# Patient Record
Sex: FEMALE | Race: WHITE | NOT HISPANIC OR LATINO | Employment: OTHER | URBAN - METROPOLITAN AREA
[De-identification: names, ages, dates, MRNs, and addresses within clinical notes are randomized per-mention and may not be internally consistent; named-entity substitution may affect disease eponyms.]

---

## 2017-04-26 ENCOUNTER — TELEPHONE (OUTPATIENT)
Dept: NEUROSURGERY | Facility: CLINIC | Age: 77
End: 2017-04-26

## 2017-04-26 NOTE — TELEPHONE ENCOUNTER
"History of  shunt. Last appt in 2013 with shunt-o-gram 3-4-13/no problems found. Patient rambling on (difficult to get her story out, long-winded) - states that her [facility] doc told her to get a disc and send it to us as her \"head was filling up\".  I explained to patient that we need to have these records faxed or mailed to us along with a disc that has any new imaging so we can help figure out what is going on and whether she needs to be seen here. She doesn't report \"any problems\".     After repeating myself multiple times, she will do this.  "

## 2017-11-09 ENCOUNTER — CONVERSION ENCOUNTER (OUTPATIENT)
Dept: GENERAL RADIOLOGY | Facility: HOSPITAL | Age: 77
End: 2017-11-09

## 2018-01-18 ENCOUNTER — OFFICE VISIT CONVERTED (OUTPATIENT)
Dept: CARDIOLOGY | Facility: CLINIC | Age: 78
End: 2018-01-18
Attending: INTERNAL MEDICINE

## 2018-01-29 ENCOUNTER — OFFICE VISIT CONVERTED (OUTPATIENT)
Dept: FAMILY MEDICINE CLINIC | Facility: CLINIC | Age: 78
End: 2018-01-29
Attending: PHYSICIAN ASSISTANT

## 2018-01-30 ENCOUNTER — OFFICE VISIT CONVERTED (OUTPATIENT)
Dept: NEUROLOGY | Facility: CLINIC | Age: 78
End: 2018-01-30
Attending: PSYCHIATRY & NEUROLOGY

## 2018-04-11 ENCOUNTER — CONVERSION ENCOUNTER (OUTPATIENT)
Dept: ONCOLOGY | Facility: HOSPITAL | Age: 78
End: 2018-04-11

## 2018-04-19 ENCOUNTER — CONVERSION ENCOUNTER (OUTPATIENT)
Dept: CARDIOLOGY | Facility: CLINIC | Age: 78
End: 2018-04-19

## 2018-04-19 ENCOUNTER — CONVERSION ENCOUNTER (OUTPATIENT)
Dept: CARDIOLOGY | Facility: CLINIC | Age: 78
End: 2018-04-19
Attending: INTERNAL MEDICINE

## 2018-06-06 ENCOUNTER — OFFICE VISIT CONVERTED (OUTPATIENT)
Dept: FAMILY MEDICINE CLINIC | Facility: CLINIC | Age: 78
End: 2018-06-06
Attending: PHYSICIAN ASSISTANT

## 2018-06-15 ENCOUNTER — OFFICE VISIT CONVERTED (OUTPATIENT)
Dept: NEUROLOGY | Facility: CLINIC | Age: 78
End: 2018-06-15
Attending: PSYCHIATRY & NEUROLOGY

## 2018-06-18 ENCOUNTER — CONVERSION ENCOUNTER (OUTPATIENT)
Dept: SURGERY | Facility: CLINIC | Age: 78
End: 2018-06-18

## 2018-06-18 ENCOUNTER — OFFICE VISIT CONVERTED (OUTPATIENT)
Dept: SURGERY | Facility: CLINIC | Age: 78
End: 2018-06-18
Attending: PHYSICIAN ASSISTANT

## 2018-07-06 ENCOUNTER — OFFICE VISIT CONVERTED (OUTPATIENT)
Dept: SURGERY | Facility: CLINIC | Age: 78
End: 2018-07-06
Attending: PHYSICIAN ASSISTANT

## 2018-08-06 ENCOUNTER — OFFICE VISIT CONVERTED (OUTPATIENT)
Dept: SURGERY | Facility: CLINIC | Age: 78
End: 2018-08-06
Attending: PHYSICIAN ASSISTANT

## 2018-10-15 ENCOUNTER — OFFICE VISIT CONVERTED (OUTPATIENT)
Dept: FAMILY MEDICINE CLINIC | Facility: CLINIC | Age: 78
End: 2018-10-15
Attending: PHYSICIAN ASSISTANT

## 2018-11-14 ENCOUNTER — OFFICE VISIT CONVERTED (OUTPATIENT)
Dept: ONCOLOGY | Facility: HOSPITAL | Age: 78
End: 2018-11-14
Attending: NURSE PRACTITIONER

## 2018-11-24 ENCOUNTER — CONVERSION ENCOUNTER (OUTPATIENT)
Dept: GENERAL RADIOLOGY | Facility: HOSPITAL | Age: 78
End: 2018-11-24

## 2019-01-10 ENCOUNTER — OFFICE VISIT CONVERTED (OUTPATIENT)
Dept: NEUROLOGY | Facility: CLINIC | Age: 79
End: 2019-01-10
Attending: PSYCHIATRY & NEUROLOGY

## 2019-01-24 ENCOUNTER — HOSPITAL ENCOUNTER (OUTPATIENT)
Dept: LAB | Facility: HOSPITAL | Age: 79
Discharge: HOME | End: 2019-01-24

## 2019-01-24 LAB
ALBUMIN SERPL-MCNC: 3.8 G/DL (ref 3.5–5)
ALBUMIN/GLOB SERPL: 0.9 {RATIO} (ref 1.4–2.6)
ALP SERPL-CCNC: 79 U/L (ref 43–160)
ALT SERPL-CCNC: 15 U/L (ref 10–40)
ANION GAP SERPL CALC-SCNC: 15 MMOL/L (ref 8–19)
AST SERPL-CCNC: 14 U/L (ref 15–50)
BILIRUB SERPL-MCNC: 0.33 MG/DL (ref 0.2–1.3)
BUN SERPL-MCNC: 10 MG/DL (ref 5–25)
BUN/CREAT SERPL: 9 {RATIO} (ref 6–20)
CALCIUM SERPL-MCNC: 9.5 MG/DL (ref 8.7–10.4)
CHLORIDE SERPL-SCNC: 96 MMOL/L (ref 99–111)
CONV CO2: 30 MMOL/L (ref 22–32)
CONV TOTAL PROTEIN: 8.2 G/DL (ref 6.3–8.2)
CREAT UR-MCNC: 1.12 MG/DL (ref 0.5–0.9)
GFR SERPLBLD BASED ON 1.73 SQ M-ARVRAT: 48 ML/MIN/{1.73_M2}
GLOBULIN UR ELPH-MCNC: 4.4 G/DL (ref 2–3.5)
GLUCOSE SERPL-MCNC: 107 MG/DL (ref 65–99)
OSMOLALITY SERPL CALC.SUM OF ELEC: 284 MOSM/KG (ref 273–304)
POTASSIUM SERPL-SCNC: 3.7 MMOL/L (ref 3.5–5.3)
SODIUM SERPL-SCNC: 137 MMOL/L (ref 135–147)

## 2019-01-25 ENCOUNTER — OFFICE VISIT CONVERTED (OUTPATIENT)
Dept: ONCOLOGY | Facility: HOSPITAL | Age: 79
End: 2019-01-25
Attending: NURSE PRACTITIONER

## 2019-01-25 ENCOUNTER — HOSPITAL ENCOUNTER (OUTPATIENT)
Dept: ONCOLOGY | Facility: HOSPITAL | Age: 79
Discharge: HOME | End: 2019-01-25
Attending: NURSE PRACTITIONER

## 2019-01-25 LAB
ALBUMIN SERPL-MCNC: 3.8 G/DL (ref 3.5–5)
ALBUMIN/GLOB SERPL: 0.9 {RATIO} (ref 1.4–2.6)
ALP SERPL-CCNC: 79 U/L (ref 43–160)
ALT SERPL-CCNC: 14 U/L (ref 10–40)
ANION GAP SERPL CALC-SCNC: 14 MMOL/L (ref 8–19)
AST SERPL-CCNC: 13 U/L (ref 15–50)
BASOPHILS # BLD AUTO: 0.06 10*3/UL (ref 0–0.2)
BASOPHILS # BLD AUTO: 0.06 10*3/UL (ref 0–0.2)
BASOPHILS # BLD: 2 % (ref 0–3)
BASOPHILS NFR BLD AUTO: 0.5 % (ref 0–3)
BASOPHILS NFR BLD AUTO: 0.54 % (ref 0–3)
BILIRUB SERPL-MCNC: 0.3 MG/DL (ref 0.2–1.3)
BUN SERPL-MCNC: 11 MG/DL (ref 5–25)
BUN/CREAT SERPL: 11 {RATIO} (ref 6–20)
CALCIUM SERPL-MCNC: 9.3 MG/DL (ref 8.7–10.4)
CHLORIDE SERPL-SCNC: 97 MMOL/L (ref 99–111)
CLUMPED PLATELETS: ABNORMAL
CONV ABS BANDS: 1 % (ref 1–5)
CONV ANISOCYTES: SLIGHT
CONV CO2: 28 MMOL/L (ref 22–32)
CONV SEGMENTED NEUTROPHILS: 69 % (ref 45–70)
CONV TOTAL PROTEIN: 8.1 G/DL (ref 6.3–8.2)
CREAT UR-MCNC: 0.96 MG/DL (ref 0.5–0.9)
EOSINOPHIL # BLD AUTO: 0.28 10*3/UL (ref 0–0.7)
EOSINOPHIL # BLD AUTO: 0.32 10*3/UL (ref 0–0.7)
EOSINOPHIL # BLD AUTO: 2.5 % (ref 0–7)
EOSINOPHIL # BLD AUTO: 2.66 % (ref 0–7)
EOSINOPHIL NFR BLD AUTO: 2 % (ref 0–7)
ERYTHROCYTE [DISTWIDTH] IN BLOOD BY AUTOMATED COUNT: 13.6 % (ref 11.5–14.5)
ERYTHROCYTE [DISTWIDTH] IN BLOOD BY AUTOMATED COUNT: 14.5 % (ref 11.5–14.5)
GFR SERPLBLD BASED ON 1.73 SQ M-ARVRAT: 57 ML/MIN/{1.73_M2}
GLOBULIN UR ELPH-MCNC: 4.3 G/DL (ref 2–3.5)
GLUCOSE SERPL-MCNC: 108 MG/DL (ref 65–99)
HBA1C MFR BLD: 12.3 G/DL (ref 12–16)
HBA1C MFR BLD: 13.2 G/DL (ref 12–16)
HCT VFR BLD AUTO: 37 % (ref 37–47)
HCT VFR BLD AUTO: 38.4 % (ref 37–47)
LARGE PLATELETS: SLIGHT
LYMPHOCYTES # BLD AUTO: 2.4 10*3/UL (ref 1–5)
LYMPHOCYTES # BLD AUTO: 2.94 10*3/UL (ref 1–5)
MACROCYTES BLD QL SMEAR: SLIGHT
MCH RBC QN AUTO: 32.7 PG (ref 27–31)
MCH RBC QN AUTO: 33.7 PG (ref 27–31)
MCHC RBC AUTO-ENTMCNC: 33.2 G/DL (ref 33–37)
MCHC RBC AUTO-ENTMCNC: 34.4 G/DL (ref 33–37)
MCV RBC AUTO: 98.1 FL (ref 81–99)
MCV RBC AUTO: 98.4 FL (ref 81–99)
MONOCYTES # BLD AUTO: 0.82 10*3/UL (ref 0.2–1.2)
MONOCYTES # BLD AUTO: 0.83 10*3/UL (ref 0.2–1.2)
MONOCYTES NFR BLD AUTO: 6.99 % (ref 3–10)
MONOCYTES NFR BLD AUTO: 7.3 % (ref 3–10)
MONOCYTES NFR BLD MANUAL: 5 % (ref 3–10)
NEUTROPHILS # BLD AUTO: 7.65 10*3/UL (ref 2–8)
NEUTROPHILS # BLD AUTO: 7.77 10*3/UL (ref 2–8)
NEUTROPHILS NFR BLD AUTO: 65.2 % (ref 30–85)
NEUTROPHILS NFR BLD AUTO: 67.8 % (ref 30–85)
NRBC BLD AUTO-RTO: 0 % (ref 0–0.01)
NUC CELL # PRT MANUAL: 0 /100{WBCS}
OSMOLALITY SERPL CALC.SUM OF ELEC: 280 MOSM/KG (ref 273–304)
PLAT MORPH BLD: NORMAL
PLATELET # BLD AUTO: 214 10*3/UL (ref 130–400)
PLATELET # BLD AUTO: 218 10*3/UL (ref 130–400)
PMV BLD AUTO: 12.5 FL (ref 7.4–10.4)
PMV BLD AUTO: 9.6 FL (ref 7.4–10.4)
POIKILOCYTOSIS BLD QL SMEAR: SLIGHT
POLYCHROMASIA BLD QL SMEAR: SLIGHT
POTASSIUM SERPL-SCNC: 3.9 MMOL/L (ref 3.5–5.3)
RBC # BLD AUTO: 3.76 10*6/UL (ref 4.2–5.4)
RBC # BLD AUTO: 3.92 10*6/UL (ref 4.2–5.4)
SMALL PLATELETS BLD QL SMEAR: ADEQUATE
SODIUM SERPL-SCNC: 135 MMOL/L (ref 135–147)
VARIANT LYMPHS NFR BLD MANUAL: 21 % (ref 20–45)
VARIANT LYMPHS NFR BLD MANUAL: 21.3 % (ref 20–45)
VARIANT LYMPHS NFR BLD MANUAL: 24.7 % (ref 20–45)
WBC # BLD AUTO: 11.28 10*3/UL (ref 4.8–10.8)
WBC # BLD AUTO: 11.9 10*3/UL (ref 4.8–10.8)

## 2019-01-26 LAB
FREE LIGHT CHAINS: 77.7 MG/L (ref 3.3–19.4)
KAPPA LC/LAMBDA SER: 2.03 {RATIO} (ref 0.26–1.65)
LAMBDA LC FREE SERPL-MCNC: 38.2 MG/L (ref 5.7–26.3)

## 2019-01-27 LAB — B2 MICROGLOB SERPL-MCNC: 2.6 MG/L (ref 0.6–2.4)

## 2019-01-28 LAB
ALBUMIN SERPL-MCNC: 2.9 G/DL (ref 2.9–4.4)
ALBUMIN/GLOB SERPL: 0.6 {RATIO} (ref 0.7–1.7)
ALPHA2 GLOB SERPL ELPH-MCNC: 0.9 G/DL (ref 0.4–1)
BETA GLOBULIN: 1.2 G/DL (ref 0.7–1.3)
CONV ALPHA-1-GLOBULIN: 0.2 G/DL (ref 0–0.4)
CONV IMMUNOGLOBULIN G (IGG): 2002 MG/DL (ref 700–1600)
CONV IMMUNOGLOBULIN M (IGM): 127 MG/DL (ref 26–217)
CONV PE INTERPRETATION: ABNORMAL
CONV PE NOTE: ABNORMAL
CONV TOTAL PROTEIN: 7.5 G/DL (ref 6–8.5)
GAMMA GLOB SERPL ELPH-MCNC: 2.3 G/DL (ref 0.4–1.8)
GLOBULIN UR ELPH-MCNC: 4.6 G/DL (ref 2.2–3.9)
IGA SERPL-MCNC: 510 MG/DL (ref 64–422)
M-SPIKE: 0.6 G/DL
PROT PATTERN SERPL IFE-IMP: ABNORMAL

## 2019-01-29 ENCOUNTER — HOSPITAL ENCOUNTER (OUTPATIENT)
Dept: CT IMAGING | Facility: HOSPITAL | Age: 79
Discharge: HOME | End: 2019-01-29
Attending: NURSE PRACTITIONER

## 2019-01-29 LAB
BASOPHILS # BLD AUTO: 0.02 10*3/UL (ref 0–0.2)
BASOPHILS # BLD: 0 % (ref 0–3)
BASOPHILS NFR BLD AUTO: 0.22 % (ref 0–3)
CLUMPED PLATELETS: SLIGHT
CONV ABS BANDS: 0 % (ref 1–5)
CONV ANISOCYTES: SLIGHT
CONV SEGMENTED NEUTROPHILS: 63 % (ref 45–70)
EOSINOPHIL # BLD AUTO: 0.21 10*3/UL (ref 0–0.7)
EOSINOPHIL # BLD AUTO: 1.97 % (ref 0–7)
EOSINOPHIL NFR BLD AUTO: 0 % (ref 0–7)
ERYTHROCYTE [DISTWIDTH] IN BLOOD BY AUTOMATED COUNT: 13.4 % (ref 11.5–14.5)
HBA1C MFR BLD: 13.2 G/DL (ref 12–16)
HCT VFR BLD AUTO: 36.9 % (ref 37–47)
LARGE PLATELETS: SLIGHT
LYMPHOCYTES # BLD AUTO: 2.47 10*3/UL (ref 1–5)
MACROCYTES BLD QL SMEAR: ABNORMAL
MCH RBC QN AUTO: 34.3 PG (ref 27–31)
MCHC RBC AUTO-ENTMCNC: 35.8 G/DL (ref 33–37)
MCV RBC AUTO: 95.8 FL (ref 81–99)
MONOCYTES # BLD AUTO: 0.55 10*3/UL (ref 0.2–1.2)
MONOCYTES NFR BLD AUTO: 5.23 % (ref 3–10)
MONOCYTES NFR BLD MANUAL: 1 % (ref 3–10)
NEUTROPHILS # BLD AUTO: 7.35 10*3/UL (ref 2–8)
NEUTROPHILS NFR BLD AUTO: 69.3 % (ref 30–85)
NRBC BLD AUTO-RTO: 0 % (ref 0–0.01)
NUC CELL # PRT MANUAL: 0 /100{WBCS}
PLAT MORPH BLD: NORMAL
PLATELET # BLD AUTO: 230 10*3/UL (ref 130–400)
PMV BLD AUTO: 8.2 FL (ref 7.4–10.4)
RBC # BLD AUTO: 3.85 10*6/UL (ref 4.2–5.4)
SMALL PLATELETS BLD QL SMEAR: ADEQUATE
VARIANT LYMPHS NFR BLD MANUAL: 23.3 % (ref 20–45)
VARIANT LYMPHS NFR BLD MANUAL: 36 % (ref 20–45)
WBC # BLD AUTO: 10.6 10*3/UL (ref 4.8–10.8)

## 2019-01-31 ENCOUNTER — HOSPITAL ENCOUNTER (OUTPATIENT)
Dept: PET IMAGING | Facility: HOSPITAL | Age: 79
Discharge: HOME | End: 2019-01-31
Attending: NURSE PRACTITIONER

## 2019-01-31 LAB — CONV LEUKEMIA/LYMPHOMA PHENOTYPING PROF (BONE MARROW): NORMAL

## 2019-02-04 LAB
ALBUMIN 24H MFR UR ELPH: 21.9 %
ALPHA1 GLOB 24H MFR UR ELPH: 11.5 %
ALPHA2 GLOB 24H MFR UR ELPH: 14.4 %
B-GLOBULIN MFR UR ELPH: 33 %
CONV IMMUNOFIXATION (URINE): NORMAL
CONV MULTIPLE MYELOMA CHROMOSOME ANALYSIS FISH: NORMAL
CONV PE NOTE: NORMAL
GAMMA GLOB 24H MFR UR ELPH: 19.2 %
M PROTEIN MFR UR ELPH: NORMAL %
PROT UR-MCNC: 7.9 MG/DL

## 2019-02-07 LAB — CONV CHROMOSOME ANALYSIS BONE MARROW/REFLX GENOMIC MICROARRAY: NORMAL

## 2019-02-11 LAB — CONV CYTOGENOMIC SNP MICROARRAY: NORMAL

## 2019-03-08 ENCOUNTER — OFFICE VISIT CONVERTED (OUTPATIENT)
Dept: ONCOLOGY | Facility: HOSPITAL | Age: 79
End: 2019-03-08

## 2019-03-08 ENCOUNTER — HOSPITAL ENCOUNTER (OUTPATIENT)
Dept: ONCOLOGY | Facility: HOSPITAL | Age: 79
Discharge: HOME | End: 2019-03-08

## 2019-04-10 ENCOUNTER — CONVERSION ENCOUNTER (OUTPATIENT)
Dept: CARDIOLOGY | Facility: CLINIC | Age: 79
End: 2019-04-10

## 2019-04-10 ENCOUNTER — OFFICE VISIT CONVERTED (OUTPATIENT)
Dept: CARDIOLOGY | Facility: CLINIC | Age: 79
End: 2019-04-10
Attending: INTERNAL MEDICINE

## 2019-04-15 ENCOUNTER — HOSPITAL ENCOUNTER (OUTPATIENT)
Dept: LAB | Facility: HOSPITAL | Age: 79
Discharge: HOME | End: 2019-04-15
Attending: PHYSICIAN ASSISTANT

## 2019-04-15 ENCOUNTER — OFFICE VISIT CONVERTED (OUTPATIENT)
Dept: FAMILY MEDICINE CLINIC | Facility: CLINIC | Age: 79
End: 2019-04-15
Attending: PHYSICIAN ASSISTANT

## 2019-04-15 LAB
25(OH)D3 SERPL-MCNC: 50.7 NG/ML (ref 30–100)
ALBUMIN SERPL-MCNC: 3.5 G/DL (ref 3.5–5)
ALBUMIN/GLOB SERPL: 0.7 {RATIO} (ref 1.4–2.6)
ALP SERPL-CCNC: 91 U/L (ref 43–160)
ALT SERPL-CCNC: 12 U/L (ref 10–40)
ANION GAP SERPL CALC-SCNC: 16 MMOL/L (ref 8–19)
APPEARANCE UR: CLEAR
AST SERPL-CCNC: 11 U/L (ref 15–50)
BASOPHILS # BLD AUTO: 0.05 10*3/UL (ref 0–0.2)
BASOPHILS NFR BLD AUTO: 0.4 % (ref 0–3)
BILIRUB SERPL-MCNC: 0.38 MG/DL (ref 0.2–1.3)
BILIRUB UR QL: NEGATIVE
BUN SERPL-MCNC: 10 MG/DL (ref 5–25)
BUN/CREAT SERPL: 10 {RATIO} (ref 6–20)
CALCIUM SERPL-MCNC: 9.3 MG/DL (ref 8.7–10.4)
CHLORIDE SERPL-SCNC: 96 MMOL/L (ref 99–111)
CHOLEST SERPL-MCNC: 154 MG/DL (ref 107–200)
CHOLEST/HDLC SERPL: 3.9 {RATIO} (ref 3–6)
COLOR UR: YELLOW
CONV ABS IMM GRAN: 0.08 10*3/UL (ref 0–0.2)
CONV CO2: 29 MMOL/L (ref 22–32)
CONV COLLECTION SOURCE (UA): NORMAL
CONV IMMATURE GRAN: 0.7 % (ref 0–1.8)
CONV TOTAL PROTEIN: 8.4 G/DL (ref 6.3–8.2)
CONV UROBILINOGEN IN URINE BY AUTOMATED TEST STRIP: 0.2 {EHRLICHU}/DL (ref 0.1–1)
CREAT UR-MCNC: 1.01 MG/DL (ref 0.5–0.9)
DEPRECATED RDW RBC AUTO: 50 FL (ref 36.4–46.3)
EOSINOPHIL # BLD AUTO: 0.29 10*3/UL (ref 0–0.7)
EOSINOPHIL # BLD AUTO: 2.6 % (ref 0–7)
ERYTHROCYTE [DISTWIDTH] IN BLOOD BY AUTOMATED COUNT: 13.3 % (ref 11.7–14.4)
EST. AVERAGE GLUCOSE BLD GHB EST-MCNC: 123 MG/DL
FERRITIN SERPL-MCNC: 720 NG/ML (ref 10–200)
GFR SERPLBLD BASED ON 1.73 SQ M-ARVRAT: 54 ML/MIN/{1.73_M2}
GLOBULIN UR ELPH-MCNC: 4.9 G/DL (ref 2–3.5)
GLUCOSE SERPL-MCNC: 113 MG/DL (ref 65–99)
GLUCOSE UR QL: NEGATIVE MG/DL
HBA1C MFR BLD: 12.6 G/DL (ref 12–16)
HBA1C MFR BLD: 5.9 % (ref 3.5–5.7)
HCT VFR BLD AUTO: 37.9 % (ref 37–47)
HDLC SERPL-MCNC: 39 MG/DL (ref 40–60)
HGB UR QL STRIP: NEGATIVE
IRON SATN MFR SERPL: 21 % (ref 20–55)
IRON SERPL-MCNC: 58 UG/DL (ref 60–170)
KETONES UR QL STRIP: NEGATIVE MG/DL
LDLC SERPL CALC-MCNC: 72 MG/DL (ref 70–100)
LEUKOCYTE ESTERASE UR QL STRIP: NEGATIVE
LYMPHOCYTES # BLD AUTO: 2.56 10*3/UL (ref 1–5)
MCH RBC QN AUTO: 33.6 PG (ref 27–31)
MCHC RBC AUTO-ENTMCNC: 33.2 G/DL (ref 33–37)
MCV RBC AUTO: 101.1 FL (ref 81–99)
MONOCYTES # BLD AUTO: 0.76 10*3/UL (ref 0.2–1.2)
MONOCYTES NFR BLD AUTO: 6.7 % (ref 3–10)
NEUTROPHILS # BLD AUTO: 7.58 10*3/UL (ref 2–8)
NEUTROPHILS NFR BLD AUTO: 67 % (ref 30–85)
NITRITE UR QL STRIP: NEGATIVE
NRBC CBCN: 0 % (ref 0–0.7)
OSMOLALITY SERPL CALC.SUM OF ELEC: 284 MOSM/KG (ref 273–304)
PH UR STRIP.AUTO: 7 [PH] (ref 5–8)
PLATELET # BLD AUTO: 180 10*3/UL (ref 130–400)
PMV BLD AUTO: 12.9 FL (ref 9.4–12.3)
POTASSIUM SERPL-SCNC: 3.7 MMOL/L (ref 3.5–5.3)
PROT UR QL: NEGATIVE MG/DL
RBC # BLD AUTO: 3.75 10*6/UL (ref 4.2–5.4)
SODIUM SERPL-SCNC: 137 MMOL/L (ref 135–147)
SP GR UR: 1.02 (ref 1–1.03)
T4 FREE SERPL-MCNC: 1.1 NG/DL (ref 0.9–1.8)
TIBC SERPL-MCNC: 277 UG/DL (ref 245–450)
TRANSFERRIN SERPL-MCNC: 194 MG/DL (ref 250–380)
TRIGL SERPL-MCNC: 213 MG/DL (ref 40–150)
TSH SERPL-ACNC: 16.52 M[IU]/L (ref 0.27–4.2)
VARIANT LYMPHS NFR BLD MANUAL: 22.6 % (ref 20–45)
VLDLC SERPL-MCNC: 43 MG/DL (ref 5–37)
WBC # BLD AUTO: 11.32 10*3/UL (ref 4.8–10.8)

## 2019-04-26 ENCOUNTER — OFFICE VISIT CONVERTED (OUTPATIENT)
Dept: FAMILY MEDICINE CLINIC | Facility: CLINIC | Age: 79
End: 2019-04-26
Attending: PHYSICIAN ASSISTANT

## 2021-04-13 ENCOUNTER — HOSPITAL ENCOUNTER (OUTPATIENT)
Dept: GENERAL RADIOLOGY | Facility: HOSPITAL | Age: 81
Discharge: HOME | End: 2021-04-13
Attending: INTERNAL MEDICINE

## 2021-05-15 VITALS
DIASTOLIC BLOOD PRESSURE: 71 MMHG | OXYGEN SATURATION: 94 % | HEART RATE: 77 BPM | SYSTOLIC BLOOD PRESSURE: 139 MMHG | WEIGHT: 222.37 LBS

## 2021-05-15 VITALS
HEIGHT: 64 IN | WEIGHT: 225.12 LBS | OXYGEN SATURATION: 97 % | BODY MASS INDEX: 38.43 KG/M2 | SYSTOLIC BLOOD PRESSURE: 114 MMHG | HEART RATE: 64 BPM | DIASTOLIC BLOOD PRESSURE: 62 MMHG

## 2021-05-15 VITALS
HEIGHT: 64 IN | DIASTOLIC BLOOD PRESSURE: 80 MMHG | SYSTOLIC BLOOD PRESSURE: 118 MMHG | BODY MASS INDEX: 37.9 KG/M2 | WEIGHT: 222 LBS | HEART RATE: 65 BPM

## 2021-05-16 VITALS
DIASTOLIC BLOOD PRESSURE: 68 MMHG | HEART RATE: 83 BPM | HEIGHT: 64 IN | SYSTOLIC BLOOD PRESSURE: 174 MMHG | WEIGHT: 208.25 LBS | BODY MASS INDEX: 35.55 KG/M2

## 2021-05-16 VITALS
HEIGHT: 64 IN | SYSTOLIC BLOOD PRESSURE: 138 MMHG | HEART RATE: 76 BPM | DIASTOLIC BLOOD PRESSURE: 56 MMHG | BODY MASS INDEX: 36.79 KG/M2 | WEIGHT: 215.5 LBS

## 2021-05-16 VITALS
HEIGHT: 64 IN | HEART RATE: 83 BPM | SYSTOLIC BLOOD PRESSURE: 134 MMHG | WEIGHT: 216 LBS | DIASTOLIC BLOOD PRESSURE: 78 MMHG | BODY MASS INDEX: 36.88 KG/M2

## 2021-05-16 VITALS — WEIGHT: 209.12 LBS | HEIGHT: 64 IN | BODY MASS INDEX: 35.7 KG/M2 | RESPIRATION RATE: 12 BRPM

## 2021-05-16 VITALS
WEIGHT: 219 LBS | BODY MASS INDEX: 40.3 KG/M2 | HEART RATE: 72 BPM | HEIGHT: 62 IN | DIASTOLIC BLOOD PRESSURE: 53 MMHG | SYSTOLIC BLOOD PRESSURE: 146 MMHG

## 2021-05-16 VITALS — HEART RATE: 97 BPM | RESPIRATION RATE: 14 BRPM | OXYGEN SATURATION: 95 %

## 2021-05-16 VITALS — HEIGHT: 64 IN | BODY MASS INDEX: 35.68 KG/M2 | RESPIRATION RATE: 12 BRPM | WEIGHT: 209 LBS

## 2021-05-16 VITALS
HEART RATE: 96 BPM | BODY MASS INDEX: 37.22 KG/M2 | DIASTOLIC BLOOD PRESSURE: 75 MMHG | WEIGHT: 218 LBS | SYSTOLIC BLOOD PRESSURE: 151 MMHG | HEIGHT: 64 IN

## 2021-05-16 VITALS
WEIGHT: 218 LBS | SYSTOLIC BLOOD PRESSURE: 138 MMHG | HEART RATE: 64 BPM | BODY MASS INDEX: 37.22 KG/M2 | HEIGHT: 64 IN | DIASTOLIC BLOOD PRESSURE: 72 MMHG

## 2021-05-16 VITALS
SYSTOLIC BLOOD PRESSURE: 160 MMHG | HEIGHT: 64 IN | HEART RATE: 68 BPM | DIASTOLIC BLOOD PRESSURE: 74 MMHG | BODY MASS INDEX: 35.85 KG/M2 | WEIGHT: 210 LBS

## 2021-05-16 VITALS
WEIGHT: 207 LBS | HEIGHT: 64 IN | DIASTOLIC BLOOD PRESSURE: 44 MMHG | SYSTOLIC BLOOD PRESSURE: 128 MMHG | BODY MASS INDEX: 35.34 KG/M2 | HEART RATE: 76 BPM

## 2021-05-28 VITALS
WEIGHT: 220.46 LBS | RESPIRATION RATE: 20 BRPM | HEART RATE: 65 BPM | DIASTOLIC BLOOD PRESSURE: 71 MMHG | TEMPERATURE: 96.8 F | DIASTOLIC BLOOD PRESSURE: 62 MMHG | OXYGEN SATURATION: 97 % | TEMPERATURE: 96.7 F | SYSTOLIC BLOOD PRESSURE: 144 MMHG | BODY MASS INDEX: 38.73 KG/M2 | BODY MASS INDEX: 37.64 KG/M2 | HEIGHT: 64 IN | OXYGEN SATURATION: 97 % | HEART RATE: 82 BPM | HEIGHT: 64 IN | WEIGHT: 226.85 LBS | SYSTOLIC BLOOD PRESSURE: 139 MMHG

## 2021-05-28 VITALS
RESPIRATION RATE: 18 BRPM | HEIGHT: 64 IN | OXYGEN SATURATION: 96 % | WEIGHT: 223.99 LBS | SYSTOLIC BLOOD PRESSURE: 129 MMHG | TEMPERATURE: 97 F | HEART RATE: 73 BPM | BODY MASS INDEX: 38.24 KG/M2 | DIASTOLIC BLOOD PRESSURE: 46 MMHG

## 2021-05-28 VITALS
SYSTOLIC BLOOD PRESSURE: 133 MMHG | DIASTOLIC BLOOD PRESSURE: 55 MMHG | HEART RATE: 75 BPM | TEMPERATURE: 97.8 F | RESPIRATION RATE: 20 BRPM

## 2021-05-28 NOTE — PROGRESS NOTES
Patient: GEORGETTE DAILY     Acct: [id number]     Report: #[id number]  UNIT #: [id number]     : 1940    Encounter Date:2019  PRIMARY CARE: SHORTY KEMP  ***Signed***  --------------------------------------------------------------------------------------------------------------------  NURSE INTAKE      Visit Type      Established Patient Visit            Chief Complaint      plasma cell dyscrasia            Referring Provider/Copies To      Referring Provider:  SHORTY KEMP            History and Present Illness      Past History      Ms. Georgette Daily is a 76 year old Caucausian female who presents today with her    daughter for evaluation for abnormal immunoglobulins. The patient is hard of     hearing and forgot her hearing aids today. Her daughter presents her history     today. Patient has a long history of tobacco abuse dating back to 50 years of 1     PPD. The daughter reports patient has had recent history of multiple falls and 2    days ago, fell asleep at her computer, falling out of the chair and woke up on     the floor. Patient recently completed a CT scan of the head at the [facility]     which the results of this are not available to me at this time. There is a     reported history of multiple blackouts and daughter states patient has an     implantable Holter monitor in to record the heart data. She sees Dr. Nieves as her    cardiologist.  Her PCP is Dr.Randall Kemp. Patient has been seeing Dr. Avila     for her breathing problems and was recently found to have a single pulmonary     nodule on CT.  Recently on antibotics for a lung infection.  She follows up with    Dr. Avila in May. Medical history includes: hard of hearing, black outs,     frequent falls,  hypercholesteremia, hypertension, hypothyroidism,  Meniere's     disease.  Surgeries include:  brain anurysm surgery x 5 dating back to ,     brain shunts x 2 surgeries, gallbladder removal, facial melanoma surgery, and      hystorectomy. Medications include: Acebutolol, Vitamin D 3, Flaxseed Oil, HCTZ,     Potassium tablets, Levothyroxine, and a MVI. The patient has been using a cane     for assistance for the last 12 years. ROS: reveals shorrtness of air, wheezing,     productive sounding cough, hard of hearing, obesity, and bilateral lower     extremity swelling. The daughter reports patient was given Lasix for 3 days by     Dr. Nieves for the lower extremity swelling which has not helped with the     swelling. Family history includes a mother with bone cancer, a brother with     diagnosis of melanoma, and her father who  of a hemmorhagic stroke.  Patient    has multiple reports allergies to multiple medications. See List.            -2016.  IgG 2159, IgA 509, IgM 125, WBC 11.40, Hgb 12.0, MCV 92, Plt     234.       -2016.  WBC 12.40, Hemoglobin 11.60, MCV 92.00, Platelet 299      -2016.   [id number], IgA 492, IgM 121, no M spike observed, WBC 10.40,     Hgb 11.60, Plt 267      -March 3, 2017.  IgG 1675, IgA 437, IgM 127. serum immunofixation, no M spike     observed. IgG monoclonal protein, asymmetrical gamma.       -2017.  Periphal Blood smear noted abnormal immunoglobulins.       -2017. Bilateral Mammogram - Benign mammogram.       -December 3, 2017. Patient had recent fall - Head CT performed - Stable exam. No    acute intracranial abnormalities are identified. Stable bilateral masses in the     visualized parotid glands measuring up to 1 cm possibly representing     intraparotid nodes or benign parotid neoplasms. Soft tissue laceration in the     posterior left vertex. No skull fractures are identified.      - .  CT chest, abdomen, pelvis showed no acute abnormality of the     chest, abdomen, or pelvis.  Stable appearance of ground glass opacity.  9 mm in     size.      -2018. WBC 9.72, Hemoglobin 12.80, Platelet count 230,000. BUN 12,      Creatinine 0.82. Ferritin 353, Iron 47, TIBC 295, Iron sat 16%. TSH 3.080.             HPI - Hematology Interim      Presents by herself  for follow up for plasma cell dyscrasia and iron deficiency    anemia.             She uses [employer] to get to clinic visits. Ambulates with a rolling walker.    She continues to smoke daily. Today, we discussed her previous blood work which     shows an elevated monoclonal protein on previous SPEP in November.     Immunoglobulins shows a monoclonal elevated IGG protein.             We discussed the need for a bone marrow biopsy to further evaluate the elevated     monoclonality of the IgG levels. We discussed  the procedure and where to be     completed. She is agreeable to procedure.             She denies any recent infections, fevers, drenching night sweats or weight loss.    She denies any recent fractures, falls or worsening bone pain.             She reports she received blood work yesterday at [facility] but can not visu    xiang this in [facility] [employer] Will obtain results from [facility].             CBC done: WBC 9.72, Hemoglobin 12.80, platelet count 230,000. No anemia present.    On 1/124/2019: BUN 10, creatinine 1.12. Corrected calcium 9.5.            Most Recent Lab Findings      Laboratory Tests      1/24/19 14:55            PAST, FAMILY   Past Medical History      Past Medical History:  Arthritis, COPD, Emphysema, Hypertension, Short of Air,     Thyroid Disease      Hematology/Oncology (F):  Anemia            Past Surgical History      Appendectomy, Cholecystectomy, Hysterectomy            Social History      Marital Status:  Single      Lives independently:  Yes      Occupation:  disabled            Tobacco Use      Tobacco status:  Current every day smoker (less than 1 ppd x 60 y)      Smoking packs/day:  1            Alcohol Use      Alcohol intake:  None            Substance Use      Substance use:  Denies use            REVIEW OF SYSTEMS      General:   Denies: Appetite Change, Fatigue, Fever, Night Sweats, Weight Gain,     Weight Loss      Eye:  Denies: Blurred Vision, Corrective Lenses, Diplopia, Eye Irritation, Eye     Pain, Eye Redness, Spots in Vision, Vision Loss      ENT:  Denies: Headache, Hearing Loss, Hoarseness, Seizures, Sinus Congestion,     Sore Throat      Cardiovascular:  Denies: Chest Pain, Edema Ankles, Edema Legs, Irregular     Heartbeat, Palpitations      Respiratory:  Denies: Coughing Blood, Productive Cough, Shortness of Air, Wheez    ing      Gastrointestinal:  Denies: Bloody Stools, Constipation, Diarrhea, Frequent     Heartburn, Nausea, Problem Swallowing, Tarry Stools, Unable to Control Bowels,     Vomiting      Genitourinary (female):  Denies: Blood in Urine, Decrease Urine Stream, Frequent    Urination, Incontinence, Painful Urination      Musculoskeletal:  Denies: Back Pain, Leg Cramps, Muscle Pain, Muscle Weakness,     Painful Joints, Swollen Joints      Integumentary:  Denies: Bleeds Easily, Bruises Easily, Hair Changes, Jaundice,     Lesions, Mole Changes, Nail Changes, Pigment Changes, Rash, Skin Discoloration      Neurologic:  Denies: Dizziness, Fainting, Numbness\Tingling, Paralysis, Seizures      Psychiatric:  Denies: Anxiety, Confused, Depression, Disoriented, Memory Loss      Endocrine:  Denies: Cold Intolerance, Diabetes, Excessive Sweating, Excessive     Thirst, Excessive Urination, Heat Intolerance, Flushing, Hyperthyroidism,     Hypothyroidism      Hematologic/Lymphatic:  Denies: Bruising, Bleeding, Enlarged Lymph Nodes,     Recurrent Infections, Transfusions      Reproductive:  Admits: Menopause;          Denies: Heavy Periods, Pregnant, Still Menstruating            VITAL SIGNS AND SCORES      Vitals      Height 5 ft 4.00 in / 162.56 cm      Weight 226 lbs 13.653 oz / 102.9 kg      BSA 2.06 m2      BMI 38.9 kg/m2      Temperature 96.7 F / 35.94 C - Temporal      Pulse 82      Respirations 20      Blood Pressure 144/62  Sitting, Right Arm      Pulse Oximetry 97%, RM AIR            Pain Score      Experiencing any pain?:  No      Pain Scale Used:  Numerical      Pain Intensity:  0            Fatigue Score      Experiencing any fatigue?:  No      Fatigue (0-10 scale):  0 (none)            EXAM      General Appearance:  Positive for: Alert, Oriented x3, Cooperative      Eye:  Positive for: Moist Conjunctiva, PERRLA, Reactive to Light      HEENT:  Positive for: Oropharynx clear;          Negative for: Erythema      Neck:  Positive for: Full ROM, Supple      Respiratory:  Positive for: CTAB, Normal Respiratory Effort      Abdomen/Gastro:  Positive for: Normal Active Bowel Sounds, Soft;          Negative for: Distention, Tenderness      Cardiovascular:  Positive for: RRR;          Negative for: Click, Distant Heart Sounds, Gallop, Murmur, Peripheral Edema      Skin:  Positive for: Normal Temperature, Normal Texture and Turgor, Normal Tone      Psychiatric:  Positive for: AAO X 3, Appropriate Affect, Intact Judgement      Neurologic:  Positive for: Cranial Ner II-XII Intact;          Negative for: Deep Tendon Reflexes      Genitourinary:  Negative for: Bladder Distention      Musculoskeletal:  Positive for: Full ROM Lower Extremety, Full ROM Upper     Extremety, Full Muscle Strength, Full Muscle Tone      Lower Extremities:  Positive for: Normal Gait and Station, Pedal Pulses Intact,     Pedal Pulses Symetrical;          Negative for: Clubbing      Lymphatic:  Negative for: Axillary, Cervical, Supraclavicular            PREVENTION      Hx Influenza Vaccination:  Yes      Date Influenza Vaccine Given:  Oct 1, 2018      Influenza Vaccine Declined:  No      2 or More Falls Past Year?:  No      Fall Past Year with Injury?:  No      Hx Pneumococcal Vaccination:  Yes      Encouraged to follow-up with:  PCP regarding preventative exams.      Chart initiated by      TINA ZAPIEN CMA            ALLERGY/MEDS      Allergies      Coded Allergies:              PENICILLINS (Verified  Allergy, Severe, HIVES AND LIPS GET HARD, TROUBLE     BREATHING, 11/30/18)           AMOXICILLIN (Verified  Allergy, Unknown, RASH, 11/30/18)           ATORVASTATIN (Verified  Allergy, Unknown, EDEMA, 11/30/18)           BACITRACIN (Verified  Allergy, Unknown, HIVES ALL OVER, 11/30/18)           BACITRACIN ZINC (Verified  Allergy, Unknown, HIVES ALL OVER, 11/30/18)           CEFAZOLIN (Verified  Allergy, Unknown, HIVES, 11/30/18)           CEPHALEXIN (Verified  Allergy, Unknown, HIVES, 11/30/18)           CEPHALOSPORINS (Verified  Allergy, Unknown, HIVES, 11/30/18)           GRAMICIDIN D (Verified  Allergy, Unknown, HIVES ALL OVER, 11/30/18)           LEVOFLOXACIN (Verified  Allergy, Unknown, HIVES, 11/30/18)           NEOMYCIN (Verified  Allergy, Unknown, HIVES ALL OVER, 11/30/18)           NEOMYCIN SULFATE (Verified  Allergy, Unknown, HIVES ALL OVER, 11/30/18)           POLYMYXIN B (Verified  Allergy, Unknown, HIVES ALL OVER, 11/30/18)           POLYMYXIN B SULFATE (Verified  Allergy, Unknown, HIVES ALL OVER, 11/30/18)           SULFA (SULFONAMIDE ANTIBIOTICS) (Verified  Adverse Reaction, Unknown,     NAUSEA, 11/30/18)            Medications      Last Reconciled on 1/25/19 10:25 by YOLY HATHAWAY      Potassium Chloride (K-Dur*) 20 Meq Tab.er.prt      20 MEQ PO BID, #60 TAB 0 Refills         Reported         4/18/18       Hydrochlorothiazide (Hydrochlorothiazide*) 25 Mg Tablet      25 MG PO QDAY, #30 TAB 0 Refills         Reported         4/18/18       Sodium Chloride (Sodium Chloride) 1 Gm Tablet      1 GM PO QID, TAB         Reported         12/6/17       Cholecalciferol (Vitamin D3*) 1,000 Unit Tab      1000 UNITS PO QDAY, #30 TAB 0 Refills         Reported         4/20/17       Levothyroxine (Levothyroxine) 0.125 Mg Tablet      0.125 MG PO QDAY@07, #30 TAB 0 Refills         Reported         4/20/17       Acebutolol HCl (Acebutolol HCl) Unknown Strength Capsule      200 PO  BID, #90 CAP         Reported         12/17/16       Multivitamins (Multi-Day Vitamin) 1 Tab Tablet      1 TAB PO QDAY, #30 TAB 0 Refills         Reported         9/15/15       Flaxseed (Flax Seed Oil) 1,000 Mg Capsule      1000 MG PO QDAY, CAP         Reported         9/15/15      Medications Reviewed:  No Changes made to meds            IMPRESSION/PLAN      Impression      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      -M spike of 0.6.  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      -Brain MRI as needed      -Follows with Dr. Contreras            Pulmonary nodule - R91.1      -Continue use of inhalers as ordered.      -No chest pain or shortness of breath.  No hemoptysis.      -Repeat CT of chest with Dr. Massey in August 2018 was stable. Continue close     observation.            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            Notes      New Diagnostics      * PET [facility] NETSP, Routine         Dx: Abnormal SPEP - R77.8      * CMP Comp Metabolic Panel, Routine         Dx: Abnormal SPEP - R77.8      * CBC With Auto Diff, Routine         Dx: Abnormal SPEP - R77.8      * IMMUNOGLOBULIN QUANT IGAMQ, Routine         Dx: Abnormal SPEP - R77.8      * SPEP with Immuno (IEPS), Routine         Dx: Abnormal SPEP - R77.8      * IMMUNOFIX PROT ELECTROPH URINE, Routine         Dx: Abnormal SPEP - R77.8      * Free Light Chains Ka, Routine         Dx: Abnormal SPEP - R77.8      * Beta 2 Microglobulin, Routine         Dx: Abnormal SPEP - R77.8      * Path Review Peripheral Smear, Routine         Dx: Abnormal SPEP - R77.8            Plan      SPEP, UPEP, serum free light chains, immunoglobulins, beta 2 microglobulin, CBC,    CMP      Bone marrow biopsy ASAP.  Patient is agreeable.       PET scan to evaluate for lytic lesions associated with  plasma cell dyscrasias.       Return after above is complete. Approximately 3 weeks for results with Dr. Todd.                 Disclaimer: Converted document may not contain table formatting or lab diagrams. Please see [employer] for the authenticated document.

## 2021-05-28 NOTE — PROGRESS NOTES
Patient: GEORGETTE DAILY     Acct: [id number]     Report: #[id number]  UNIT #: [id number]     : 1940    Encounter Date:2018  PRIMARY CARE: SHORTY KEMP  ***Signed***  --------------------------------------------------------------------------------------------------------------------  NURSE INTAKE      Visit Type      Established Patient Visit            Chief Complaint      elevated immunoglobin            Referring Provider/Copies To      Referring Provider:  SHORTY KEMP            History and Present Illness      Past History      Ms. Georgette Daily is a 76 year old Caucausian female who presents today with her    daughter for evaluation for abnormal immunoglobulins. The patient is hard of     hearing and forgot her hearing aids today. Her daughter presents her history     today. Patient has a long history of tobacco abuse dating back to 50 years of 1     PPD. The daughter reports patient has had recent history of multiple falls and 2    days ago, fell asleep at her computer, falling out of the chair and woke up on     the floor. Patient recently completed a CT scan of the head at the [facility]     which the results of this are not available to me at this time. There is a     reported history of multiple blackouts and daughter states patient has an     implantable Holter monitor in to record the heart data. She sees Dr. Nieves as her    cardiologist.  Her PCP is Dr.Randall Kemp. Patient has been seeing Dr. Avila     for her breathing problems and was recently found to have a single pulmonary     nodule on CT.  Recently on antibotics for a lung infection.  She follows up with    Dr. Avila in May. Medical history includes: hard of hearing, black outs,     frequent falls,  hypercholesteremia, hypertension, hypothyroidism,  Meniere's     disease.  Surgeries include:  brain anurysm surgery x 5 dating back to ,     brain shunts x 2 surgeries, gallbladder removal, facial melanoma surgery, and      hystorectomy. Medications include: Acebutolol, Vitamin D 3, Flaxseed Oil, HCTZ,     Potassium tablets, Levothyroxine, and a MVI. The patient has been using a cane     for assistance for the last 12 years. ROS: reveals shorrtness of air, wheezing,     productive sounding cough, hard of hearing, obesity, and bilateral lower     extremity swelling. The daughter reports patient was given Lasix for 3 days by     Dr. Nieves for the lower extremity swelling which has not helped with the     swelling. Family history includes a mother with bone cancer, a brother with     diagnosis of melanoma, and her father who  of a hemmorhagic stroke.  Patient    has multiple reports allergies to multiple medications. See List.            -2016.  IgG 2159, IgA 509, IgM 125, WBC 11.40, Hgb 12.0, MCV 92, Plt     234.       -2016.  WBC 12.40, Hemoglobin 11.60, MCV 92.00, Platelet 299      -2016.   [id number], IgA 492, IgM 121, no M spike observed, WBC 10.40,     Hgb 11.60, Plt 267      -March 3, 2017.  IgG 1675, IgA 437, IgM 127. serum immunofixation, no M spike     observed. IgG monoclonal protein, asymmetrical gamma.       -2017.  Periphal Blood smear noted abnormal immunoglobulins.       -2017. Bilateral Mammogram - Benign mammogram.       -December 3, 2017. Patient had recent fall - Head CT performed - Stable exam. No    acute intracranial abnormalities are identified. Stable bilateral masses in the     visualized parotid glands measuring up to 1 cm possibly representing     intraparotid nodes or benign parotid neoplasms. Soft tissue laceration in the     posterior left vertex. No skull fractures are identified.      - .  CT chest, abdomen, pelvis showed no acute abnormality of the     chest, abdomen, or pelvis.  Stable appearance of ground glass opacity.  9 mm in     size.      -2018. WBC 9.72, Hemoglobin 12.80, Platelet count 230,000. BUN 12,      Creatinine 0.82. Ferritin 353, Iron 47, TIBC 295, Iron sat 16%. TSH 3.080.             HPI - Hematology Interim      Patient presents today for follow up for plasma cell dyscrasia. Was approached     by nursing manager, Ynes that patient was confused and did not know where she    was at. Was told patient had an appointment with NP but then was cancelled.             Have spoken with patient today. She answers all appropriate questions. She shows    me her calender where patient had follow up appointment on her calender with me.    Apparently was in the ER at [facility] for confusion last week with daughter. Patient     was found to be oriented. She has a previous history of 5 previous brain     aneurysm in the past.  She currently seems to be at her baseline orientation     when i have seen her in the past. CT of head which showed stable exam and no     acute intracranial abnormalities.             Labs reviewed: Iron panel was reviewed. Fereheme infusions ordered. B-12,     folate, thyroid panel are all normal. SPEP, serum free light chains are pending.            Most Recent Lab Findings      Laboratory Tests      11/6/18 10:20            PAST, FAMILY   Past Medical History      Past Medical History:  Arthritis, COPD, Emphysema, Hypertension, Short of Air,     Thyroid Disease      Hematology/Oncology (F):  Anemia      Other Hematology/Oncology      plasma cell dyscrasia            Family History      daughter.            Social History      Lives independently:  Yes      Occupation:  disabled      lives by self.            Tobacco Use      Tobacco status:  Current every day smoker (less than 1 ppd x 60 y)      Smoking packs/day:  1            Alcohol Use      Alcohol intake:  None            Substance Use      Substance use:  Denies use            REVIEW OF SYSTEMS      General:  Admits: Fatigue;          Denies: Appetite Change, Fever, Night Sweats, Weight Loss      Eye:  Admits: Corrective Lenses;          Denies:  Diplopia, Eye Redness      ENT:  Denies: Headache, Hearing Loss, Hoarseness, Seizures      Cardiovascular:  Denies: Chest Pain, Edema Ankles, Edema Legs, Irregular     Heartbeat      Respiratory:  Denies: Coughing Blood, Productive Cough      Gastrointestinal:  Denies: Constipation, Diarrhea, Nausea, Problem Swallowing      Genitourinary (female):  Denies: Blood in Urine, Decrease Urine Stream,     Incontinence      Musculoskeletal:  Denies: Back Pain, Leg Cramps, Muscle Pain      Integumentary:  Denies: Bleeds Easily, Bruises Easily, Rash      Neurologic:  Denies: Dizziness, Fainting, Numbness\Tingling, Paralysis      Psychiatric:  Denies: Anxiety, Confused      Endocrine:  Admits: Diabetes;          Denies: Excessive Sweating, Excessive Urination      Hematologic/Lymphatic:  Denies: Bruising, Bleeding, Enlarged Lymph Nodes,     Recurrent Infections      Reproductive:  Admits: Menopause;          Denies: Heavy Periods            VITAL SIGNS AND SCORES      Vitals      Height 5 ft 4.00 in / 162.56 cm      Weight 220 lbs 7.360 oz / 100.0 kg      BSA 2.04 m2      BMI 37.8 kg/m2      Temperature 96.8 F / 36 C - Temporal      Pulse 65      Blood Pressure 139/71 Sitting, Left Arm      Pulse Oximetry 97%, room air            Pain Score      Experiencing any pain?:  No      Pain Scale Used:  Numerical      Pain Intensity:  0            Fatigue Score      Experiencing any fatigue?:  No      Fatigue (0-10 scale):  0 (none)            EXAM      General Appearance:  Positive for: Alert, Oriented x3, Cooperative      Eye:  Positive for: Moist Conjunctiva, PERRLA, Reactive to Light      HEENT:  Positive for: Oropharynx clear;          Negative for: Dentition, Erythema      Neck:  Positive for: Full ROM      Respiratory:  Positive for: CTAB, Diminished Breath, Normal Respiratory Effort      Abdomen/Gastro:  Positive for: Normal Active Bowel Sounds;          Negative for: Distention, Hemmorrhoids, Hepatosplenomegaly       Cardiovascular:  Negative for: Click, Distant Heart Sounds, Murmur      Skin:  Negative for: Lesions, Normal Temperature, Normal Texture and Turgor,     Normal Tone, Rash      Psychiatric:  Positive for: AAO X 3, Appropriate Affect, Intact Judgement      Neurologic:  Negative for: Cranial Ner II-XII Intact, Dizziness, Dysphagia,     Focal Sensory Deficit, Headache, Weakness      Genitourinary:  Negative for: Bladder Distention      Musculoskeletal:  Positive for: Full ROM Lower Extremety, Full ROM Upper     Extremety, Full Muscle Strength, Full Muscle Tone      Lower Extremities:  Positive for: Normal Gait and Station, Pedal Pulses Intact,     Pedal Pulses Symetrical;          Negative for: Clubbing, Deformities      Upper Extremities:  Negative for: Clubbing, Deformities, Edema      Lymphatic:  Negative for: Axillary, Cervical, Supraclavicular            PREVENTION      Hx Influenza Vaccination:  Yes      Date Influenza Vaccine Given:  Oct 1, 2018      Influenza Vaccine Declined:  No      2 or More Falls Past Year?:  No      Fall Past Year with Injury?:  No      Hx Pneumococcal Vaccination:  Yes      Encouraged to follow-up with:  PCP regarding preventative exams.      Chart initiated by      laverne gallardo cma            ALLERGY/MEDS      Allergies      Coded Allergies:             PENICILLINS (Verified  Allergy, Severe, HIVES AND LIPS GET HARD, TROUBLE     BREATHING, 11/14/18)           AMOXICILLIN (Verified  Allergy, Unknown, RASH, 11/14/18)           ATORVASTATIN (Verified  Allergy, Unknown, EDEMA, 11/14/18)           BACITRACIN (Verified  Allergy, Unknown, HIVES ALL OVER, 11/14/18)           BACITRACIN ZINC (Verified  Allergy, Unknown, HIVES ALL OVER, 11/14/18)           CEFAZOLIN (Verified  Allergy, Unknown, HIVES, 11/14/18)           CEPHALEXIN (Verified  Allergy, Unknown, HIVES, 11/14/18)           CEPHALOSPORINS (Verified  Allergy, Unknown, HIVES, 11/14/18)           GRAMICIDIN D (Verified  Allergy,  Unknown, HIVES ALL OVER, 11/14/18)           LEVOFLOXACIN (Verified  Allergy, Unknown, HIVES, 11/14/18)           NEOMYCIN (Verified  Allergy, Unknown, HIVES ALL OVER, 11/14/18)           NEOMYCIN SULFATE (Verified  Allergy, Unknown, HIVES ALL OVER, 11/14/18)           POLYMYXIN B (Verified  Allergy, Unknown, HIVES ALL OVER, 11/14/18)           POLYMYXIN B SULFATE (Verified  Allergy, Unknown, HIVES ALL OVER, 11/14/18)           SULFA (SULFONAMIDE ANTIBIOTICS) (Verified  Adverse Reaction, Unknown,     NAUSEA, 11/14/18)            Medications      Last Reconciled on 11/14/18 13:23 by YOLY HATHAWAY      (ilevro)   No Conflict Check               Reported         6/21/18       prednisoLONE Acetate (Pred-Forte 1% Ophth*) 10 Ml Drops.susp      1 DROPS EYE RT QID, #1 BOTTLE 0 Refills         Reported         6/21/18       Tobramycin 0.3% Op Soln (Tobramycin 0.3% Op Soln) 5 Ml Bottle      1 DROPS EYE RT QID, BOTTLE         Reported         6/21/18       Ferrous Sulfate (Ferrous Sulfate*) 325 Mg Tablet      325 MG PO BID, #60 TAB 1 Refill         Prov: YOLY HATHAWAY onc         5/1/18       Potassium Chloride (K-Dur*) 20 Meq Tab.er.prt      20 MEQ PO BID, #60 TAB 0 Refills         Reported         4/18/18       Hydrochlorothiazide (Hydrochlorothiazide*) 25 Mg Tablet      25 MG PO QDAY, #30 TAB 0 Refills         Reported         4/18/18       Sodium Chloride (Sodium Chloride) 1 Gm Tablet      1 GM PO QID, TAB         Reported         12/6/17       Cholecalciferol (Vitamin D3*) 1,000 Unit Tab      1000 UNITS PO QDAY, #30 TAB 0 Refills         Reported         4/20/17       Levothyroxine (Levothyroxine) 0.125 Mg Tablet      0.125 MG PO QDAY@07, #30 TAB 0 Refills         Reported         4/20/17       Acebutolol HCl (Acebutolol HCl) Unknown Strength Capsule      200 PO BID, #90 CAP         Reported         12/17/16       Multivitamins (Multi-Day Vitamin) 1 Tab Tablet      1 TAB PO QDAY, #30 TAB 0 Refills          Reported         9/15/15       Flaxseed (Flax Seed Oil) 1,000 Mg Capsule      1000 MG PO QDAY, CAP         Reported         9/15/15      Medications Reviewed:  No Changes made to meds            IMPRESSION/PLAN      Impression      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      -M spike of 0.6.  Consistent with MGUS      -Free kappa light chain 76.  Free lambda light chain 46.9 on July .      -Check CBC today            Leukocytosis - D72.829      -WBC 11.6 last visit.       -No signs or symptoms of infection today.       -Check CBC today.             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      -Brain MRI as needed      CT of head without acute abnormalities.             Pulmonary nodule - R91.1      -Continue use of inhalers as ordered.      -No chest pain or shortness of breath.  No hemoptysis.      -Repeat CT of chest with Dr. Massey in August 2018 was stable. Continue close     observation.      Repeat CT of chest in February 2019.             Diagnosis      Notes      New Diagnostics      * CMP Comp Metabolic Panel, Routine         Dx: Elevated immunoglobulin A - R76.8      * Iron Profile, Routine      * Thyroid Profile, Routine         Dx: Elevated immunoglobulin A - R76.8      * B12      Dx: Elevated immunoglobulin A - R76.8      * Ferritin Level, Routine         Dx: Elevated immunoglobulin A - R76.8      * LDH, Routine         Dx: Elevated immunoglobulin A - R76.8      * CBC With Auto Diff, Routine         Dx: Elevated immunoglobulin A - R76.8      * SPEP with Immunofixation, Routine         Dx: Elevated immunoglobulin A - R76.8      * Free Light Chains Ka, Routine         Dx: Elevated immunoglobulin A - R76.8      * Screening Mammo, As Soon As Possible         Dx: Elevated immunoglobulin A - R76.8            Plan      Labs today: CBC, peripheral smear. CMP,  LDH. Iron panel, ferritin, folate and     thyroid panel      Will schedule Fereheme infusion since patient has RAYMON.       Return in 3 months with follow up with Dr. Miles.       Reschedule bilateral screening mammogram.            Patient Education      Patient Education Provided:  Yes            Topics Patient Counseled on      confusion      headaches                 Disclaimer: Converted document may not contain table formatting or lab diagrams. Please see [employer] for the authenticated document.

## 2021-05-28 NOTE — PROGRESS NOTES
Patient: GEORGETTE DAILY     Acct: [id number]     Report: #[id number]  UNIT #: [id number]     : 1940    Encounter Date:2019  PRIMARY CARE: SHORTY KEMP  ***Signed***  --------------------------------------------------------------------------------------------------------------------  NURSE INTAKE      Visit Type      Established Patient Visit            Chief Complaint       Plasma cell dyscrasia.      Smoldering multiple myeloma/MGUS.            Referring Provider/Copies To      Referring Provider:  SHORTY KEMP            History and Present Illness      Past History      Ms. Georgette Daily is a 76 year old Caucausian female who presents today with her    daughter for evaluation for abnormal immunoglobulins.  Her daughter presents her    history today. Patient has a long history of tobacco abuse dating back to 50     years of 1 PPD. The daughter reports patient has had recent history of multiple     falls and 2 days ago, fell asleep at her computer, falling out of the chair and     woke up on the floor. Patient recently completed a CT scan of the head at the [facility] which the results of this are not available to me at this time. There     is a reported history of multiple blackouts and daughter states patient has an     implantable Holter monitor in to record the heart data. She sees Dr. Nieves as her    cardiologist.  Her PCP is Dr.Randall Kemp. Patient has been seeing Dr. Avila     for her breathing problems and was recently found to have a single pulmonary     nodule on CT.  Recently on antibotics for a lung infection.  She follows up with    Dr. Avila in May. Medical history includes: hard of hearing, black outs,     frequent falls,  hypercholesteremia, hypertension, hypothyroidism,  Meniere's     disease.  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 Medications  include: Acebutolol, Vitamin D 3, Flaxseed Oil, HCTZ,     Potassium tablets, Levothyroxine, and a MVI. The patient has been using a cane     for assistance for the last 12 years. ROS: reveals shorrtness of air, wheezing,     productive sounding cough, hard of hearing, obesity, and bilateral lower     extremity swelling. The daughter reports patient was given Lasix for 3 days by     Dr. Nieves for the lower extremity swelling which has not helped with the     swelling. Family history includes a mother with bone cancer, a brother with     diagnosis of melanoma, and her father who  of a hemorrhagic stroke.  Patient    has multiple reports allergies to multiple medications. See List.            Myeloma Indices:              IgG 2159( )->2107()->1675(3/17)->2002()      , IgA 509->492()->437(3/17)->510()      , IgM 125()->121()->127(3/17)->127()            , WBC 11.40->9.72()      , Hgb 12.0()->11.6()->12.8()->13.2()        Plt 234->230()->230()                serum immunofixation, no M spike observed. IgG Lambda monoclonal protein,     asymmetrical gamma.       M-Bon -> 0.6( )            Free Kappa->78.1()->77.7()      Free Lambda->49.8()->38.2()      ratio->1.57()->2.03()            BM Biopsy done on 2019.      Cellularity 35%, no increase in plasma cells or blasts.      Flow cytometry negative.      Fish for myeloma panel negative.      Cytogenetics normal.            CT PET scan 2019 negative for any lesions in the bone or soft tissue.             -2017. Bilateral Mammogram - Benign mammogram.       -December 3, 2017. Patient had recent fall - Head CT performed - Stable exam. No    acute intracranial abnormalities are identified. Stable bilateral masses in the     visualized parotid glands measuring up to 1 cm possibly representing     intraparotid nodes or benign parotid neoplasms. Soft tissue  laceration in the     posterior left vertex. No skull fractures are identified.      - August .  CT chest, abdomen, pelvis showed no acute abnormality of the     chest, abdomen, or pelvis.  Stable appearance of ground glass opacity.  9 mm in     size.      -November 14, 2018. WBC 9.72, Hemoglobin 12.80, Platelet count 230,000. BUN 12,     Creatinine 0.82. Ferritin 353, Iron 47, TIBC 295, Iron sat 16%. TSH 3.080.             Past Hx                   HPI - Hematology Interim      Patient is accompanied by her daughter.  Currently no new complaints.  She is     using the walker for ambulation.  There is no back pain, frequent infections,     headache or other new symptoms.            Most Recent Imaging Findings      CT-PET scan 1/2019            CONCLUSION:   1.  Focal metabolic activity corresponding to several bilateral     parotid masses which       have been stable since 2011 the largest is only 11 mm.  These are likely benign     pleomorphic       adenomas or Warthin's tumors or lymph nodes.      2.  The thyroid gland is diffusely enlarged and hypermetabolic correlation with     thyroid function       tests would be helpful.      3.  No suspicious findings are demonstrated in the neck chest abdomen pelvis or     visualized skeletal       structures.      4  shunt tubing is again noted and is unchanged.              JOSE J HAN DO             Electronically Signed and Approved By: JOSE J HAN DO on 1/31/2019 at 17:00            PAST, FAMILY   Past Medical History      Past Medical History:  Arthritis, COPD, Emphysema, Hypertension, Mental Disease     (DEMENTIA), Short of Air, Thyroid Disease      Hematology/Oncology (F):  Anemia            Past Surgical History      Appendectomy, Cholecystectomy, Hysterectomy            Social History      Marital Status:  Single      Lives independently:  Yes      Occupation:  disabled            Tobacco Use      Tobacco status:  Current every day smoker (less  than 1 ppd x 60 y)      Smoking packs/day:  1            Alcohol Use      Alcohol intake:  None            Substance Use      Substance use:  Denies use            REVIEW OF SYSTEMS      General:  Admits: Fatigue (3);          Denies: Appetite Change, Fever, Night Sweats, Weight Gain, Weight Loss      Eye:  Denies: Blurred Vision, Corrective Lenses, Diplopia, Eye Irritation, Eye     Pain, Eye Redness, Spots in Vision, Vision Loss      ENT:  Denies: Headache, Hearing Loss, Hoarseness, Seizures, Sinus Congestion,     Sore Throat      Cardiovascular:  Admits: Edema Legs;          Denies: Chest Pain, Edema Ankles, Irregular Heartbeat, Palpitations      Respiratory:  Denies: Coughing Blood, Productive Cough, Shortness of Air,     Wheezing      Gastrointestinal:  Denies: Bloody Stools, Constipation, Diarrhea, Frequent     Heartburn, Nausea, Problem Swallowing, Tarry Stools, Unable to Control Bowels,     Vomiting      Genitourinary (female):  Denies: Blood in Urine, Decrease Urine Stream, Frequent    Urination, Incontinence, Painful Urination      Musculoskeletal:  Admits: Leg Cramps;          Denies: Back Pain, Muscle Pain, Muscle Weakness, Painful Joints, Swollen     Joints      Integumentary:  Denies: Bleeds Easily, Bruises Easily, Hair Changes, Jaundice,     Lesions, Mole Changes, Nail Changes, Pigment Changes, Rash, Skin Discoloration      Neurologic:  Denies: Dizziness, Fainting, Numbness\Tingling, Paralysis, Seizures      Psychiatric:  Admits: Memory Loss;          Denies: Anxiety, Confused, Depression, Disoriented      Endocrine:  Denies: Cold Intolerance, Diabetes, Excessive Sweating, Excessive     Thirst, Excessive Urination, Heat Intolerance, Flushing, Hyperthyroidism,     Hypothyroidism      Hematologic/Lymphatic:  Denies: Bruising, Bleeding, Enlarged Lymph Nodes,     Recurrent Infections, Transfusions      Reproductive:  Denies: Menopause, Heavy Periods, Pregnant, Still Menstruating            VITAL SIGNS AND  SCORES      Vitals      Height 5 ft 4.00 in / 162.56 cm      Weight 223 lbs 15.798 oz / 101.6 kg      BSA 2.05 m2      BMI 38.4 kg/m2      Temperature 97.0 F / 36.11 C - Temporal      Pulse 73      Respirations 18      Blood Pressure 129/46 Sitting, Right Arm      Pulse Oximetry 96%, RM AIR            Pain Score      Pain Scale Used:  Numerical      Pain Intensity:  0            Fatigue Score      Experiencing any fatigue?:  Yes      Fatigue (0-10 scale):  5            EXAM      General Appearance:  Positive for: Alert, Cooperative      Eye:  Positive for: Anicteric Sclerae, PERRLA      HEENT:  Positive for: Oropharynx clear      Respiratory:  Positive for: CTAB      Abdomen/Gastro:  Positive for: Soft;          Negative for: Tenderness      Cardiovascular:  Positive for: RRR      Skin:  Positive for: Normal Texture and Turgor      Psychiatric:  Positive for: Appropriate Affect      Neurologic:  Positive for: Cranial Ner II-XII Intact;          Negative for: Dysphagia, Focal Sensory Deficit, Headache      Lower Extremities:  Positive for: Edema;          Negative for: Clubbing            PREVENTION      Date Influenza Vaccine Given:  Oct 1, 2018      Influenza Vaccine Declined:  No      2 or More Falls Past Year?:  No      Fall Past Year with Injury?:  No      Encouraged to follow-up with:  PCP regarding preventative exams.      Chart initiated by      TINA ZAPIEN CMA            ALLERGY/MEDS      Allergies      Coded Allergies:             PENICILLINS (Verified  Allergy, Severe, HIVES AND LIPS GET HARD, TROUBLE     BREATHING, 11/30/18)           AMOXICILLIN (Verified  Allergy, Unknown, RASH, 11/30/18)           ATORVASTATIN (Verified  Allergy, Unknown, EDEMA, 11/30/18)           BACITRACIN (Verified  Allergy, Unknown, HIVES ALL OVER, 11/30/18)           BACITRACIN ZINC (Verified  Allergy, Unknown, HIVES ALL OVER, 11/30/18)           CEFAZOLIN (Verified  Allergy, Unknown, HIVES, 11/30/18)           CEPHALEXIN  (Verified  Allergy, Unknown, HIVES, 11/30/18)           CEPHALOSPORINS (Verified  Allergy, Unknown, HIVES, 11/30/18)           GRAMICIDIN D (Verified  Allergy, Unknown, HIVES ALL OVER, 11/30/18)           LEVOFLOXACIN (Verified  Allergy, Unknown, HIVES, 11/30/18)           NEOMYCIN (Verified  Allergy, Unknown, HIVES ALL OVER, 11/30/18)           NEOMYCIN SULFATE (Verified  Allergy, Unknown, HIVES ALL OVER, 11/30/18)           POLYMYXIN B (Verified  Allergy, Unknown, HIVES ALL OVER, 11/30/18)           POLYMYXIN B SULFATE (Verified  Allergy, Unknown, HIVES ALL OVER, 11/30/18)           SULFA (SULFONAMIDE ANTIBIOTICS) (Verified  Adverse Reaction, Unknown,     NAUSEA, 11/30/18)            Medications      Last Reconciled on 1/25/19 10:25 by YOLY HATHAWAY      Potassium Chloride (K-Dur*) 20 Meq Tab.er.prt      20 MEQ PO BID, #60 TAB 0 Refills         Reported         4/18/18       Hydrochlorothiazide (Hydrochlorothiazide*) 25 Mg Tablet      25 MG PO QDAY, #30 TAB 0 Refills         Reported         4/18/18       Sodium Chloride (Sodium Chloride) 1 Gm Tablet      1 GM PO QID, TAB         Reported         12/6/17       Cholecalciferol (Vitamin D3*) 1,000 Unit Tab      1000 UNITS PO QDAY, #30 TAB 0 Refills         Reported         4/20/17       Levothyroxine (Levothyroxine) 0.125 Mg Tablet      0.125 MG PO QDAY@07, #30 TAB 0 Refills         Reported         4/20/17       Acebutolol HCl (Acebutolol HCl) Unknown Strength Capsule      200 PO BID, #90 CAP         Reported         12/17/16       Multivitamins (Multi-Day Vitamin) 1 Tab Tablet      1 TAB PO QDAY, #30 TAB 0 Refills         Reported         9/15/15       Flaxseed (Flax Seed Oil) 1,000 Mg Capsule      1000 MG PO QDAY, CAP         Reported         9/15/15      Medications Reviewed:  No Changes made to meds            IMPRESSION/PLAN      Diagnosis      MGUS (monoclonal gammopathy of unknown significance) - 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  She has multiple medical problems     including dementia, minimal ambulation, heart disease, hypertension, brain     shunts etc. given the fact that she does not have endorgan damage and protein     levels are stable observation will be recommended.  My plan will be to see her     in 6 months with repeat CBC, CMP, SPEP and free light chains.  If there is no     major increase in the protein level we may decrease the visits to once a year.      I have explained this in detail to the patient's daughter.  They understand this    very well.      She will follow-up with her primary and cardiologist for her other medical     problems.      New Diagnostics      * CBC With Auto Diff, 6 Months         Dx: MGUS (monoclonal gammopathy of unknown significance) - D47.2      * CMP Comp Metabolic Panel, 6 Months         Dx: MGUS (monoclonal gammopathy of unknown significance) - D47.2      * SPEP with Immuno (IEPS), 6 Months         Dx: MGUS (monoclonal gammopathy of unknown significance) - D47.2      * Free Light Chains Ka, 6 Months         Dx: MGUS (monoclonal gammopathy of unknown significance) - D47.2            Plan      1.  Observation for now.      2.  Follow-up in 6 months with CBC CMP SPEP and free light chains.      3.  After next visit we will try to reduce the frequency of follow-up visits.            Thanks for allowing us to participate in taking care of this patient.            Patient Education      Patient Education Provided:  Yes                 Disclaimer: Converted document may not contain table formatting or lab diagrams. Please see [employer] for the authenticated document.

## 2021-06-11 ENCOUNTER — TRANSCRIBE ORDERS (OUTPATIENT)
Dept: ADMINISTRATIVE | Facility: HOSPITAL | Age: 81
End: 2021-06-11

## 2021-06-11 DIAGNOSIS — R13.10 DYSPHAGIA, UNSPECIFIED TYPE: Primary | ICD-10-CM

## 2021-06-18 ENCOUNTER — HOSPITAL ENCOUNTER (OUTPATIENT)
Dept: GENERAL RADIOLOGY | Facility: HOSPITAL | Age: 81
Discharge: HOME | End: 2021-06-18
Admitting: INTERNAL MEDICINE

## 2021-06-18 DIAGNOSIS — R13.10 DYSPHAGIA, UNSPECIFIED TYPE: ICD-10-CM

## 2021-06-18 PROCEDURE — 92611 MOTION FLUOROSCOPY/SWALLOW: CPT

## 2021-06-18 PROCEDURE — 74230 X-RAY XM SWLNG FUNCJ C+: CPT

## 2021-06-18 NOTE — THERAPY EVALUATION
Outpatient - Speech Language Pathology   Swallow Modified Barium Swallow Study  Marc     Patient Name: Kezia Garcia  : 1940  MRN: [id number]  Today's Date: 2021               Admit Date: 2021    Visit Dx:     ICD-10-CM ICD-9-CM   1. Dysphagia, unspecified type  R13.10 787.20     There is no problem list on file for this patient.    No past medical history on file.  No past surgical history on file.       DATE OF SERVICE: 2021    PERTINENT INFORMATION:  This patient is a 80 year old female referred for modified barium swallow study due to history of dysphagia..    Patient was referred for an MBSS by  to rule out aspiration as well as to determine appropriate treatment plan for this patient.      PROCEDURE:    Patient was alert and cooperative.  The patient was viewed in lateral projection.  The following Ba consistencies were administered: Nectar thick liquids, honey thick liquids, purée consistencies and soft solids..  The following compensatory swallowing strategies were performed: Bolus size modification      RESULTS:    1.  Oral stage is characterized by sluggish bolus preparation and mildly reduced bolus control.  Appears to have timely oral transit.  Pharyngeal phase is delayed with vallecular pooling prior to swallow with all trials presented.  Spillover to the piriform sinuses was noted with large bolus volumes of honey thick and with nectar thick liquids.  Base of tongue retraction laryngeal elevation and epiglottic retroflexion all appear to be within functional limits.  Patient with silent aspiration with nectar thick liquids noted even with small bolus volumes.  Silent aspiration noted with Trials of honey thick liquids.  Patient tolerated spoonfed trials of honey thick liquids without penetration or aspiration.  No significant pharyngeal residue was noted after the swallow.      IMPRESSIONS:    Patient demonstrated moderate to severe oropharyngeal dysphagia  characterized by silent aspiration with nectar and large bolus volumes of honey thick liquids.     FUNCTIONAL DEFICIT: Patient scored level 4 of 7 on Functional Communication Measures for swallowing indicating a 40-59% limitation in function for current status, goal status, and discharge status.      RECOMMENDATIONS:   1.  Dysphagia purée diet-spoonfed honey thick liquids  2.  Small bites/drinks  3.  90 degrees upright for all intake      Yes patient/responsible party agrees with the plan of care and has been informed of all alternatives, risks and benefits.    Thank you for this referral.    EDUCATION  The patient has been educated in the following areas:   Modified Diet Instruction.                                                                             Time Calculation:                Sheree Diallo, SLP  6/18/2021

## 2021-12-25 ENCOUNTER — APPOINTMENT (OUTPATIENT)
Dept: GENERAL RADIOLOGY | Facility: HOSPITAL | Age: 81
End: 2021-12-25

## 2021-12-25 ENCOUNTER — HOSPITAL ENCOUNTER (EMERGENCY)
Facility: HOSPITAL | Age: 81
Discharge: NURSING, REHAB OR LONG-TERM CARE | End: 2021-12-25
Attending: EMERGENCY MEDICINE | Admitting: EMERGENCY MEDICINE

## 2021-12-25 ENCOUNTER — APPOINTMENT (OUTPATIENT)
Dept: CT IMAGING | Facility: HOSPITAL | Age: 81
End: 2021-12-25

## 2021-12-25 VITALS
BODY MASS INDEX: 30.45 KG/M2 | RESPIRATION RATE: 18 BRPM | HEIGHT: 64 IN | HEART RATE: 56 BPM | WEIGHT: 178.35 LBS | TEMPERATURE: 98.1 F | OXYGEN SATURATION: 96 % | DIASTOLIC BLOOD PRESSURE: 51 MMHG | SYSTOLIC BLOOD PRESSURE: 132 MMHG
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
## 2021-12-25 DIAGNOSIS — S50.01XA CONTUSION OF RIGHT ELBOW, INITIAL ENCOUNTER: ICD-10-CM

## 2021-12-25 DIAGNOSIS — S00.03XA CONTUSION OF SCALP, INITIAL ENCOUNTER: ICD-10-CM

## 2021-12-25 DIAGNOSIS — J01.00 ACUTE MAXILLARY SINUSITIS, RECURRENCE NOT SPECIFIED: ICD-10-CM

## 2021-12-25 PROCEDURE — 99283 EMERGENCY DEPT VISIT LOW MDM: CPT

## 2021-12-25 PROCEDURE — 73080 X-RAY EXAM OF ELBOW: CPT

## 2021-12-25 PROCEDURE — 70450 CT HEAD/BRAIN W/O DYE: CPT

## 2021-12-25 RX ORDER — AZITHROMYCIN 250 MG/1
TABLET, FILM COATED ORAL
Qty: 6 TABLET | Refills: 0 | Status: ON HOLD | OUTPATIENT
Start: 2021-12-25 | End: 2022-05-01

## 2021-12-25 NOTE — ED PROVIDER NOTES
Subjective   Reported by nursing home that patient was trying to ambulate to the bathroom when she fell.  Patient complaining of contusion and small abrasion to the back of her scalp and left elbow pain.      History provided by:  Patient, EMS personnel and nursing home   used: No    Fall  Mechanism of injury: fall    Injury location:  Head/neck and shoulder/arm  Head/neck injury location:  Scalp  Shoulder/arm injury location:  R elbow  Incident location:  Home  Time since incident: Earlier today.  Arrived directly from scene: yes    Fall:     Fall occurred: Patient fell while ambulating to the restroom.    Height of fall:  Standing height    Impact surface:  Hard floor    Point of impact:  Head    Entrapped after fall: no    Suspicion of alcohol use: no    Suspicion of drug use: no    Prior to arrival data:     Responsiveness at scene:  Alert    Orientation at scene: Patient suffers from dementia.    Loss of consciousness: no      Airway interventions:  None    Breathing interventions:  None    IV access status:  None    IO access:  None    Fluids administered:  None    Cardiac interventions:  None    Medications administered:  None    Immobilization:  None    Airway condition since incident:  Stable    Breathing condition since incident:  Stable    Circulation condition since incident:  Stable  Associated symptoms: no abdominal pain, no back pain, no blindness, no chest pain, no difficulty breathing, no headaches, no hearing loss, no loss of consciousness, no nausea, no neck pain, no seizures and no vomiting    Head Injury  Associated symptoms: no difficulty breathing, no headaches, no hearing loss, no loss of consciousness, no nausea, no neck pain, no seizures and no vomiting        Review of Systems   Constitutional: Negative for chills and fever.   HENT: Negative for congestion, ear pain, hearing loss and sore throat.         Small contusion abrasion to back of scalp.   Eyes: Negative for  blindness and pain.   Respiratory: Negative for cough, chest tightness and shortness of breath.    Cardiovascular: Negative for chest pain.   Gastrointestinal: Negative for abdominal pain, diarrhea, nausea and vomiting.   Genitourinary: Negative for flank pain and hematuria.   Musculoskeletal: Negative for back pain, joint swelling and neck pain.        Patient states that her right elbow does hurt.   Skin: Negative for pallor.   Neurological: Negative for seizures, loss of consciousness and headaches.   All other systems reviewed and are negative.      No past medical history on file.    Allergies   Allergen Reactions   • Bacitracin Unknown - High Severity   • Cefazolin Unknown - High Severity   • Cephalosporins Unknown - High Severity   • Neomycin Unknown - High Severity   • Penicillins Unknown - High Severity   • Polymox [Amoxicillin] Unknown - High Severity   • Prednisone Unknown - High Severity   • Quinolones Unknown - High Severity   • Statins Unknown - High Severity   • Sulfa Antibiotics Unknown - High Severity   • Trimethoprim Unknown - High Severity       No past surgical history on file.    No family history on file.    Social History     Socioeconomic History   • Marital status:            Objective   Physical Exam  Vitals and nursing note reviewed.   Constitutional:       General: She is not in acute distress.     Appearance: Normal appearance. She is not toxic-appearing.   HENT:      Head: Normocephalic. Abrasion and contusion present. No raccoon eyes or Salgado's sign.      Jaw: There is normal jaw occlusion.        Mouth/Throat:      Mouth: Mucous membranes are moist.   Eyes:      General: No scleral icterus.  Cardiovascular:      Rate and Rhythm: Normal rate and regular rhythm.      Pulses: Normal pulses.      Heart sounds: Normal heart sounds.   Pulmonary:      Effort: Pulmonary effort is normal. No respiratory distress.      Breath sounds: Normal breath sounds.   Abdominal:      General:  Abdomen is flat.      Palpations: Abdomen is soft.      Tenderness: There is no abdominal tenderness.   Musculoskeletal:         General: Normal range of motion.      Cervical back: Normal range of motion and neck supple.   Skin:     General: Skin is warm and dry.   Neurological:      General: No focal deficit present.      Mental Status: She is alert and oriented to person, place, and time. Mental status is at baseline.   Psychiatric:         Mood and Affect: Mood normal.         Behavior: Behavior normal.         Procedures           ED Course                                                 MDM  Number of Diagnoses or Management Options  Acute maxillary sinusitis, recurrence not specified  Contusion of right elbow, initial encounter  Contusion of scalp, initial encounter  Fall, initial encounter  Diagnosis management comments: I have spoken with Ms. Garcia and family. I have explained the patient´s condition, diagnoses and treatment plan based on the information available to me at this time. I have answered the patient and family questions and addressed any concerns. The patient has a good  understanding of the patient´s diagnosis, condition, and treatment plan as can be expected at this point. The vital signs have been stable. The patient´s condition is stable and appropriate for discharge from the emergency department.      The patient will pursue further outpatient evaluation with the primary care physician or other designated or consulting physician as outlined in the discharge instructions. They are agreeable to this plan of care and follow-up instructions have been explained in detail. The patient and family has received these instructions in written format and have expressed an understanding of the discharge instructions. The patient is aware that any significant change in condition or worsening of symptoms should prompt an immediate return to this or the closest emergency department or call to 911.        Amount and/or Complexity of Data Reviewed  Tests in the radiology section of CPT®: reviewed and ordered    Risk of Complications, Morbidity, and/or Mortality  Presenting problems: moderate  Diagnostic procedures: low  Management options: low    Patient Progress  Patient progress: stable      Final diagnoses:   Fall, initial encounter   Contusion of scalp, initial encounter   Contusion of right elbow, initial encounter   Acute maxillary sinusitis, recurrence not specified       ED Disposition  ED Disposition     ED Disposition Condition Comment    Discharge Stable           Myrna Dimas PA  [address] [place] [id number]  [phone]    In 3 days           Medication List      New Prescriptions    azithromycin 250 MG tablet  Commonly known as: Zithromax Z-Roni  Take 2 tablets by mouth on day 1, then 1 tablet daily on days 2-5           Where to Get Your Medications      You can get these medications from any pharmacy    Bring a paper prescription for each of these medications  · azithromycin 250 MG tablet          Magan Camargo, APRN  12/25/21 [id number]

## 2022-03-05 ENCOUNTER — APPOINTMENT (OUTPATIENT)
Dept: GENERAL RADIOLOGY | Facility: HOSPITAL | Age: 82
End: 2022-03-05

## 2022-03-05 ENCOUNTER — HOSPITAL ENCOUNTER (EMERGENCY)
Facility: HOSPITAL | Age: 82
Discharge: NURSING, REHAB OR LONG-TERM CARE | End: 2022-03-06
Attending: EMERGENCY MEDICINE | Admitting: EMERGENCY MEDICINE

## 2022-03-05 DIAGNOSIS — R06.00 DYSPNEA, UNSPECIFIED TYPE: Primary | ICD-10-CM

## 2022-03-05 LAB
ALBUMIN SERPL-MCNC: 3.8 G/DL (ref 3.5–5.2)
ALBUMIN/GLOB SERPL: 1.1 G/DL
ALP SERPL-CCNC: 44 U/L (ref 39–117)
ALT SERPL W P-5'-P-CCNC: 7 U/L (ref 1–33)
ANION GAP SERPL CALCULATED.3IONS-SCNC: 11.5 MMOL/L (ref 5–15)
AST SERPL-CCNC: 17 U/L (ref 1–32)
BASOPHILS # BLD AUTO: 0.04 10*3/MM3 (ref 0–0.2)
BASOPHILS NFR BLD AUTO: 0.3 % (ref 0–1.5)
BILIRUB SERPL-MCNC: 0.2 MG/DL (ref 0–1.2)
BUN SERPL-MCNC: 20 MG/DL (ref 8–23)
BUN/CREAT SERPL: 15.9 (ref 7–25)
CALCIUM SPEC-SCNC: 9.5 MG/DL (ref 8.6–10.5)
CHLORIDE SERPL-SCNC: 99 MMOL/L (ref 98–107)
CO2 SERPL-SCNC: 26.5 MMOL/L (ref 22–29)
CREAT SERPL-MCNC: 1.26 MG/DL (ref 0.57–1)
DEPRECATED RDW RBC AUTO: 50.3 FL (ref 37–54)
EGFRCR SERPLBLD CKD-EPI 2021: 43.5 ML/MIN/1.73
EOSINOPHIL # BLD AUTO: 0.14 10*3/MM3 (ref 0–0.4)
EOSINOPHIL NFR BLD AUTO: 1 % (ref 0.3–6.2)
ERYTHROCYTE [DISTWIDTH] IN BLOOD BY AUTOMATED COUNT: 13.8 % (ref 12.3–15.4)
GLOBULIN UR ELPH-MCNC: 3.6 GM/DL
GLUCOSE BLDC GLUCOMTR-MCNC: 132 MG/DL (ref 70–99)
GLUCOSE SERPL-MCNC: 128 MG/DL (ref 65–99)
HCT VFR BLD AUTO: 35.8 % (ref 34–46.6)
HGB BLD-MCNC: 11.8 G/DL (ref 12–15.9)
HOLD SPECIMEN: NORMAL
HOLD SPECIMEN: NORMAL
IMM GRANULOCYTES # BLD AUTO: 0.1 10*3/MM3 (ref 0–0.05)
IMM GRANULOCYTES NFR BLD AUTO: 0.7 % (ref 0–0.5)
LYMPHOCYTES # BLD AUTO: 2.72 10*3/MM3 (ref 0.7–3.1)
LYMPHOCYTES NFR BLD AUTO: 20 % (ref 19.6–45.3)
MCH RBC QN AUTO: 32.8 PG (ref 26.6–33)
MCHC RBC AUTO-ENTMCNC: 33 G/DL (ref 31.5–35.7)
MCV RBC AUTO: 99.4 FL (ref 79–97)
MONOCYTES # BLD AUTO: 0.7 10*3/MM3 (ref 0.1–0.9)
MONOCYTES NFR BLD AUTO: 5.1 % (ref 5–12)
NEUTROPHILS NFR BLD AUTO: 72.9 % (ref 42.7–76)
NEUTROPHILS NFR BLD AUTO: 9.92 10*3/MM3 (ref 1.7–7)
NRBC BLD AUTO-RTO: 0 /100 WBC (ref 0–0.2)
NT-PROBNP SERPL-MCNC: 181.9 PG/ML (ref 0–1800)
PLATELET # BLD AUTO: 261 10*3/MM3 (ref 140–450)
PMV BLD AUTO: 11.6 FL (ref 6–12)
POTASSIUM SERPL-SCNC: 3.6 MMOL/L (ref 3.5–5.2)
PROT SERPL-MCNC: 7.4 G/DL (ref 6–8.5)
RBC # BLD AUTO: 3.6 10*6/MM3 (ref 3.77–5.28)
SODIUM SERPL-SCNC: 137 MMOL/L (ref 136–145)
TROPONIN T SERPL-MCNC: <0.01 NG/ML (ref 0–0.03)
WBC NRBC COR # BLD: 13.62 10*3/MM3 (ref 3.4–10.8)
WHOLE BLOOD HOLD SPECIMEN: NORMAL
WHOLE BLOOD HOLD SPECIMEN: NORMAL

## 2022-03-05 PROCEDURE — 93005 ELECTROCARDIOGRAM TRACING: CPT

## 2022-03-05 PROCEDURE — 80053 COMPREHEN METABOLIC PANEL: CPT | Performed by: EMERGENCY MEDICINE

## 2022-03-05 PROCEDURE — 99284 EMERGENCY DEPT VISIT MOD MDM: CPT

## 2022-03-05 PROCEDURE — 93010 ELECTROCARDIOGRAM REPORT: CPT | Performed by: INTERNAL MEDICINE

## 2022-03-05 PROCEDURE — 85025 COMPLETE CBC W/AUTO DIFF WBC: CPT

## 2022-03-05 PROCEDURE — 83880 ASSAY OF NATRIURETIC PEPTIDE: CPT

## 2022-03-05 PROCEDURE — 84484 ASSAY OF TROPONIN QUANT: CPT

## 2022-03-05 PROCEDURE — 82962 GLUCOSE BLOOD TEST: CPT

## 2022-03-05 PROCEDURE — 36415 COLL VENOUS BLD VENIPUNCTURE: CPT

## 2022-03-05 PROCEDURE — 71045 X-RAY EXAM CHEST 1 VIEW: CPT

## 2022-03-05 PROCEDURE — 93005 ELECTROCARDIOGRAM TRACING: CPT | Performed by: EMERGENCY MEDICINE

## 2022-03-05 RX ORDER — LATANOPROST 50 UG/ML
1 SOLUTION/ DROPS OPHTHALMIC NIGHTLY
COMMUNITY
End: 2022-05-08

## 2022-03-05 RX ORDER — DONEPEZIL HYDROCHLORIDE 10 MG/1
10 TABLET, FILM COATED ORAL NIGHTLY
COMMUNITY

## 2022-03-05 RX ORDER — GUAIFENESIN 600 MG/1
600 TABLET, EXTENDED RELEASE ORAL 2 TIMES DAILY
Status: ON HOLD | COMMUNITY
End: 2022-05-04 | Stop reason: SDUPTHER

## 2022-03-05 RX ORDER — LOPERAMIDE HYDROCHLORIDE 2 MG/1
2 CAPSULE ORAL 4 TIMES DAILY PRN
COMMUNITY

## 2022-03-05 RX ORDER — MEMANTINE HYDROCHLORIDE 10 MG/1
10 TABLET ORAL 2 TIMES DAILY
COMMUNITY

## 2022-03-05 RX ORDER — HYDROCHLOROTHIAZIDE 25 MG/1
25 TABLET ORAL DAILY
COMMUNITY
End: 2022-05-04 | Stop reason: HOSPADM

## 2022-03-05 RX ORDER — KETOCONAZOLE 20 MG/G
1 CREAM TOPICAL 2 TIMES DAILY
COMMUNITY
End: 2022-05-08

## 2022-03-05 RX ORDER — FENOFIBRATE 160 MG/1
160 TABLET ORAL DAILY
COMMUNITY

## 2022-03-05 RX ORDER — ACETAMINOPHEN 500 MG
500 TABLET ORAL EVERY 6 HOURS PRN
COMMUNITY

## 2022-03-05 RX ORDER — ALBUTEROL SULFATE 2.5 MG/3ML
2.5 SOLUTION RESPIRATORY (INHALATION) EVERY 4 HOURS PRN
COMMUNITY

## 2022-03-05 RX ORDER — LIDOCAINE HCL 4% 4 G/100G
1 CREAM TOPICAL 2 TIMES DAILY
COMMUNITY
End: 2022-05-08

## 2022-03-05 RX ORDER — SODIUM CHLORIDE 0.9 % (FLUSH) 0.9 %
10 SYRINGE (ML) INJECTION AS NEEDED
Status: DISCONTINUED | OUTPATIENT
Start: 2022-03-05 | End: 2022-03-06 | Stop reason: HOSPADM

## 2022-03-05 RX ORDER — SODIUM CHLORIDE 1000 MG
1 TABLET, SOLUBLE MISCELLANEOUS 4 TIMES DAILY
COMMUNITY
End: 2022-05-04 | Stop reason: HOSPADM

## 2022-03-05 RX ORDER — LEVOTHYROXINE SODIUM 300 UG/1
300 TABLET ORAL DAILY
COMMUNITY
End: 2022-09-01 | Stop reason: HOSPADM

## 2022-03-05 RX ORDER — LEVETIRACETAM 750 MG/1
750 TABLET ORAL DAILY
COMMUNITY

## 2022-03-05 RX ORDER — DIMENHYDRINATE 50 MG
1000 TABLET ORAL DAILY
COMMUNITY

## 2022-03-06 VITALS
DIASTOLIC BLOOD PRESSURE: 65 MMHG | HEIGHT: 64 IN | SYSTOLIC BLOOD PRESSURE: 126 MMHG | BODY MASS INDEX: 29.73 KG/M2 | RESPIRATION RATE: 20 BRPM | HEART RATE: 58 BPM | OXYGEN SATURATION: 90 % | TEMPERATURE: 97.4 F | WEIGHT: 174.16 LBS

## 2022-03-06 LAB
ARTERIAL PATENCY WRIST A: POSITIVE
BASE EXCESS BLDA CALC-SCNC: 1.4 MMOL/L (ref -2–2)
BDY SITE: ABNORMAL
COHGB MFR BLD: 0.1 % (ref 0–1.5)
FHHB: 9.2 % (ref 0–5)
HCO3 BLDA-SCNC: 26 MMOL/L (ref 22–26)
HGB BLDA-MCNC: 12.3 G/DL (ref 11.7–14.6)
INHALED O2 CONCENTRATION: 21 %
LACTATE BLDA-SCNC: ABNORMAL MMOL/L
METHGB BLD QL: 0.3 % (ref 0–1.5)
MODALITY: ABNORMAL
OXYHGB MFR BLDV: 90.4 % (ref 94–99)
PCO2 BLDA: 40.8 MM HG (ref 35–45)
PH BLDA: 7.42 PH UNITS (ref 7.35–7.45)
PO2 BLD: 261 MM[HG] (ref 0–500)
PO2 BLDA: 54.8 MM HG (ref 80–100)
QT INTERVAL: 442 MS
SAO2 % BLDCOA: 90.8 % (ref 95–99)

## 2022-03-06 PROCEDURE — 82375 ASSAY CARBOXYHB QUANT: CPT | Performed by: EMERGENCY MEDICINE

## 2022-03-06 PROCEDURE — 36600 WITHDRAWAL OF ARTERIAL BLOOD: CPT | Performed by: EMERGENCY MEDICINE

## 2022-03-06 PROCEDURE — 82805 BLOOD GASES W/O2 SATURATION: CPT | Performed by: EMERGENCY MEDICINE

## 2022-03-06 PROCEDURE — 83050 HGB METHEMOGLOBIN QUAN: CPT | Performed by: EMERGENCY MEDICINE

## 2022-03-06 NOTE — ED PROVIDER NOTES
Time: 11:49 PM EST  Arrived by: EMS  Chief Complaint: Shortness of breath  History provided by: EMS  History is limited by: Dementia    History of Present Illness:    Kezia Garcia is a 81 y.o. female who presents to the emergency department today with complaints of moderate and constant shortness of breath. The patient's son was previously at bedside, but has since left. The patient arrives today from [facility]. EMS reports that they were contacted today due to the patient's low oxygen saturations. They were informed that the patient was found with decreased responsiveness by a staff member around 1830 this evening. Her oxygen saturations were measuring in the 60s on room air at that time. The patient was subsequently placed on 2L nasal cannula. Her oxygen saturations did elevate into the 90s following intervention.    Per EMS, the patient denies all other complaints. She does have a history of severe dementia. There are no other known acute complaints at this time.       History provided by:  Patient  History limited by:  Dementia   used: No    Shortness of Breath  Severity:  Moderate  Onset quality:  Gradual  Duration:  2 hours  Timing:  Constant  Progression:  Improving  Chronicity:  New  Context comment:  Patient had low oxygen saturations at her nursing home with decreased responsiveness.  Relieved by:  Oxygen  Worsened by:  Nothing  Associated symptoms: no chest pain, no cough, no fever, no neck pain, no rash and no vomiting    Risk factors comment:  History of dementia.          Similar Symptoms Previously: No.  Recently seen: Patient was last seen in the ED on 12/25/2021 with a fall and head injury.      Patient Care Team  Primary Care Provider: Lauretn Blanco MD    Past Medical History:     Allergies   Allergen Reactions   • Bacitracin Unknown - High Severity   • Cefazolin Unknown - High Severity   • Cephalosporins Unknown - High Severity   • Neomycin Unknown -  High Severity   • Penicillins Unknown - High Severity   • Polymox [Amoxicillin] Unknown - High Severity   • Prednisone Unknown - High Severity   • Quinolones Unknown - High Severity   • Statins Unknown - High Severity   • Sulfa Antibiotics Unknown - High Severity   • Trimethoprim Unknown - High Severity     History reviewed. No pertinent past medical history.  History reviewed. No pertinent surgical history.  History reviewed. No pertinent family history.    Home Medications:  Prior to Admission medications    Medication Sig Start Date End Date Taking? Authorizing Provider   acetaminophen (TYLENOL) 500 MG tablet Take 500 mg by mouth Every 6 (Six) Hours As Needed for Mild Pain  or Fever.    Branden Marion MD   albuterol (PROVENTIL) (2.5 MG/3ML) 0.083% nebulizer solution Take 2.5 mg by nebulization Every 4 (Four) Hours As Needed for Wheezing or Shortness of Air.    Branden Marion MD   azithromycin (Zithromax Z-Roni) 250 MG tablet Take 2 tablets by mouth on day 1, then 1 tablet daily on days 2-5 12/25/21   Magan Camargo APRN   Cholecalciferol (Vitamin D3) 25 MCG (1000 UT) capsule Take 1,000 Units by mouth Daily.    Branden Marion MD   donepezil (ARICEPT) 10 MG tablet Take 10 mg by mouth Every Night.    Branden Marion MD   fenofibrate 160 MG tablet Take 160 mg by mouth Daily.    Branden Marion MD   Flaxseed, Linseed, (Flax Seed Oil) 1000 MG capsule Take 1 tablet by mouth Daily.    Branden Marion MD   guaiFENesin (MUCINEX) 600 MG 12 hr tablet Take 1,200 mg by mouth 2 (Two) Times a Day.    Branden Marion MD   hydroCHLOROthiazide (HYDRODIURIL) 25 MG tablet Take 25 mg by mouth Daily.    Branden Marion MD   ketoconazole (NIZORAL) 2 % cream Apply 1 application topically to the appropriate area as directed 2 (Two) Times a Day. To back left shoulder    Branden Marion MD   latanoprost (XALATAN) 0.005 % ophthalmic solution Administer 1 drop to both eyes Every Night.     "Branden Marion MD   levETIRAcetam (KEPPRA) 500 MG tablet Take 500 mg by mouth 2 (Two) Times a Day.    Branden Marion MD   levothyroxine (SYNTHROID, LEVOTHROID) 25 MCG tablet Take 25 mcg by mouth Daily.    Branden Marion MD   Lidocaine HCl (Aspercreme Lidocaine) 4 % cream Apply 1 application topically to the appropriate area as directed 2 (Two) Times a Day. To lower back    Branden Marion MD   loperamide (IMODIUM) 2 MG capsule Take 2 mg by mouth 4 (Four) Times a Day As Needed for Diarrhea.    Branden Marion MD   memantine (NAMENDA) 10 MG tablet Take 10 mg by mouth 2 (Two) Times a Day.    Branden Marion MD   sodium chloride 1 g tablet Take 1 g by mouth 4 (Four) Times a Day.    Branden Marion MD        Social History:   PT  has no history on file for tobacco use, alcohol use, and drug use.    Record Review:  I have reviewed the patient's records in [employer] [facility]     Review of Systems  Review of Systems   Constitutional: Negative for chills and fever.   HENT: Negative for nosebleeds.    Eyes: Negative for redness.   Respiratory: Positive for shortness of breath. Negative for cough.    Cardiovascular: Negative for chest pain.   Gastrointestinal: Negative for diarrhea and vomiting.   Genitourinary: Negative for dysuria and frequency.   Musculoskeletal: Negative for back pain and neck pain.   Skin: Negative for rash.   Neurological: Negative for seizures and syncope.   All other systems reviewed and are negative.       Physical Exam  /65   Pulse 58   Temp 97.4 °F (36.3 °C) (Oral)   Resp 20   Ht 162.6 cm (64\")   Wt 79 kg (174 lb 2.6 oz)   SpO2 90%   BMI 29.90 kg/m²     Physical Exam  Vitals and nursing note reviewed.   Constitutional:       General: She is not in acute distress.  HENT:      Head: Normocephalic and atraumatic.      Nose: Nose normal.      Mouth/Throat:      Mouth: Mucous membranes are moist.   Eyes:      General: No scleral icterus.  Cardiovascular:      " "Rate and Rhythm: Normal rate and regular rhythm.      Heart sounds: Normal heart sounds. No murmur heard.  Pulmonary:      Effort: No respiratory distress.      Breath sounds: Normal breath sounds.   Abdominal:      Palpations: Abdomen is soft.      Tenderness: There is no abdominal tenderness.   Musculoskeletal:         General: No tenderness. Normal range of motion.      Cervical back: Normal range of motion and neck supple.      Right lower leg: No edema.      Left lower leg: No edema.   Skin:     General: Skin is warm and dry.   Neurological:      Mental Status: She is alert.      Motor: Weakness (generalized) present.      Comments: Patient is awake but disoriented. She is at her neurologic baseline.   Psychiatric:         Behavior: Behavior normal.                  ED Course  /65   Pulse 58   Temp 97.4 °F (36.3 °C) (Oral)   Resp 20   Ht 162.6 cm (64\")   Wt 79 kg (174 lb 2.6 oz)   SpO2 90%   BMI 29.90 kg/m²   Results for orders placed or performed during the hospital encounter of 03/05/22   Comprehensive Metabolic Panel    Specimen: Arm, Left; Blood   Result Value Ref Range    Glucose 128 (H) 65 - 99 mg/dL    BUN 20 8 - 23 mg/dL    Creatinine 1.26 (H) 0.57 - 1.00 mg/dL    Sodium 137 136 - 145 mmol/L    Potassium 3.6 3.5 - 5.2 mmol/L    Chloride 99 98 - 107 mmol/L    CO2 26.5 22.0 - 29.0 mmol/L    Calcium 9.5 8.6 - 10.5 mg/dL    Total Protein 7.4 6.0 - 8.5 g/dL    Albumin 3.80 3.50 - 5.20 g/dL    ALT (SGPT) 7 1 - 33 U/L    AST (SGOT) 17 1 - 32 U/L    Alkaline Phosphatase 44 39 - 117 U/L    Total Bilirubin 0.2 0.0 - 1.2 mg/dL    Globulin 3.6 gm/dL    A/G Ratio 1.1 g/dL    BUN/Creatinine Ratio 15.9 7.0 - 25.0    Anion Gap 11.5 5.0 - 15.0 mmol/L    eGFR 43.5 (L) >60.0 mL/min/1.73   BNP    Specimen: Blood   Result Value Ref Range    proBNP 181.9 0.0 - 1,800.0 pg/mL   Troponin    Specimen: Arm, Left; Blood   Result Value Ref Range    Troponin T <0.010 0.000 - 0.030 ng/mL   CBC Auto Differential    " Specimen: Blood   Result Value Ref Range    WBC 13.62 (H) 3.40 - 10.80 10*3/mm3    RBC 3.60 (L) 3.77 - 5.28 10*6/mm3    Hemoglobin 11.8 (L) 12.0 - 15.9 g/dL    Hematocrit 35.8 34.0 - 46.6 %    MCV 99.4 (H) 79.0 - 97.0 fL    MCH 32.8 26.6 - 33.0 pg    MCHC 33.0 31.5 - 35.7 g/dL    RDW 13.8 12.3 - 15.4 %    RDW-SD 50.3 37.0 - 54.0 fl    MPV 11.6 6.0 - 12.0 fL    Platelets 261 140 - 450 10*3/mm3    Neutrophil % 72.9 42.7 - 76.0 %    Lymphocyte % 20.0 19.6 - 45.3 %    Monocyte % 5.1 5.0 - 12.0 %    Eosinophil % 1.0 0.3 - 6.2 %    Basophil % 0.3 0.0 - 1.5 %    Immature Grans % 0.7 (H) 0.0 - 0.5 %    Neutrophils, Absolute 9.92 (H) 1.70 - 7.00 10*3/mm3    Lymphocytes, Absolute 2.72 0.70 - 3.10 10*3/mm3    Monocytes, Absolute 0.70 0.10 - 0.90 10*3/mm3    Eosinophils, Absolute 0.14 0.00 - 0.40 10*3/mm3    Basophils, Absolute 0.04 0.00 - 0.20 10*3/mm3    Immature Grans, Absolute 0.10 (H) 0.00 - 0.05 10*3/mm3    nRBC 0.0 0.0 - 0.2 /100 WBC   Blood Gas, Arterial -With Co-Ox Panel: Yes    Specimen: Arm, Right; Arterial Blood   Result Value Ref Range    pH, Arterial 7.422 7.350 - 7.450 pH units    pCO2, Arterial 40.8 35.0 - 45.0 mm Hg    pO2, Arterial 54.8 (L) 80.0 - 100.0 mm Hg    HCO3, Arterial 26.0 22.0 - 26.0 mmol/L    Base Excess, Arterial 1.4 -2.0 - 2.0 mmol/L    O2 Saturation, Arterial 90.8 (L) 95.0 - 99.0 %    Hemoglobin, Blood Gas 12.3 11.7 - 14.6 g/dL    Carboxyhemoglobin 0.1 0 - 1.5 %    Methemoglobin 0.30 0.00 - 1.50 %    Oxyhemoglobin 90.4 (L) 94 - 99 %    FHHB 9.2 (H) 0.0 - 5.0 %    Site Arterial: right radial     Modality Room Air     FIO2 21 %    PO2/FIO2 261 0 - 500    Johnny's Test Positive     Lactate, Arterial     POC Glucose Once    Specimen: Blood   Result Value Ref Range    Glucose 132 (H) 70 - 99 mg/dL   ECG 12 Lead   Result Value Ref Range    QT Interval 442 ms   Green Top (Gel)   Result Value Ref Range    Extra Tube Hold for add-ons.    Lavender Top   Result Value Ref Range    Extra Tube hold for add-on   "  Gold Top - SST   Result Value Ref Range    Extra Tube Hold for add-ons.    Light Blue Top   Result Value Ref Range    Extra Tube hold for add-on      Medications - No data to display  XR Chest 1 View    Result Date: 3/5/2022  PROCEDURE: XR CHEST 1 VW  COMPARISON: [facility], CR, CHEST AP/PA 1 VIEW, 4/11/2021, 9:25.  INDICATIONS: SOA Triage Protocol  FINDINGS:   shunt catheter tubing traverses the right-side of the chest. There is no pneumothorax, pleural effusion or focal airspace consolidation. The heart size and pulmonary vasculature appear within normal limits. There are no acute osseous abnormalities.       No acute cardiopulmonary abnormality.       VARGAS LABOY MD       Electronically Signed and Approved By: VARGAS LABOY MD on 3/05/2022 at 20:06                  Procedures/EKGs:  Procedures     EKG:    Rhythm: Sinus bradycardia.  Rate: 58.  Normal P waves.  Normal KS interval.  Normal QRS.  Normal axis.  Non-specific ST changes.  Normal QT/QTc.      EKG Comparison: N/A    Interpreted by me       Medical Decision Making:                     MDM  Number of Diagnoses or Management Options  Dyspnea, unspecified type  Diagnosis management comments: The patient is at her baseline and her son confirmed to nurse that she \"looks good\" and is at her baseline.  She is not hypoxic on pulse oximeter and her ancillary studies are unremarkable.       Amount and/or Complexity of Data Reviewed  Clinical lab tests: reviewed  Tests in the radiology section of CPT®: reviewed  Tests in the medicine section of CPT®: reviewed  Decide to obtain previous medical records or to obtain history from someone other than the patient: yes  Obtain history from someone other than the patient: yes  Review and summarize past medical records: yes  Independent visualization of images, tracings, or specimens: yes         Final diagnoses:   Dyspnea, unspecified type        Disposition:  ED Disposition     ED Disposition "   Discharge    Condition   Stable    Comment   --             Documentation assistance provided by Janie David acting as scribe for Dr. Ronald Griffin. Information recorded by the scribe was done at my direction and has been verified and validated by me.      Janie David  03/05/22 [id number]       Ronald Griffin DO  03/06/22 0905

## 2022-03-06 NOTE — DISCHARGE INSTRUCTIONS
Continue current medications.  Return if symptoms return or persist.  Follow-up with your doctor tomorrow.

## 2022-04-30 ENCOUNTER — APPOINTMENT (OUTPATIENT)
Dept: GENERAL RADIOLOGY | Facility: HOSPITAL | Age: 82
End: 2022-04-30

## 2022-04-30 ENCOUNTER — HOSPITAL ENCOUNTER (INPATIENT)
Facility: HOSPITAL | Age: 82
LOS: 4 days | Discharge: NURSING, REHAB OR LONG-TERM CARE | End: 2022-05-04
Attending: EMERGENCY MEDICINE | Admitting: INTERNAL MEDICINE

## 2022-04-30 DIAGNOSIS — R26.2 DIFFICULTY WALKING: ICD-10-CM

## 2022-04-30 DIAGNOSIS — A41.9 SEPSIS, DUE TO UNSPECIFIED ORGANISM, UNSPECIFIED WHETHER ACUTE ORGAN DYSFUNCTION PRESENT: Primary | ICD-10-CM

## 2022-04-30 DIAGNOSIS — R13.12 OROPHARYNGEAL DYSPHAGIA: ICD-10-CM

## 2022-04-30 DIAGNOSIS — Z78.9 DECREASED ACTIVITIES OF DAILY LIVING (ADL): ICD-10-CM

## 2022-04-30 LAB
ALBUMIN SERPL-MCNC: 4.2 G/DL (ref 3.5–5.2)
ALBUMIN/GLOB SERPL: 1.1 G/DL
ALP SERPL-CCNC: 39 U/L (ref 39–117)
ALT SERPL W P-5'-P-CCNC: 19 U/L (ref 1–33)
ANION GAP SERPL CALCULATED.3IONS-SCNC: 13.2 MMOL/L (ref 5–15)
AST SERPL-CCNC: 27 U/L (ref 1–32)
BACTERIA UR QL AUTO: ABNORMAL /HPF
BASOPHILS # BLD AUTO: 0.05 10*3/MM3 (ref 0–0.2)
BASOPHILS NFR BLD AUTO: 0.3 % (ref 0–1.5)
BILIRUB SERPL-MCNC: 0.4 MG/DL (ref 0–1.2)
BILIRUB UR QL STRIP: NEGATIVE
BUN SERPL-MCNC: 30 MG/DL (ref 8–23)
BUN/CREAT SERPL: 27.3 (ref 7–25)
CALCIUM SPEC-SCNC: 9.5 MG/DL (ref 8.6–10.5)
CHLORIDE SERPL-SCNC: 103 MMOL/L (ref 98–107)
CLARITY UR: CLEAR
CO2 SERPL-SCNC: 25.8 MMOL/L (ref 22–29)
COLOR UR: YELLOW
CREAT SERPL-MCNC: 1.1 MG/DL (ref 0.57–1)
D-LACTATE SERPL-SCNC: 2 MMOL/L (ref 0.5–2)
DEPRECATED RDW RBC AUTO: 54.9 FL (ref 37–54)
EGFRCR SERPLBLD CKD-EPI 2021: 51.2 ML/MIN/1.73
EOSINOPHIL # BLD AUTO: 0.01 10*3/MM3 (ref 0–0.4)
EOSINOPHIL NFR BLD AUTO: 0.1 % (ref 0.3–6.2)
ERYTHROCYTE [DISTWIDTH] IN BLOOD BY AUTOMATED COUNT: 15 % (ref 12.3–15.4)
FLUAV AG NPH QL: NEGATIVE
FLUBV AG NPH QL IA: NEGATIVE
GLOBULIN UR ELPH-MCNC: 3.9 GM/DL
GLUCOSE SERPL-MCNC: 124 MG/DL (ref 65–99)
GLUCOSE UR STRIP-MCNC: NEGATIVE MG/DL
HCT VFR BLD AUTO: 37.6 % (ref 34–46.6)
HGB BLD-MCNC: 12.2 G/DL (ref 12–15.9)
HGB UR QL STRIP.AUTO: NEGATIVE
HOLD SPECIMEN: NORMAL
HOLD SPECIMEN: NORMAL
HYALINE CASTS UR QL AUTO: ABNORMAL /LPF
IMM GRANULOCYTES # BLD AUTO: 0.11 10*3/MM3 (ref 0–0.05)
IMM GRANULOCYTES NFR BLD AUTO: 0.6 % (ref 0–0.5)
KETONES UR QL STRIP: ABNORMAL
LEUKOCYTE ESTERASE UR QL STRIP.AUTO: ABNORMAL
LYMPHOCYTES # BLD AUTO: 0.39 10*3/MM3 (ref 0.7–3.1)
LYMPHOCYTES NFR BLD AUTO: 2.1 % (ref 19.6–45.3)
MCH RBC QN AUTO: 32.4 PG (ref 26.6–33)
MCHC RBC AUTO-ENTMCNC: 32.4 G/DL (ref 31.5–35.7)
MCV RBC AUTO: 100 FL (ref 79–97)
MONOCYTES # BLD AUTO: 0.62 10*3/MM3 (ref 0.1–0.9)
MONOCYTES NFR BLD AUTO: 3.4 % (ref 5–12)
NEUTROPHILS NFR BLD AUTO: 17.01 10*3/MM3 (ref 1.7–7)
NEUTROPHILS NFR BLD AUTO: 93.5 % (ref 42.7–76)
NITRITE UR QL STRIP: NEGATIVE
NRBC BLD AUTO-RTO: 0 /100 WBC (ref 0–0.2)
NT-PROBNP SERPL-MCNC: 739.5 PG/ML (ref 0–1800)
PH UR STRIP.AUTO: 5.5 [PH] (ref 5–8)
PLATELET # BLD AUTO: 278 10*3/MM3 (ref 140–450)
PMV BLD AUTO: 11.9 FL (ref 6–12)
POTASSIUM SERPL-SCNC: 3.4 MMOL/L (ref 3.5–5.2)
PROCALCITONIN SERPL-MCNC: 0.1 NG/ML (ref 0–0.25)
PROT SERPL-MCNC: 8.1 G/DL (ref 6–8.5)
PROT UR QL STRIP: ABNORMAL
RBC # BLD AUTO: 3.76 10*6/MM3 (ref 3.77–5.28)
RBC # UR STRIP: ABNORMAL /HPF
REF LAB TEST METHOD: ABNORMAL
SARS-COV-2 RNA PNL SPEC NAA+PROBE: NOT DETECTED
SODIUM SERPL-SCNC: 142 MMOL/L (ref 136–145)
SP GR UR STRIP: >=1.03 (ref 1–1.03)
SQUAMOUS #/AREA URNS HPF: ABNORMAL /HPF
TROPONIN T SERPL-MCNC: <0.01 NG/ML (ref 0–0.03)
UROBILINOGEN UR QL STRIP: ABNORMAL
WBC # UR STRIP: ABNORMAL /HPF
WBC NRBC COR # BLD: 18.19 10*3/MM3 (ref 3.4–10.8)
WHOLE BLOOD HOLD SPECIMEN: NORMAL
WHOLE BLOOD HOLD SPECIMEN: NORMAL

## 2022-04-30 PROCEDURE — 84145 PROCALCITONIN (PCT): CPT | Performed by: INTERNAL MEDICINE

## 2022-04-30 PROCEDURE — 99223 1ST HOSP IP/OBS HIGH 75: CPT | Performed by: INTERNAL MEDICINE

## 2022-04-30 PROCEDURE — 81001 URINALYSIS AUTO W/SCOPE: CPT | Performed by: EMERGENCY MEDICINE

## 2022-04-30 PROCEDURE — 93010 ELECTROCARDIOGRAM REPORT: CPT | Performed by: SPECIALIST

## 2022-04-30 PROCEDURE — 83880 ASSAY OF NATRIURETIC PEPTIDE: CPT | Performed by: EMERGENCY MEDICINE

## 2022-04-30 PROCEDURE — 84484 ASSAY OF TROPONIN QUANT: CPT | Performed by: EMERGENCY MEDICINE

## 2022-04-30 PROCEDURE — P9612 CATHETERIZE FOR URINE SPEC: HCPCS

## 2022-04-30 PROCEDURE — 87086 URINE CULTURE/COLONY COUNT: CPT | Performed by: EMERGENCY MEDICINE

## 2022-04-30 PROCEDURE — 87804 INFLUENZA ASSAY W/OPTIC: CPT | Performed by: EMERGENCY MEDICINE

## 2022-04-30 PROCEDURE — 36415 COLL VENOUS BLD VENIPUNCTURE: CPT | Performed by: EMERGENCY MEDICINE

## 2022-04-30 PROCEDURE — 25010000002 MEROPENEM PER 100 MG: Performed by: EMERGENCY MEDICINE

## 2022-04-30 PROCEDURE — 85025 COMPLETE CBC W/AUTO DIFF WBC: CPT | Performed by: EMERGENCY MEDICINE

## 2022-04-30 PROCEDURE — 25010000002 HYALURONIDASE (HUMAN) 150 UNIT/ML SOLUTION 1 ML VIAL: Performed by: EMERGENCY MEDICINE

## 2022-04-30 PROCEDURE — 93005 ELECTROCARDIOGRAM TRACING: CPT | Performed by: EMERGENCY MEDICINE

## 2022-04-30 PROCEDURE — 94799 UNLISTED PULMONARY SVC/PX: CPT

## 2022-04-30 PROCEDURE — 80053 COMPREHEN METABOLIC PANEL: CPT | Performed by: EMERGENCY MEDICINE

## 2022-04-30 PROCEDURE — U0004 COV-19 TEST NON-CDC HGH THRU: HCPCS | Performed by: EMERGENCY MEDICINE

## 2022-04-30 PROCEDURE — 25010000002 VANCOMYCIN 5 G RECONSTITUTED SOLUTION: Performed by: EMERGENCY MEDICINE

## 2022-04-30 PROCEDURE — 99284 EMERGENCY DEPT VISIT MOD MDM: CPT

## 2022-04-30 PROCEDURE — 87040 BLOOD CULTURE FOR BACTERIA: CPT | Performed by: EMERGENCY MEDICINE

## 2022-04-30 PROCEDURE — 83605 ASSAY OF LACTIC ACID: CPT | Performed by: EMERGENCY MEDICINE

## 2022-04-30 PROCEDURE — 71045 X-RAY EXAM CHEST 1 VIEW: CPT

## 2022-04-30 RX ORDER — ACETAMINOPHEN 650 MG/1
650 SUPPOSITORY RECTAL EVERY 4 HOURS PRN
Status: DISCONTINUED | OUTPATIENT
Start: 2022-04-30 | End: 2022-05-04 | Stop reason: HOSPADM

## 2022-04-30 RX ORDER — ENOXAPARIN SODIUM 100 MG/ML
40 INJECTION SUBCUTANEOUS EVERY 24 HOURS
Status: DISCONTINUED | OUTPATIENT
Start: 2022-04-30 | End: 2022-05-04 | Stop reason: HOSPADM

## 2022-04-30 RX ORDER — SODIUM CHLORIDE 0.9 % (FLUSH) 0.9 %
10 SYRINGE (ML) INJECTION AS NEEDED
Status: DISCONTINUED | OUTPATIENT
Start: 2022-04-30 | End: 2022-04-30

## 2022-04-30 RX ORDER — LEVETIRACETAM 5 MG/ML
500 INJECTION INTRAVASCULAR EVERY 12 HOURS SCHEDULED
Status: DISCONTINUED | OUTPATIENT
Start: 2022-04-30 | End: 2022-05-02

## 2022-04-30 RX ORDER — ACETAMINOPHEN 325 MG/1
650 TABLET ORAL EVERY 4 HOURS PRN
Status: DISCONTINUED | OUTPATIENT
Start: 2022-04-30 | End: 2022-05-04 | Stop reason: HOSPADM

## 2022-04-30 RX ORDER — SODIUM CHLORIDE 0.9 % (FLUSH) 0.9 %
10 SYRINGE (ML) INJECTION EVERY 12 HOURS SCHEDULED
Status: DISCONTINUED | OUTPATIENT
Start: 2022-04-30 | End: 2022-05-04 | Stop reason: HOSPADM

## 2022-04-30 RX ORDER — LATANOPROST 50 UG/ML
1 SOLUTION/ DROPS OPHTHALMIC NIGHTLY
Status: DISCONTINUED | OUTPATIENT
Start: 2022-05-01 | End: 2022-05-04 | Stop reason: HOSPADM

## 2022-04-30 RX ORDER — ONDANSETRON 2 MG/ML
4 INJECTION INTRAMUSCULAR; INTRAVENOUS EVERY 6 HOURS PRN
Status: DISCONTINUED | OUTPATIENT
Start: 2022-04-30 | End: 2022-05-04 | Stop reason: HOSPADM

## 2022-04-30 RX ORDER — SODIUM CHLORIDE 0.9 % (FLUSH) 0.9 %
10 SYRINGE (ML) INJECTION AS NEEDED
Status: DISCONTINUED | OUTPATIENT
Start: 2022-04-30 | End: 2022-05-04 | Stop reason: HOSPADM

## 2022-04-30 RX ORDER — ACETAMINOPHEN 160 MG/5ML
650 SOLUTION ORAL EVERY 4 HOURS PRN
Status: DISCONTINUED | OUTPATIENT
Start: 2022-04-30 | End: 2022-05-04 | Stop reason: HOSPADM

## 2022-04-30 RX ORDER — ALBUTEROL SULFATE 2.5 MG/3ML
2.5 SOLUTION RESPIRATORY (INHALATION) EVERY 4 HOURS PRN
Status: DISCONTINUED | OUTPATIENT
Start: 2022-04-30 | End: 2022-05-04 | Stop reason: HOSPADM

## 2022-04-30 RX ADMIN — SODIUM CHLORIDE 500 ML: 9 INJECTION, SOLUTION INTRAVENOUS at 14:43

## 2022-04-30 RX ADMIN — Medication 1 G: at 15:30

## 2022-04-30 RX ADMIN — HYALURONIDASE (HUMAN RECOMBINANT) 150 UNITS: 150 INJECTION, SOLUTION SUBCUTANEOUS at 19:09

## 2022-04-30 RX ADMIN — Medication 1500 MG: at 16:24

## 2022-05-01 PROBLEM — G40.909 SEIZURE DISORDER (HCC): Chronic | Status: ACTIVE | Noted: 2022-05-01

## 2022-05-01 PROBLEM — E87.6 HYPOKALEMIA: Status: ACTIVE | Noted: 2022-05-01

## 2022-05-01 LAB
ALBUMIN SERPL-MCNC: 3.4 G/DL (ref 3.5–5.2)
ALBUMIN/GLOB SERPL: 1 G/DL
ALP SERPL-CCNC: 34 U/L (ref 39–117)
ALT SERPL W P-5'-P-CCNC: 17 U/L (ref 1–33)
ANION GAP SERPL CALCULATED.3IONS-SCNC: 9.8 MMOL/L (ref 5–15)
AST SERPL-CCNC: 22 U/L (ref 1–32)
BACTERIA SPEC AEROBE CULT: NO GROWTH
BASOPHILS # BLD AUTO: 0.02 10*3/MM3 (ref 0–0.2)
BASOPHILS NFR BLD AUTO: 0.3 % (ref 0–1.5)
BILIRUB SERPL-MCNC: 0.3 MG/DL (ref 0–1.2)
BUN SERPL-MCNC: 23 MG/DL (ref 8–23)
BUN/CREAT SERPL: 26.7 (ref 7–25)
CALCIUM SPEC-SCNC: 8.5 MG/DL (ref 8.6–10.5)
CHLORIDE SERPL-SCNC: 109 MMOL/L (ref 98–107)
CO2 SERPL-SCNC: 25.2 MMOL/L (ref 22–29)
CREAT SERPL-MCNC: 0.86 MG/DL (ref 0.57–1)
DEPRECATED RDW RBC AUTO: 54 FL (ref 37–54)
EGFRCR SERPLBLD CKD-EPI 2021: 68.8 ML/MIN/1.73
EOSINOPHIL # BLD AUTO: 0.21 10*3/MM3 (ref 0–0.4)
EOSINOPHIL NFR BLD AUTO: 2.8 % (ref 0.3–6.2)
ERYTHROCYTE [DISTWIDTH] IN BLOOD BY AUTOMATED COUNT: 14.6 % (ref 12.3–15.4)
GLOBULIN UR ELPH-MCNC: 3.4 GM/DL
GLUCOSE SERPL-MCNC: 94 MG/DL (ref 65–99)
HCT VFR BLD AUTO: 28.8 % (ref 34–46.6)
HGB BLD-MCNC: 9.4 G/DL (ref 12–15.9)
IMM GRANULOCYTES # BLD AUTO: 0.02 10*3/MM3 (ref 0–0.05)
IMM GRANULOCYTES NFR BLD AUTO: 0.3 % (ref 0–0.5)
LYMPHOCYTES # BLD AUTO: 1.42 10*3/MM3 (ref 0.7–3.1)
LYMPHOCYTES NFR BLD AUTO: 18.6 % (ref 19.6–45.3)
MAGNESIUM SERPL-MCNC: 2.1 MG/DL (ref 1.6–2.4)
MCH RBC QN AUTO: 32.9 PG (ref 26.6–33)
MCHC RBC AUTO-ENTMCNC: 32.6 G/DL (ref 31.5–35.7)
MCV RBC AUTO: 100.7 FL (ref 79–97)
MONOCYTES # BLD AUTO: 0.68 10*3/MM3 (ref 0.1–0.9)
MONOCYTES NFR BLD AUTO: 8.9 % (ref 5–12)
NEUTROPHILS NFR BLD AUTO: 5.27 10*3/MM3 (ref 1.7–7)
NEUTROPHILS NFR BLD AUTO: 69.1 % (ref 42.7–76)
NRBC BLD AUTO-RTO: 0 /100 WBC (ref 0–0.2)
PHOSPHATE SERPL-MCNC: 2.3 MG/DL (ref 2.5–4.5)
PLATELET # BLD AUTO: 238 10*3/MM3 (ref 140–450)
PMV BLD AUTO: 10.7 FL (ref 6–12)
POTASSIUM SERPL-SCNC: 3.1 MMOL/L (ref 3.5–5.2)
PROT SERPL-MCNC: 6.8 G/DL (ref 6–8.5)
QT INTERVAL: 338 MS
QT INTERVAL: 355 MS
RBC # BLD AUTO: 2.86 10*6/MM3 (ref 3.77–5.28)
SODIUM SERPL-SCNC: 144 MMOL/L (ref 136–145)
TSH SERPL DL<=0.05 MIU/L-ACNC: 1.25 UIU/ML (ref 0.27–4.2)
WBC NRBC COR # BLD: 7.62 10*3/MM3 (ref 3.4–10.8)

## 2022-05-01 PROCEDURE — 85025 COMPLETE CBC W/AUTO DIFF WBC: CPT | Performed by: INTERNAL MEDICINE

## 2022-05-01 PROCEDURE — 84100 ASSAY OF PHOSPHORUS: CPT | Performed by: INTERNAL MEDICINE

## 2022-05-01 PROCEDURE — 25010000002 ENOXAPARIN PER 10 MG: Performed by: INTERNAL MEDICINE

## 2022-05-01 PROCEDURE — 92610 EVALUATE SWALLOWING FUNCTION: CPT

## 2022-05-01 PROCEDURE — 25010000002 LEVETIRACETAM IN NACL 0.82% 500 MG/100ML SOLUTION: Performed by: INTERNAL MEDICINE

## 2022-05-01 PROCEDURE — 25010000002 VANCOMYCIN 5 G RECONSTITUTED SOLUTION: Performed by: INTERNAL MEDICINE

## 2022-05-01 PROCEDURE — 84443 ASSAY THYROID STIM HORMONE: CPT | Performed by: INTERNAL MEDICINE

## 2022-05-01 PROCEDURE — 25010000002 MEROPENEM PER 100 MG: Performed by: INTERNAL MEDICINE

## 2022-05-01 PROCEDURE — 83735 ASSAY OF MAGNESIUM: CPT | Performed by: INTERNAL MEDICINE

## 2022-05-01 PROCEDURE — 99233 SBSQ HOSP IP/OBS HIGH 50: CPT | Performed by: INTERNAL MEDICINE

## 2022-05-01 PROCEDURE — 80053 COMPREHEN METABOLIC PANEL: CPT | Performed by: INTERNAL MEDICINE

## 2022-05-01 RX ORDER — SODIUM CHLORIDE 9 MG/ML
125 INJECTION, SOLUTION INTRAVENOUS CONTINUOUS
Status: DISCONTINUED | OUTPATIENT
Start: 2022-05-01 | End: 2022-05-01

## 2022-05-01 RX ORDER — DEXTROSE, SODIUM CHLORIDE, AND POTASSIUM CHLORIDE 5; .45; .15 G/100ML; G/100ML; G/100ML
75 INJECTION INTRAVENOUS CONTINUOUS
Status: DISCONTINUED | OUTPATIENT
Start: 2022-05-01 | End: 2022-05-02

## 2022-05-01 RX ADMIN — ENOXAPARIN SODIUM 40 MG: 100 INJECTION SUBCUTANEOUS at 00:29

## 2022-05-01 RX ADMIN — MEROPENEM 1 G: 1 INJECTION, POWDER, FOR SOLUTION INTRAVENOUS at 15:12

## 2022-05-01 RX ADMIN — SODIUM CHLORIDE 125 ML/HR: 9 INJECTION, SOLUTION INTRAVENOUS at 05:02

## 2022-05-01 RX ADMIN — LEVETIRACETAM 500 MG: 5 INJECTION INTRAVASCULAR at 08:06

## 2022-05-01 RX ADMIN — LATANOPROST 1 DROP: 50 SOLUTION OPHTHALMIC at 20:46

## 2022-05-01 RX ADMIN — Medication 10 ML: at 08:06

## 2022-05-01 RX ADMIN — ENOXAPARIN SODIUM 40 MG: 100 INJECTION SUBCUTANEOUS at 20:46

## 2022-05-01 RX ADMIN — POTASSIUM CHLORIDE, DEXTROSE MONOHYDRATE AND SODIUM CHLORIDE 75 ML/HR: 150; 5; 450 INJECTION, SOLUTION INTRAVENOUS at 18:25

## 2022-05-01 RX ADMIN — LEVETIRACETAM 500 MG: 5 INJECTION INTRAVASCULAR at 20:46

## 2022-05-01 RX ADMIN — VANCOMYCIN HYDROCHLORIDE 1250 MG: 5 INJECTION, POWDER, LYOPHILIZED, FOR SOLUTION INTRAVENOUS at 18:25

## 2022-05-01 RX ADMIN — MEROPENEM 1 G: 1 INJECTION, POWDER, FOR SOLUTION INTRAVENOUS at 05:01

## 2022-05-01 RX ADMIN — LEVETIRACETAM 500 MG: 5 INJECTION INTRAVASCULAR at 00:29

## 2022-05-01 NOTE — PLAN OF CARE
Goal Outcome Evaluation:  Plan of Care Reviewed With: patient    ASSESSMENT/ PLAN OF CARE:  Pt presents with limitations, noted below, that impede patient's ability to swallow safely and maintain nutrition. The skills of a therapist will be required to safely and effectively implement the following treatment plan to restore maximal level of function.    PROBLEMS:  1.  Risk of aspiration, swallow delay, delayed initiation of swallow, decreased laryngeal elevation, history of silent aspiration  TREATMENT: Speech therapy for dysphagia, improvement in swallow function for tolerance of trials of p.o. intake.    FREQUENCY/DURATION: Daily, 5 days a week    REHAB POTENTIAL:  Pt has good/fair rehab potential.  The following limitations may influence improvement/ length of tx medical status, prior level of function.    RECOMMENDATIONS:   1.   DIET: Cannot recommend any safe p.o. intake at this time.  Recommend strict NPO with trials of p.o. with speech therapy only.  Recommend alternative feeding method as primary nutrition.    2.  Oral care per protocol/policy

## 2022-05-01 NOTE — H&P
[facility]   HISTORY AND PHYSICAL    Patient Name: Kezia Garcia  : 1940  MRN: [id number]  Primary Care Physician:  Laurent Blanco MD  Date of admission: 2022    Subjective   Subjective     Chief Complaint: Shortness of Breath    History of Present Illness    Review of Systems     Personal History     Past Medical History:   Diagnosis Date   • Anemia    • COPD (chronic obstructive pulmonary disease) (HCC)    • Coronary artery disease    • Dementia (HCC)    • Disease of thyroid gland    • Hyperkalemia    • Hyperlipidemia    • Hypertension    • Seizures (HCC)        History reviewed. No pertinent surgical history.    Family History: family history is not on file. Otherwise pertinent FHx was reviewed and not pertinent to current issue.    Social History:  reports that she has never smoked. She does not have any smokeless tobacco history on file. She reports previous alcohol use. She reports that she does not use drugs.    Home Medications:  Flax Seed Oil, Lidocaine HCl, Vitamin D3, acetaminophen, albuterol, azithromycin, donepezil, fenofibrate, guaiFENesin, hydroCHLOROthiazide, ketoconazole, latanoprost, levETIRAcetam, levothyroxine, loperamide, memantine, and sodium chloride    Allergies:  Allergies   Allergen Reactions   • Erythromycin Anaphylaxis   • Bacitracin Unknown - High Severity   • Cefazolin Unknown - High Severity   • Cephalosporins Unknown - High Severity   • Neomycin Unknown - High Severity   • Penicillins Unknown - High Severity   • Polymox [Amoxicillin] Unknown - High Severity   • Prednisone Unknown - High Severity   • Quinolones Unknown - High Severity   • Statins Unknown - High Severity   • Sulfa Antibiotics Unknown - High Severity   • Trimethoprim Unknown - High Severity       Objective    Objective     Vitals:   Temp:  [99.6 °F (37.6 °C)] 99.6 °F (37.6 °C)  Heart Rate:  [] 72  Resp:  [22] 22  BP: (103-130)/(41-80) 112/64  Flow (L/min):  [4] 4    Physical Exam    Result  Review    Result Review:  I have personally reviewed the results from the time of this admission to 4/30/2022 22:30 EDT and agree with these findings:  []  Laboratory  []  Microbiology  []  Radiology  []  EKG/Telemetry   []  Cardiology/Vascular   []  Pathology  []  Old records  []  Other:  Most notable findings include:     Assessment/Plan   Assessment / Plan     Brief Patient Summary:  Kezia Garcia is a 81 y.o. female who     Active Hospital Problems:  Active Hospital Problems    Diagnosis    • Sepsis, due to unspecified organism, unspecified whether acute organ dysfunction present (HCC)      Plan:       DVT prophylaxis:  Medical DVT prophylaxis orders are present.    CODE STATUS:    Code Status (Patient has no pulse and is not breathing): CPR (Attempt to Resuscitate)  Medical Interventions (Patient has pulse or is breathing): Full Support    Admission Status:  I believe this patient meets Inpatient status.    Paul Monroy MD

## 2022-05-01 NOTE — H&P
"[facility]   HISTORY AND PHYSICAL    Patient Name: Kezia Garcia  : 1940  MRN: [id number]  Primary Care Physician:  Laurent Blanco MD  Date of admission: 2022    Subjective   Subjective     Chief Complaint: Difficulty handling secretions    History of Present Illness  81-year-old woman with a history of CVA, with  shunt and seizure history, dementia COPD, CAD, hypothyroidism, HLD, and HTN presents from nursing home with report of difficulty breathing with gurgling sound.  Given the patient's mental status/communication ability, any further history had to come from sheets sent from the nursing home.    Note: The patient presented to this ED on 3/6/2022 with a complaint of shortness of breath also; she was discharged back to the nursing home from the ED.    Per the sheets from the nursing home:  -CODE STATUS: Full code  -Diet: Regular; purée/honey thick liquids  -Extensive allergy list  There is no mention of her baseline mental status other than a note for \"cognitive communication deficit\".  As the facility sent her in with a complaint of shortness of breath and gurgling with no comment of a change in mental status, she is assumed to be at her baseline mental status.    In the ED today, patient seemed to improve was with suctioning.  However, she was found to be septic with tachycardia, tachypnea, and WBCs of 18.2.  She also had a temperature of 99.6.  Lactic was normal.    Chest x-ray showed no active pulmonary disease; shows precordial loop recorder and shunt tubing.  Influenza a and B: Negative  COVID: Not detected    CT head from 2022:  2. Stable chronic findings, including chronic bilateral subdural hematomas/hygromas, changes of   bilateral craniotomy, right temporal lobe encephalomalacia, ventricular shunt catheter tip   terminating in the left frontal lobe.  3. Decreased left maxillary sinus disease and new right maxillary sinus disease.    Due to her signs of sepsis and " concern for aspiration with gurgling and shortness of breath despite clear chest x-ray, the ED started:  -Meropenem (due to multiple allergies)  -Vancomycin (which extravasated into her arm)   -New IV was placed  -Due to some apparent miscommunication, the ED did not initiate sepsis-level IVF resuscitation       Review of Systems   Impossible given her communication issues.  Personal History     Past Medical History:   Diagnosis Date   • Anemia    • COPD (chronic obstructive pulmonary disease) ([facility])    • Coronary artery disease    • Dementia (HCC)    • Disease of thyroid gland    • Hyperkalemia    • Hyperlipidemia    • Hypertension    • Seizures ([facility])        History reviewed. No pertinent surgical history.    Family History: family history is not on file. Otherwise pertinent FHx was reviewed and not pertinent to current issue.    Social History:  reports that she has never smoked. She does not have any smokeless tobacco history on file. She reports previous alcohol use. She reports that she does not use drugs.    Home Medications:  Flax Seed Oil, Lidocaine HCl, Vitamin D3, acetaminophen, albuterol, azithromycin, donepezil, fenofibrate, guaiFENesin, hydroCHLOROthiazide, ketoconazole, latanoprost, levETIRAcetam, levothyroxine, loperamide, memantine, and sodium chloride    Allergies:  Allergies   Allergen Reactions   • Erythromycin Anaphylaxis   • Bacitracin Unknown - High Severity   • Cefazolin Unknown - High Severity   • Cephalosporins Unknown - High Severity   • Neomycin Unknown - High Severity   • Penicillins Unknown - High Severity   • Polymox [Amoxicillin] Unknown - High Severity   • Prednisone Unknown - High Severity   • Quinolones Unknown - High Severity   • Statins Unknown - High Severity   • Sulfa Antibiotics Unknown - High Severity   • Trimethoprim Unknown - High Severity       Objective    Objective     Vitals:   Temp:  [99.6 °F (37.6 °C)] 99.6 °F (37.6 °C)  Heart Rate:  [] 85  Resp:  [22] 22  BP:  (130)/(80) 130/80  Flow (L/min):  [4] 4    Physical Exam  Constitutional:       Appearance: Has a look of someone who chronically resides in bed.  She is obese.   HENT:      Head: Normocephalic and atraumatic.      Nose: Nose normal.      Mouth/Throat:      Mouth: Mucous membranes are markedly dry.   Eyes:      Extraocular Movements: Extraocular movements intact.      Pupils: Pupils are equal, round, and reactive to light.   Cardiovascular:      Rate and Rhythm: Regular rate and rhythm.     Heart sounds: Normal heart sounds.   Pulmonary:      Effort: Pulmonary effort is normal. No respiratory distress.      Comments: Only slight gurgling at this time (status-post suctioning). Coarse bilateral breath sounds.  Lung exam is generally benign.  Abdominal:      General: Bowel sounds are normal.      Palpations: Abdomen is soft.      Tenderness: There is no abdominal tenderness.   Musculoskeletal:         General: No swelling. Normal range of motion.      Cervical back: Normal range of motion and neck supple.   Skin:     General: Skin is warm and dry.      Coloration: Skin is not jaundiced.   Neurological:      General: No focal deficit present.      Mental Status: She is alert.  She does track you with her eyes.  And she does very directed, deliberate movements when trying to cover her self back up with a blanket.  But she does seem to understand your examiner and allows it to happen and only when it is over then move the blanket.     Comments: Nonverbal   Psychiatric:         Behavior: She is not agitated.       Result Review    Result Review:  I have personally reviewed the results from the time of this admission to 4/30/2022 22:28 EDT and agree with these findings:  [x]  Laboratory  []  Microbiology  [x]  Radiology  [x]  EKG/Telemetry   []  Cardiology/Vascular   []  Pathology  [x]  Old records  []  Other:  Most notable findings include: WBC 18.2; no evidence of pneumonia on CXR    Assessment/Plan   Assessment / Plan      Brief Patient Summary:  81-year-old woman with a history of CVA, with  shunt and seizure history, dementia COPD, CAD, hypothyroidism, HLD, and HTN presents from nursing home with report of difficulty breathing with gurgling sound.  Found to meet SIRS criteria with concern for aspiration pneumonia.    Active Hospital Problems:  Active Hospital Problems    Diagnosis    • Sepsis, due to unspecified organism, unspecified whether acute organ dysfunction present (HCC)      Plan:   Shortness of breath with gurgling  Concern for aspiration  SIRS criteria  -Vancomycin and meropenem (due to significant allergy list)  -IV fluids  -Suction as needed  -Swallow/SLP evaluation    Chronic issues: HOLDING ALL PO MEDS FOR NOW  -Seizure history: Switch Keppra to IV  -COPD: Continue albuterol  -Hypothyroid: If n.p.o. status continues, can switch to IV Synthroid  -Holding vitamins, Aricept, memantine, fenofibrate, hydrochlorothiazide,    Diet: N.p.o. until passes speech eval  DVT prophylaxis: Lovenox 40 mg daily  Medical DVT prophylaxis orders are present.    CODE STATUS: Noted in the sheets from the facility: Full code  Code Status (Patient has no pulse and is not breathing): CPR (Attempt to Resuscitate)  Medical Interventions (Patient has pulse or is breathing): Full Support    Admission Status:  I believe this patient meets Inpatient status.    Paul Monroy MD

## 2022-05-01 NOTE — PROGRESS NOTES
[facility]   Hospitalist Progress Note  Date: 2022  Patient Name: Kezia Garcia  : 1940  MRN: [id number]  Date of admission: 2022      Subjective   Subjective     Chief Complaint: Follow up for difficulty breathing    Summary: 82 y/o F with history of CVA, with  shunt and seizure history, dementia COPD, CAD, hypothyroidism, HLD, and HTN presents from nursing home with report of difficulty breathing. Met sepsis criteria. WBC 18.2. CXR no active disease. Influenza and COVID negative.  Concern for aspiration, started on vancomycin and meropenem given allergy profile.    Interval Followup: No fevers.  Hemodynamically stable.  Requiring 4 L/min oxygen.  Failed swallow evaluation.  Strict NPO.  Remains on normal saline.  No hypoglycemia potassium and phosphorus low.  Creatinine stable.  Leukocytosis resolved.    Review of Systems  Unable to obtain due to dementia/nonverbal    Objective   Objective     Vitals:   Temp:  [98 °F (36.7 °C)-99.6 °F (37.6 °C)] 98 °F (36.7 °C)  Heart Rate:  [] 62  Resp:  [20-22] 20  BP: ()/(41-80) 135/50  Flow (L/min):  [4-5] 4  Physical Exam    Constitutional: Elderly  female, chronically ill appearing, awake in bed, nonconversant, appears comfortable no grimacing   Eyes: Pupils equal and reactive, no conjunctival injection   HENT: NCAT, nares patent, dry mucous membrane   Neck: Supple, trachea midline   Respiratory: Audible gurgling sounds, equal aeration with bilateral rhonchi, nonlabored respiration   Cardiovascular: RRR, no murmurs, no pedal edema   Gastrointestinal: Positive bowel sounds, soft, nondistended   Musculoskeletal: No gross joint deformities, no clubbing or cyanosis to extremities   Neurologic: Nonverbal, alert, seems to track movements appropriately, no obvious focal deficit, no facial droop   Skin: Warm and dry, no rashes, no ecchymoses    Result Review    Result Review:  I have personally reviewed the results from the time of this  admission to 5/1/2022 07:38 EDT and agree with these findings:  [x]  Laboratory   CBC    CBC 3/5/22 4/30/22 5/1/22   WBC 13.62 (A) 18.19 (A) 7.62   RBC 3.60 (A) 3.76 (A) 2.86 (A)   Hemoglobin 11.8 (A) 12.2 9.4 (A)   Hematocrit 35.8 37.6 28.8 (A)   MCV 99.4 (A) 100.0 (A) 100.7 (A)   MCH 32.8 32.4 32.9   MCHC 33.0 32.4 32.6   RDW 13.8 15.0 14.6   Platelets 261 278 238   (A) Abnormal value            BMP    BMP 3/5/22 4/30/22 5/1/22   BUN 20 30 (A) 23   Creatinine 1.26 (A) 1.10 (A) 0.86   Sodium 137 142 144   Potassium 3.6 3.4 (A) 3.1 (A)   Chloride 99 103 109 (A)   CO2 26.5 25.8 25.2   Calcium 9.5 9.5 8.5 (A)   (A) Abnormal value       Comments are available for some flowsheets but are not being displayed.             [x]  Microbiology  COVID-negative  Rapid influenza antigen negative  Blood cultures NGTD  Urine culture no growth  [x]  Radiology   XR Chest 1 View    Result Date: 4/30/2022  PROCEDURE: XR CHEST 1 VW  COMPARISON: [facility], CR, XR CHEST 1 VW, 3/05/2022, 19:44.  INDICATIONS: SOA Triage Protocol  FINDINGS:  The heart is normal in size.  The lungs are well-expanded and free of infiltrates.   Precordial loop recorder is evident.  Shunt tubing is seen over the right hemithorax.  Bony structures have an osteopenic appearance.       No active cardiopulmonary disease is seen.       TEE TRONCOSO MD       Electronically Signed and Approved By: TEE TRONCOSO MD on 4/30/2022 at 15:02             []  EKG/Telemetry   []  Cardiology/Vascular   []  Pathology  []  Old records  []  Other:    Assessment/Plan   Assessment / Plan     Assessment/Plan:  Active Hospital Problems    Diagnosis  POA   • **Sepsis, due to unspecified organism, unspecified whether acute organ dysfunction present (HCC) [A41.9]  Yes   • Seizure disorder (HCC) [G40.909]  Yes   • Hypokalemia [E87.6]  Unknown   • COPD (chronic obstructive pulmonary disease) (HCC) [J44.9]  Unknown   • Hypothyroidism [E03.9]  Yes   • Dementia (HCC)  [F03.90]  Yes   • Difficulty with speech [R47.9]  Yes   • Hypertension [I10]  Yes   • History of CVA (cerebrovascular accident) [Z86.73]  Not Applicable   • Hypophosphatemia [E83.39]  Unknown         Evaluated by SLP -> not safe for oral nutrition at this time.  Keep strict n.p.o..  Following reevaluation by speech tomorrow, if no improvement will need to place core track tube.    Stop NS and switch to D5 1/2 NS at 20 mEq KCl at 75 cc/h    Procalcitonin 0.10.  White blood cell count has normalized. No fevers  Cultures negative thus far ->  Continue IV vancomycin and meropenem.  Pharmacy to dose and monitor vancomycin levels via AUC method    Wean supplemental oxygen, SPO2 goal >90%  DuoNebs 4 times a day scheduled  Bronchopulmonary hygiene protocol  Nasotracheal suctioning as needed per RT    Continue IV Keppra 500 mg every 12 hours.  Aspiration precaution    Per med rec, is on a high dose of Synthroid given age and weight.  Last TSH in our system was back in 2019 and was significantly elevated.  Will recheck TSH with reflex T4.  Hold Synthroid for now    Continue Lovenox 40 mg q. daily for DVT ppx    Given age, comorbidities, functional status will consult palliative care to help address goals of care with POA.    Discussed plan with RN.    DVT prophylaxis:  Medical DVT prophylaxis orders are present.    CODE STATUS:   Code Status (Patient has no pulse and is not breathing): CPR (Attempt to Resuscitate)  Medical Interventions (Patient has pulse or is breathing): Full Support    Electronically signed by Tariq Hernandes DO, 05/01/22, 7:38 AM EDT.

## 2022-05-01 NOTE — PLAN OF CARE
Goal Outcome Evaluation:         Patient rested through the night with no distress, patient is confused and comes from a nursing home.  Daughter at bedside in beginning of shift.  Antibiotics and fluids started for patient. Patient currently NPO  until study is completed. Continuing to monitor patient.

## 2022-05-01 NOTE — THERAPY EVALUATION
Acute Care - Speech Language Pathology   Swallow Initial Evaluation [facility]     Patient Name: Kezia Garcia  : 1940  MRN: [id number]  Today's Date: 2022               Admit Date: 2022    Visit Dx:     ICD-10-CM ICD-9-CM   1. Sepsis, due to unspecified organism, unspecified whether acute organ dysfunction present (HCC)  A41.9 038.9     995.91   2. Oropharyngeal dysphagia  R13.12 787.22     Patient Active Problem List   Diagnosis   • Sepsis, due to unspecified organism, unspecified whether acute organ dysfunction present (HCC)   • Seizure disorder (HCC)   • Hypokalemia   • COPD (chronic obstructive pulmonary disease) (HCC)   • Hypothyroidism     Past Medical History:   Diagnosis Date   • Anemia    • COPD (chronic obstructive pulmonary disease) (HCC)    • Coronary artery disease    • Dementia (HCC)    • Disease of thyroid gland    • Hyperkalemia    • Hyperlipidemia    • Hypertension    • Seizures (HCC)      History reviewed. No pertinent surgical history.      Inpatient Speech Pathology Dysphagia Evaluation        PAIN SCALE: None indicated    PRECAUTIONS/CONTRAINDICATIONS: Standard, fall, aspiration    SUSPECTED ABUSE/NEGLECT/EXPLOITATION: None identified    SOCIAL/PSYCHOLOGICAL NEEDS/BARRIERS: None identified    PAST SOCIAL HISTORY: 81-year-old female, nursing home resident    PRIOR FUNCTION: Previous dysphagia, reportedly on honey thick liquids and purée solids at the nursing home    PATIENT GOALS/EXPECTATIONS: Did not state    HISTORY: 81-year-old female referred for speech pathology dysphagia evaluation due to suspicion of aspiration.  Patient with longstanding dysphagia following CVA, most recent diet with honey thick liquids and purée solids.  Patient received a modified barium swallow study [facility] in 2021 which revealed silent aspiration of nectar thick liquids and cup drink of honey thickened liquids.  Did not reveal any aspiration with spoon fed honey thick.  Patient  presents with reported sepsis, congested/wet breath sounds concerning for aspiration.  Currently NPO with ice chips.    CURRENT DIET LEVEL: N.p.o.    OBJECTIVE:    TEST ADMINISTERED: Clinical dysphagia evaluation    COGNITION/SAFETY AWARENESS: Impaired    BEHAVIORAL OBSERVATIONS: Opens eyes, smiles, does not follow any direct/structured verbal commands    ORAL MOTOR EXAM: Without dentition.  Dry oral mucosa    VOICE QUALITY: N/A    REFLEX EXAM: Wet, congested    POSTURE: Total assistance    FEEDING/SWALLOWING FUNCTION: Assessed with ice chips and honey thick liquid by spoon only    CLINICAL OBSERVATIONS: Oral care provided prior to evaluation.  Upper airway wetness observed.  Did not observe any laryngeal wetness to auscultation prior to evaluation.  Single ice chip with patient taking via spoon.  Moderate lingual pumping.  Swallow delay completed however greater than 30 second delay.  Honey thick liquid by 1/4 teaspoon bolus size.  Moderate lingual pumping with swallow delay greater than 1 minute.  Poor laryngeal elevation noted at 3/5.  Laryngeal wetness, delayed wet congested cough.    DYSPHAGIA CRITERIA: History of dysphagia and aspiration, had previously been requiring altered diet of honey thick liquids and purée solids.  Patient is at high risk of aspiration.    FUNCTIONAL ASSESSMENT INSTRUMENT: Patient currently scored a level 1 of 7 on Functional Communication Measures for swallowing indicating a 100% limitation in function.    ASSESSMENT/ PLAN OF CARE:  Pt presents with limitations, noted below, that impede patient's ability to swallow safely and maintain nutrition. The skills of a therapist will be required to safely and effectively implement the following treatment plan to restore maximal level of function.    PROBLEMS:  1.  Risk of aspiration, swallow delay, delayed initiation of swallow, decreased laryngeal elevation, history of silent aspiration   LTG 1: 30 days.  Patient will increase functional  communication measures for swallowing to level 2 of 7, indicating an 80-99% limitation in function.   STG 1a: 14 days.  Patient will increase swallow function for tolerance of trials of spoonfed honey thick liquids with minimal to no signs or symptoms of aspiration of 50% of trials.   STG 1b: 14 days.  Patient will increase swallow function, producing swallow with max 20 second delay.   STG 1c: 14 days.  Patient will increase swallow function for tolerance of trials of purée solids with minimal to no signs or symptoms aspiration of 50% of trials.   TREATMENT: Speech therapy for dysphagia, improvement in swallow function for tolerance of trials of p.o. intake.    FREQUENCY/DURATION: Daily, 5 days a week    REHAB POTENTIAL:  Pt has good/fair rehab potential.  The following limitations may influence improvement/ length of tx medical status, prior level of function.    RECOMMENDATIONS:   1.   DIET: Cannot recommend any safe p.o. intake at this time.  Recommend strict NPO with trials of p.o. with speech therapy only.  Recommend alternative feeding method as primary nutrition.    2.  Oral care per protocol/policy      Pt/responsible party agrees with plan of care and has been informed of all alternatives, risks and benefits.  SLP Recommendation and Plan                          Anticipated Discharge Disposition (SLP): extended care facility (05/01/22 0910)                                                      EDUCATION  The patient has been educated in the following areas:   Dysphagia (Swallowing Impairment).              Time Calculation:    Time Calculation- SLP     Row Name 05/01/22 0911             Time Calculation- SLP    SLP Start Time 0800  -SN      SLP Stop Time 0900  -SN      SLP Time Calculation (min) 60 min  -SN      SLP Received On 05/01/22  -SN              Untimed Charges    67538-JT Eval Oral Pharyng Swallow Minutes 60  -SN              Total Minutes    Untimed Charges Total Minutes 60  -SN       Total  Minutes 60  -SN            User Key  (r) = Recorded By, (t) = Taken By, (c) = Cosigned By    Initials Name Provider Type    Patricia Segundo SLP Speech and Language Pathologist                Therapy Charges for Today     Code Description Service Date Service Provider Modifiers Qty    [id number]  ST EVAL ORAL PHARYNG SWALLOW 4 5/1/2022 Patricia Herr SLP GN 1               LAILA Overton  5/1/2022

## 2022-05-01 NOTE — PLAN OF CARE
Goal Outcome Evaluation:  Plan of Care Reviewed With: daughter, patient        Progress: no change    VSS.  Nonverbal, [place] able to read lips. Pt remains on 4L NC. Very congested throughout with very weak cough. Pt is NPO due to aspiration risk. Possible core track placement in the near future. D5 1/2 NS with 20 meq potassium running at 75ml/hr. Vanco currently infusing. Daughter at bedside.

## 2022-05-02 ENCOUNTER — APPOINTMENT (OUTPATIENT)
Dept: GENERAL RADIOLOGY | Facility: HOSPITAL | Age: 82
End: 2022-05-02

## 2022-05-02 LAB
ANION GAP SERPL CALCULATED.3IONS-SCNC: 7 MMOL/L (ref 5–15)
BUN SERPL-MCNC: 19 MG/DL (ref 8–23)
BUN/CREAT SERPL: 24.7 (ref 7–25)
CALCIUM SPEC-SCNC: 8.3 MG/DL (ref 8.6–10.5)
CHLORIDE SERPL-SCNC: 110 MMOL/L (ref 98–107)
CO2 SERPL-SCNC: 25 MMOL/L (ref 22–29)
CREAT SERPL-MCNC: 0.77 MG/DL (ref 0.57–1)
DEPRECATED RDW RBC AUTO: 53.2 FL (ref 37–54)
EGFRCR SERPLBLD CKD-EPI 2021: 78.6 ML/MIN/1.73
ERYTHROCYTE [DISTWIDTH] IN BLOOD BY AUTOMATED COUNT: 14.3 % (ref 12.3–15.4)
GLUCOSE SERPL-MCNC: 107 MG/DL (ref 65–99)
HCT VFR BLD AUTO: 30.6 % (ref 34–46.6)
HGB BLD-MCNC: 9.6 G/DL (ref 12–15.9)
MCH RBC QN AUTO: 31.7 PG (ref 26.6–33)
MCHC RBC AUTO-ENTMCNC: 31.4 G/DL (ref 31.5–35.7)
MCV RBC AUTO: 101 FL (ref 79–97)
MRSA DNA SPEC QL NAA+PROBE: ABNORMAL
PLATELET # BLD AUTO: 238 10*3/MM3 (ref 140–450)
PMV BLD AUTO: 11.1 FL (ref 6–12)
POTASSIUM SERPL-SCNC: 3.4 MMOL/L (ref 3.5–5.2)
RBC # BLD AUTO: 3.03 10*6/MM3 (ref 3.77–5.28)
SODIUM SERPL-SCNC: 142 MMOL/L (ref 136–145)
WBC NRBC COR # BLD: 5.23 10*3/MM3 (ref 3.4–10.8)

## 2022-05-02 PROCEDURE — 92611 MOTION FLUOROSCOPY/SWALLOW: CPT

## 2022-05-02 PROCEDURE — 97161 PT EVAL LOW COMPLEX 20 MIN: CPT

## 2022-05-02 PROCEDURE — 97165 OT EVAL LOW COMPLEX 30 MIN: CPT

## 2022-05-02 PROCEDURE — 85027 COMPLETE CBC AUTOMATED: CPT | Performed by: INTERNAL MEDICINE

## 2022-05-02 PROCEDURE — 25010000002 MEROPENEM PER 100 MG: Performed by: INTERNAL MEDICINE

## 2022-05-02 PROCEDURE — 87641 MR-STAPH DNA AMP PROBE: CPT | Performed by: INTERNAL MEDICINE

## 2022-05-02 PROCEDURE — 80048 BASIC METABOLIC PNL TOTAL CA: CPT | Performed by: INTERNAL MEDICINE

## 2022-05-02 PROCEDURE — 99233 SBSQ HOSP IP/OBS HIGH 50: CPT | Performed by: INTERNAL MEDICINE

## 2022-05-02 PROCEDURE — 25010000002 ENOXAPARIN PER 10 MG: Performed by: INTERNAL MEDICINE

## 2022-05-02 PROCEDURE — 25010000002 LEVETIRACETAM IN NACL 0.82% 500 MG/100ML SOLUTION: Performed by: INTERNAL MEDICINE

## 2022-05-02 PROCEDURE — 25010000002 VANCOMYCIN 5 G RECONSTITUTED SOLUTION: Performed by: INTERNAL MEDICINE

## 2022-05-02 PROCEDURE — 74230 X-RAY XM SWLNG FUNCJ C+: CPT

## 2022-05-02 PROCEDURE — 92526 ORAL FUNCTION THERAPY: CPT

## 2022-05-02 RX ORDER — DONEPEZIL HYDROCHLORIDE 10 MG/1
10 TABLET, FILM COATED ORAL NIGHTLY
Status: DISCONTINUED | OUTPATIENT
Start: 2022-05-02 | End: 2022-05-04 | Stop reason: HOSPADM

## 2022-05-02 RX ORDER — LEVOTHYROXINE SODIUM 0.15 MG/1
300 TABLET ORAL
Status: DISCONTINUED | OUTPATIENT
Start: 2022-05-03 | End: 2022-05-04 | Stop reason: HOSPADM

## 2022-05-02 RX ORDER — HYDROCHLOROTHIAZIDE 25 MG/1
25 TABLET ORAL DAILY
Status: DISCONTINUED | OUTPATIENT
Start: 2022-05-03 | End: 2022-05-02

## 2022-05-02 RX ORDER — LEVETIRACETAM 500 MG/1
500 TABLET ORAL EVERY 12 HOURS SCHEDULED
Status: DISCONTINUED | OUTPATIENT
Start: 2022-05-02 | End: 2022-05-04 | Stop reason: HOSPADM

## 2022-05-02 RX ORDER — POTASSIUM CHLORIDE 750 MG/1
20 CAPSULE, EXTENDED RELEASE ORAL DAILY
Status: DISCONTINUED | OUTPATIENT
Start: 2022-05-03 | End: 2022-05-03

## 2022-05-02 RX ORDER — MEMANTINE HYDROCHLORIDE 10 MG/1
10 TABLET ORAL 2 TIMES DAILY
Status: DISCONTINUED | OUTPATIENT
Start: 2022-05-02 | End: 2022-05-04 | Stop reason: HOSPADM

## 2022-05-02 RX ORDER — GUAIFENESIN 600 MG/1
600 TABLET, EXTENDED RELEASE ORAL 2 TIMES DAILY
Status: DISCONTINUED | OUTPATIENT
Start: 2022-05-02 | End: 2022-05-04 | Stop reason: HOSPADM

## 2022-05-02 RX ADMIN — Medication 10 ML: at 22:15

## 2022-05-02 RX ADMIN — POTASSIUM CHLORIDE, DEXTROSE MONOHYDRATE AND SODIUM CHLORIDE 75 ML/HR: 150; 5; 450 INJECTION, SOLUTION INTRAVENOUS at 04:52

## 2022-05-02 RX ADMIN — MEMANTINE 10 MG: 10 TABLET ORAL at 22:14

## 2022-05-02 RX ADMIN — DONEPEZIL HYDROCHLORIDE 10 MG: 10 TABLET ORAL at 22:14

## 2022-05-02 RX ADMIN — Medication 10 ML: at 08:21

## 2022-05-02 RX ADMIN — LEVETIRACETAM 500 MG: 5 INJECTION INTRAVASCULAR at 08:20

## 2022-05-02 RX ADMIN — GUAIFENESIN 600 MG: 600 TABLET ORAL at 22:14

## 2022-05-02 RX ADMIN — BARIUM SULFATE 55 ML: 0.81 POWDER, FOR SUSPENSION ORAL at 13:45

## 2022-05-02 RX ADMIN — MEROPENEM 1 G: 1 INJECTION, POWDER, FOR SOLUTION INTRAVENOUS at 04:47

## 2022-05-02 RX ADMIN — ENOXAPARIN SODIUM 40 MG: 100 INJECTION SUBCUTANEOUS at 22:14

## 2022-05-02 RX ADMIN — MEROPENEM 1 G: 1 INJECTION, POWDER, FOR SOLUTION INTRAVENOUS at 15:30

## 2022-05-02 RX ADMIN — LEVETIRACETAM 500 MG: 500 TABLET, FILM COATED ORAL at 22:14

## 2022-05-02 RX ADMIN — BARIUM SULFATE 50 ML: 400 SUSPENSION ORAL at 13:45

## 2022-05-02 RX ADMIN — VANCOMYCIN HYDROCHLORIDE 1250 MG: 5 INJECTION, POWDER, LYOPHILIZED, FOR SOLUTION INTRAVENOUS at 18:51

## 2022-05-02 RX ADMIN — LATANOPROST 1 DROP: 50 SOLUTION OPHTHALMIC at 22:14

## 2022-05-02 NOTE — PLAN OF CARE
Goal Outcome Evaluation:   Pt is nonverbal and [place], daughter says she can read lips. VSS, 4L NC continue, lungs are congested throughout, pt has a weak, wet cough. NPO status maintained due to aspiration risk, D5 1/2 NS w/ 20 meq potassium infusing at 75ml/hr, pt does not appear to be in pain , will continuing with plan of care

## 2022-05-02 NOTE — THERAPY EVALUATION
Patient Name: Kezia Garcia  : 1940    MRN: [id number]                              Today's Date: 2022       Admit Date: 2022    Visit Dx:     ICD-10-CM ICD-9-CM   1. Sepsis, due to unspecified organism, unspecified whether acute organ dysfunction present (HCC)  A41.9 038.9     995.91   2. Oropharyngeal dysphagia  R13.12 787.22   3. Decreased activities of daily living (ADL)  Z78.9 V49.89     Patient Active Problem List   Diagnosis   • Sepsis, due to unspecified organism, unspecified whether acute organ dysfunction present (HCC)   • Seizure disorder (HCC)   • Hypokalemia   • COPD (chronic obstructive pulmonary disease) (HCC)   • Hypothyroidism   • Dementia (HCC)   • Difficulty with speech   • Hypertension   • History of CVA (cerebrovascular accident)   • Hypophosphatemia     Past Medical History:   Diagnosis Date   • Anemia    • COPD (chronic obstructive pulmonary disease) (HCC)    • Coronary artery disease    • Dementia (HCC)    • Disease of thyroid gland    • Hyperkalemia    • Hyperlipidemia    • Hypertension    • Seizures ([facility])      History reviewed. No pertinent surgical history.   General Information     Row Name 22 0832          OT Time and Intention    Document Type evaluation  -PG     Mode of Treatment individual therapy;occupational therapy  -PG     Row Name 22 0832          General Information    Patient Profile Reviewed yes  -PG     Prior Level of Function ADL's;dependent:  -PG     Existing Precautions/Restrictions fall  -PG     Barriers to Rehab cognitive status;hearing deficit  -PG     Row Name 22 0832          Occupational Profile    Reason for Services/Referral (Occupational Profile) Patient is a 81-year-old female admitted from a nursing and rehab facility.  No prior OT services indicated.  Patient is being evaluated to assess ADL status and any discharge needs.  -PG     Row Name 22 0832          Living Environment    People in Home facility resident  -PG      Row Name 05/02/22 0832          Cognition    Orientation Status (Cognition) oriented to;person  -PG     Row Name 05/02/22 0832          Safety Issues, Functional Mobility    Safety Issues Affecting Function (Mobility) ability to follow commands  -PG     Comment, Safety Issues/Impairments (Mobility) Not applicable-no further OT services recommended  -PG           User Key  (r) = Recorded By, (t) = Taken By, (c) = Cosigned By    Initials Name Provider Type    PG Gideon Ruffin, OT Occupational Therapist                 Mobility/ADL's     Row Name 05/02/22 0834          Bed Mobility    Comment, (Bed Mobility) Dependent with bed mobility  -     Row Name 05/02/22 0834          Transfers    Comment, (Transfers) Not test  -     Row Name 05/02/22 0834          Activities of Daily Living    BADL Assessment/Intervention bathing;upper body dressing;lower body dressing;grooming;toileting  -     Row Name 05/02/22 0834          Bathing Assessment/Intervention    [place] Level (Bathing) bathing skills;dependent (less than 25% patient effort)  -     Row Name 05/02/22 0834          Upper Body Dressing Assessment/Training    [place] Level (Upper Body Dressing) upper body dressing skills;dependent (less than 25% patient effort)  -     Row Name 05/02/22 0834          Lower Body Dressing Assessment/Training    [place] Level (Lower Body Dressing) lower body dressing skills;dependent (less than 25% patient effort)  -     Row Name 05/02/22 0834          Grooming Assessment/Training    [place] Level (Grooming) grooming skills;dependent (less than 25% patient effort)  -     Row Name 05/02/22 0834          Toileting Assessment/Training    [place] Level (Toileting) toileting skills;dependent (less than 25% patient effort)  -PG           User Key  (r) = Recorded By, (t) = Taken By, (c) = Cosigned By    Initials Name Provider Type    PG Gideon Ruffin, OT Occupational Therapist                Obj/Interventions     Row Name 05/02/22 0835          Sensory Assessment (Somatosensory)    Sensory Assessment (Somatosensory) unable/difficult to assess  -PG     [facility] Name 05/02/22 0835          Vision Assessment/Intervention    Visual Impairment/Limitations unable/difficult to assess  -PG     [facility] Name 05/02/22 0835          Range of Motion Comprehensive    General Range of Motion no range of motion deficits identified  -PG     Row Name 05/02/22 0835          Strength Comprehensive (MMT)    Comment, General Manual Muscle Testing (MMT) Assessment Deferred secondary to  decreased cognition and hearing  -PG           User Key  (r) = Recorded By, (t) = Taken By, (c) = Cosigned By    Initials Name Provider Type    PG Gideon Ruffin OT Occupational Therapist               Goals/Plan    No documentation.                Clinical Impression     Row Name 05/02/22 0836          Pain Assessment    Pretreatment Pain Rating 0/10 - no pain  -PG     Posttreatment Pain Rating 0/10 - no pain  -PG     Row Name 05/02/22 0836          Plan of Care Review    Plan of Care Reviewed With patient  -PG     Progress no change  -PG     Outcome Evaluation Patient is dependent with ADLs previously and nonambulatory.  No additional occupational therapy recommended at this time  -PG     Row Name 05/02/22 0836          Therapy Assessment/Plan (OT)    Patient/Family Therapy Goal Statement (OT) Unknown  -PG     Criteria for Skilled Therapeutic Interventions Met (OT) no;does not meet criteria for skilled intervention  -PG     Therapy Frequency (OT) evaluation only  -PG     Row Name 05/02/22 0836          Therapy Plan Review/Discharge Plan (OT)    Anticipated Discharge Disposition (OT) extended care facility  -PG           User Key  (r) = Recorded By, (t) = Taken By, (c) = Cosigned By    Initials Name Provider Type    PG Gideon Ruffin OT Occupational Therapist               Outcome Measures     [facility] Name 05/02/22 0837          How much help from another  is currently needed...    Putting on and taking off regular lower body clothing? 1  -PG     Bathing (including washing, rinsing, and drying) 1  -PG     Toileting (which includes using toilet bed pan or urinal) 1  -PG     Putting on and taking off regular upper body clothing 1  -PG     Taking care of personal grooming (such as brushing teeth) 1  -PG     Eating meals 1  -PG     AM-PAC 6 Clicks Score (OT) 6  -PG     Row Name 05/02/22 0837          Functional Assessment    Outcome Measure Options AM-PAC 6 Clicks Daily Activity (OT);Optimal Instrument  -PG     Row Name 05/02/22 0837          Optimal Instrument    Optimal Instrument Optimal - 3  -PG     Bending/Stooping 5  -PG     Standing 5  -PG     Reaching 3  -PG     From the list, choose the 3 activities you would most like to be able to do without any difficulty Bending/stooping;Standing;Reaching  -PG     Total Score Optimal - 3 13  -PG           User Key  (r) = Recorded By, (t) = Taken By, (c) = Cosigned By    Initials Name Provider Type    Gideon Angelo OT Occupational Therapist                  OT Recommendation and Plan  Therapy Frequency (OT): evaluation only  Plan of Care Review  Plan of Care Reviewed With: patient  Progress: no change  Outcome Evaluation: Patient is dependent with ADLs previously and nonambulatory.  No additional occupational therapy recommended at this time     Time Calculation:    Time Calculation- OT     Row Name 05/02/22 0838             Time Calculation- OT    OT Received On 05/02/22  -PG              Untimed Charges    OT Eval/Re-eval Minutes 30  -PG              Total Minutes    Untimed Charges Total Minutes 30  -PG       Total Minutes 30  -PG            User Key  (r) = Recorded By, (t) = Taken By, (c) = Cosigned By    Initials Name Provider Type    Gideon Angelo OT Occupational Therapist              Therapy Charges for Today     Code Description Service Date Service Provider Modifiers Qty    99243613323 HC OT EVAL LOW  COMPLEXITY 2 5/2/2022 Gideon Ruffin, OT GO 1               Gideon Ruffin, OT  5/2/2022

## 2022-05-02 NOTE — PLAN OF CARE
Goal Outcome Evaluation:  Plan of Care Reviewed With: daughter, patient        Progress: no change     VSS. No indication of pain or discomfort this shift. Pt has been awake and alert most of the day. Very Pleasant. Swallow study was completed and she is now Puree food with honey thick liquids to be feed 1:1 with spoon and sitting all the way up. No concerns at this time

## 2022-05-02 NOTE — MBS/VFSS/FEES
Acute Care - Speech Language Pathology   Swallow modified barium swallow  Marc     Patient Name: Kezia Garcia  : 1940  MRN: [id number]  Today's Date: 2022               Admit Date: 2022    Visit Dx:     ICD-10-CM ICD-9-CM   1. Sepsis, due to unspecified organism, unspecified whether acute organ dysfunction present (HCC)  A41.9 038.9     995.91   2. Oropharyngeal dysphagia  R13.12 787.22   3. Decreased activities of daily living (ADL)  Z78.9 V49.89     Patient Active Problem List   Diagnosis   • Sepsis, due to unspecified organism, unspecified whether acute organ dysfunction present (HCC)   • Seizure disorder (HCC)   • Hypokalemia   • COPD (chronic obstructive pulmonary disease) (HCC)   • Hypothyroidism   • Dementia (HCC)   • Difficulty with speech   • Hypertension   • History of CVA (cerebrovascular accident)   • Hypophosphatemia     Past Medical History:   Diagnosis Date   • Anemia    • COPD (chronic obstructive pulmonary disease) (HCC)    • Coronary artery disease    • Dementia (HCC)    • Disease of thyroid gland    • Hyperkalemia    • Hyperlipidemia    • Hypertension    • Seizures (HCC)      History reviewed. No pertinent surgical history.  MODIFIED BARIUM SWALLOW STUDY: SPEECH PATHOLOGY REPORT        DATE OF SERVICE: 2022    PERTINENT INFORMATION:  Ms. Garcia is a 81year old female with dysphagia.    She was referred for an MBSS by Dr. Hernandes to rule out aspiration as well as to determine appropriate treatment plan for this patient.  Patient's previous diet at the nursing home was purée and honey thickened liquids.  Patient with history of aspiration.      PROCEDURE:    Ms. Garcia was alert and cooperative.  The patient was viewed in lateral plane.  The following Ba consistencies were administered: Nectar thick barium, honey thick barium, barium mixed with applesauce.  The following compensatory swallowing strategies were performed: Bolus modification, cyclic  ingestion      RESULTS:    1.  Honey thick liquid by spoon.  Moderate lingual pumping with spillage to the vallecula and into laryngeal vestibule.  Swallow completed.  2.  Honey thickened liquid by cup.  Patient taking very large drink with max spillage to the pharynx.  Swallow completed.  3.  Nectar thick liquid by spoon.  Moderate lingual pumping with spillage to the vallecula.  Swallow completed with laryngeal penetration.  4.  Nectar thick liquid by cup.  Patient taking very large drink with max spillage to the pharynx.  Swallow completed with laryngeal penetration.  5.  Purée by spoon.  Lingual pumping with spillage to the vallecula.  Swallow completed.      IMPRESSIONS:    Ms. Garcia demonstrated oral pharyngeal dysphagia characterized by swallow delay.  Laryngeal penetration observed with nectar thick liquid by cup.  Patient with poor oral control with consistencies.  No aspiration observed during this study.        RECOMMENDATIONS:   1.  Diet: Purée solids, honey thickened liquids.  Trials of nectar thick liquids by spoon with one-on-one supervision/assist.  2.  Positioning: Fully upright for all p.o. intake, 30 minutes following  3.  Compensatory strategies: Small bites and sips, controlled drink, one-on-one assist for all p.o. intake.        Yes, patient/responsible party agrees with the plan of care and has been informed of all alternatives, risks and benefits.    Thank you for this referral.  SLP Recommendation and Plan                                                                      Plan of Care Reviewed With: patient          EDUCATION  The patient has been educated in the following areas:   Dysphagia (Swallowing Impairment).              Time Calculation:    Time Calculation- SLP     Row Name 05/02/22 1012             Time Calculation- SLP    SLP Stop Time 1000  -SN      SLP Received On 05/02/22  -SN              Untimed Charges    72135-FW Treatment Swallow Minutes 45  -SN              Total  Minutes    Untimed Charges Total Minutes 45  -SN       Total Minutes 45  -SN            User Key  (r) = Recorded By, (t) = Taken By, (c) = Cosigned By    Initials Name Provider Type    Patricia Segundo SLP Speech and Language Pathologist                Therapy Charges for Today     Code Description Service Date Service Provider Modifiers Qty    71132398878 HC ST EVAL ORAL PHARYNG SWALLOW 4 5/1/2022 Patricia Herr SLP GN 1    46774730713 HC ST TREATMENT SWALLOW 3 5/2/2022 Patricia Herr SLP GN 1    [id number] HC ST MOTION FLUORO EVAL SWALLOW 6 5/2/2022 Patricia Herr SLP GN 1               LAILA Overton  5/2/2022

## 2022-05-02 NOTE — PLAN OF CARE
Goal Outcome Evaluation:  Plan of Care Reviewed With: patient        Progress: no change  Outcome Evaluation: Patient is dependent with ADLs previously and nonambulatory.  No additional occupational therapy recommended at this time

## 2022-05-02 NOTE — CONSULTS
Purpose of the visit was to evaluate for: goals of care/advanced care planning. Spoke with RN, patient and family.  Left message with daughter.    Assessment: Pt is located on 3NT resting comfortably on 4L NC with IV. RN reported pt is having defecits with swallowing and family has been discussing options. Family is unsure if they want a feeding tube, since pt has yanked out renae cath twice. Pt is nonverbal and [place], but RN says pt can read lips and follow commands.   Discharge SW reported pt has a bed hold at [facility].   Palliative contacted daughter and left voicemail. Pending return call.      Recommendations/Plan: Discuss care options and code status with family.    Tasks Completed: Inititated contact with family about palliative care.    Other Comments: Palliative will continue to monitor and provide support.   [facility] [place]

## 2022-05-02 NOTE — PLAN OF CARE
Goal Outcome Evaluation:  Plan of Care Reviewed With: patient        Progress: no change  Outcome Evaluation: Patient is not appropriate for skilled physical therapy services at this time due to being dependent with all functional mobility and nonambulatory prior to hospitalization.  Patient is safe to return to nursing home once medically appropriate.

## 2022-05-02 NOTE — PROGRESS NOTES
[facility]   Hospitalist Progress Note  Date: 2022  Patient Name: Kezia Garcia  : 1940  MRN: [id number]  Date of admission: 2022      Subjective   Subjective     Chief Complaint: Follow up for difficulty breathing    Summary: 80 y/o F with history of CVA, with  shunt and seizure history, dementia COPD, CAD, hypothyroidism, HLD, and HTN presents from nursing home with report of difficulty breathing. Met sepsis criteria. WBC 18.2. CXR no active disease. Influenza and COVID negative.  Concern for aspiration, started on vancomycin and meropenem given allergy profile.    Interval Followup: No issues overnight.  No fevers. Requiring -5 L/min oxygen to maintain O2 saturation above 90%.  Patient remains alert but nonverbal.  She appeared comfortable and was smiling during exam.  No RN issues reported.  Remains NPO.  No hypoglycemia.  Creatinine stable.  Hemoglobin stable.  Maintaining adequate urine output.    Review of Systems  Unable to obtain due to dementia/nonverbal    Objective   Objective     Vitals:   Temp:  [97.8 °F (36.6 °C)-98.4 °F (36.9 °C)] 97.8 °F (36.6 °C)  Heart Rate:  [55-71] 55  Resp:  [18-22] 18  BP: (122-147)/(33-62) 141/56  Flow (L/min):  [4-5] 5  Physical Exam    Constitutional: Elderly  female, chronically ill appearing, awake in bed, nonconversant, NAD   Eyes: Pupils equal and reactive, no conjunctival injection   HENT: NCAT, nares patent, dry mucous membrane   Neck: Supple, trachea midline   Respiratory: Audible gurgling sounds, equal aeration with bilateral rhonchi, nonlabored respiration   Cardiovascular: RRR, no murmurs, no pedal edema   Gastrointestinal: Positive bowel sounds, soft, nondistended   Musculoskeletal: No gross joint deformities, no clubbing or cyanosis to extremities   Neurologic: Nonverbal, alert, seems to track movements appropriately, no obvious focal deficit, no facial droop   Skin: Warm and dry, no rashes, no ecchymoses    Result Review     Result Review:  I have personally reviewed the results from the time of this admission to 5/2/2022 17:19 EDT and agree with these findings:  [x]  Laboratory   CBC    CBC 4/30/22 5/1/22 5/2/22   WBC 18.19 (A) 7.62 5.23   RBC 3.76 (A) 2.86 (A) 3.03 (A)   Hemoglobin 12.2 9.4 (A) 9.6 (A)   Hematocrit 37.6 28.8 (A) 30.6 (A)   .0 (A) 100.7 (A) 101.0 (A)   MCH 32.4 32.9 31.7   MCHC 32.4 32.6 31.4 (A)   RDW 15.0 14.6 14.3   Platelets 278 238 238   (A) Abnormal value            BMP    BMP 4/30/22 5/1/22 5/2/22   BUN 30 (A) 23 19   Creatinine 1.10 (A) 0.86 0.77   Sodium 142 144 142   Potassium 3.4 (A) 3.1 (A) 3.4 (A)   Chloride 103 109 (A) 110 (A)   CO2 25.8 25.2 25.0   Calcium 9.5 8.5 (A) 8.3 (A)   (A) Abnormal value              [x]  Microbiology  [x]  Radiology    []  EKG/Telemetry   []  Cardiology/Vascular   []  Pathology  []  Old records  [x]  Other:     Intake/Output Summary (Last 24 hours) at 5/2/2022 1721  Last data filed at 5/2/2022 1530  Gross per 24 hour   Intake 1133.75 ml   Output 700 ml   Net 433.75 ml         Assessment/Plan   Assessment / Plan     Assessment/Plan:  Active Hospital Problems    Diagnosis  POA   • **Sepsis, due to unspecified organism, unspecified whether acute organ dysfunction present ([facility]) [A41.9]  Yes   • Seizure disorder ([facility]) [G40.909]  Yes   • Hypokalemia [E87.6]  Unknown   • COPD (chronic obstructive pulmonary disease) ([facility]) [J44.9]  Unknown   • Hypothyroidism [E03.9]  Yes   • Dementia ([facility]) [F03.90]  Yes   • Difficulty with speech [R47.9]  Yes   • Hypertension [I10]  Yes   • History of CVA (cerebrovascular accident) [Z86.73]  Not Applicable   • Hypophosphatemia [E83.39]  Unknown           Appreciate speech evaluation  S/p MBSS -> diet advanced to modified diet  Keep head of bed elevated  Aspiration precaution  Stop IV fluids    MRSA PCR positive.  Preliminary cultures no growth  Continue IV vancomycin and meropenem  De-escalate based off of finalized cultures  Pharmacy to dose  and monitor vancomycin levels via AUC method    Wean supplemental oxygen, SPO2 goal >90%  DuoNebs 4 times a day scheduled  Bronchopulmonary hygiene protocol  Nasotracheal suctioning as needed per RT  Start Mucinex 600 mg twice daily    Continue Keppra 500 mg every 12 hours.  Switch to p.o.     TSH 1.250 indicating home regimen of Synthroid is at appropriate dosage; continue 300 mcg daily    Start daily p.o. potassium chloride 20 mEq q. Daily; repeat BMP in a.m.  Restart home Aricept and Namenda    Continue Lovenox 40 mg q. daily for DVT ppx    Discussed plan with staff.     DVT prophylaxis:  Medical DVT prophylaxis orders are present.    CODE STATUS:   Code Status (Patient has no pulse and is not breathing): CPR (Attempt to Resuscitate)  Medical Interventions (Patient has pulse or is breathing): Full Support  Electronically signed by Tariq Hernandes DO, 05/02/22, 5:19 PM EDT.

## 2022-05-02 NOTE — CONSULTS
"Nutrition Services    Patient Name: Kezia Garcia  YOB: 1940  MRN: [id number]  Admission date: 4/30/2022      CLINICAL NUTRITION ASSESSMENT      Reason for Assessment  Physician consult     H&P:    Past Medical History:   Diagnosis Date   • Anemia    • COPD (chronic obstructive pulmonary disease) (HCC)    • Coronary artery disease    • Dementia (HCC)    • Disease of thyroid gland    • Hyperkalemia    • Hyperlipidemia    • Hypertension    • Seizures (HCC)         Current Problems:   Active Hospital Problems    Diagnosis    • **Sepsis, due to unspecified organism, unspecified whether acute organ dysfunction present (HCC)    • Seizure disorder (HCC)    • Hypokalemia    • COPD (chronic obstructive pulmonary disease) (HCC)    • Hypothyroidism    • Dementia (HCC)    • Difficulty with speech    • Hypertension    • History of CVA (cerebrovascular accident)    • Hypophosphatemia         Nutrition/Diet History         Narrative     RD consult for report of 14-23 lbs weight loss and decreased intake. Pt brought in from SNF dx of sepsis. Pt in non-verbal and failed SLP eval, currently NPO per recommendation. Scheduled for MBS. MD to discuss Cortrak vs PEG vs no artifical nutrition with family however it has been noted that pt pulls at tubing. No appropriate nutrition interventions at this time.       Anthropometrics        Current Height, Weight Height: 157.5 cm (62\")  Weight: 74.4 kg (164 lb 0.4 oz)   Current BMI Body mass index is 30 kg/m².       Weight Hx  Wt Readings from Last 30 Encounters:   04/30/22 2302 74.4 kg (164 lb 0.4 oz)   04/30/22 1332 79.3 kg (174 lb 13.2 oz)   03/05/22 1930 79 kg (174 lb 2.6 oz)   12/25/21 1420 80.9 kg (178 lb 5.6 oz)   04/26/19 0000 101 kg (222 lb 6 oz)   04/15/19 0000 102 kg (225 lb 2 oz)   04/10/19 0000 101 kg (222 lb)   03/08/19 0817 102 kg (223 lb 15.8 oz)   01/25/19 0924 103 kg (226 lb 13.7 oz)   01/10/19 0000 99.3 kg (219 lb)   11/14/18 1019 100 kg (220 lb 7.4 oz) "   10/15/18 0000 97.7 kg (215 lb 8 oz)   07/06/18 0000 94.8 kg (209 lb)   06/18/18 0000 94.9 kg (209 lb 2 oz)   06/15/18 0000 98.9 kg (218 lb)   06/06/18 0000 98 kg (216 lb)   04/19/18 0000 98.9 kg (218 lb)   01/30/18 0000 93.9 kg (207 lb)   01/29/18 0000 94.5 kg (208 lb 4 oz)   01/18/18 0000 95.3 kg (210 lb)            Wt Change Observation Stable x 4 month using 174# reading, unsure if 164 or 174 is accurate     Estimated/Assessed Needs       Energy Requirements    EST Needs (kcal/day) 4876-0963       Protein Requirements    EST Daily Needs (g/day) 74-89       Fluid Requirements     Estimated Needs (mL/day) 8662-0617     Labs/Medications         Pertinent Labs Reviewed.   Results from last 7 days   Lab Units 05/02/22  1143 05/01/22  1308 04/30/22  1632   SODIUM mmol/L 142 144 142   POTASSIUM mmol/L 3.4* 3.1* 3.4*   CHLORIDE mmol/L 110* 109* 103   CO2 mmol/L 25.0 25.2 25.8   BUN mg/dL 19 23 30*   CREATININE mg/dL 0.77 0.86 1.10*   CALCIUM mg/dL 8.3* 8.5* 9.5   BILIRUBIN mg/dL  --  0.3 0.4   ALK PHOS U/L  --  34* 39   ALT (SGPT) U/L  --  17 19   AST (SGOT) U/L  --  22 27   GLUCOSE mg/dL 107* 94 124*     Results from last 7 days   Lab Units 05/02/22  1143 05/01/22  1308   MAGNESIUM mg/dL  --  2.1   PHOSPHORUS mg/dL  --  2.3*   HEMOGLOBIN g/dL 9.6* 9.4*   HEMATOCRIT % 30.6* 28.8*     COVID19   Date Value Ref Range Status   04/30/2022 Not Detected Not Detected - Ref. Range Final     Lab Results   Component Value Date    HGBA1C 5.7 06/04/2019         Pertinent Medications Reviewed.     Current Nutrition Orders & Evaluation of Intake       Oral Nutrition     Current PO Diet NPO Diet NPO Type: Strict NPO   Supplement No active supplement orders       Malnutrition Severity Assessment                Nutrition Diagnosis         Nutrition Dx Problem 1 Inadequate oral Intake related to Inability to consume sufficient energy as evidenced by NPO and SLP/swallow eval       Nutrition Intervention         No nutrition interventions  at this time     Medical Nutrition Therapy/Nutrition Education          Learner     Readiness Other  Education not appropriate at this time     Method     Response Other  Other     Monitor/Evaluation        Monitor Pertinent labs, Swallow function, Diet advancement       Nutrition Discharge Plan         To be determined       Electronically signed by:  ITZEL LINARES RD  05/02/22 13:32 EDT

## 2022-05-02 NOTE — THERAPY TREATMENT NOTE
Acute Care - Speech Language Pathology   Swallow Treatment Note  Marc     Patient Name: Kezia Garcia  : 1940  MRN: [id number]  Today's Date: 2022               Admit Date: 2022    Visit Dx:     ICD-10-CM ICD-9-CM   1. Sepsis, due to unspecified organism, unspecified whether acute organ dysfunction present (HCC)  A41.9 038.9     995.91   2. Oropharyngeal dysphagia  R13.12 787.22   3. Decreased activities of daily living (ADL)  Z78.9 V49.89     Patient Active Problem List   Diagnosis   • Sepsis, due to unspecified organism, unspecified whether acute organ dysfunction present (HCC)   • Seizure disorder (HCC)   • Hypokalemia   • COPD (chronic obstructive pulmonary disease) (HCC)   • Hypothyroidism   • Dementia (HCC)   • Difficulty with speech   • Hypertension   • History of CVA (cerebrovascular accident)   • Hypophosphatemia     Past Medical History:   Diagnosis Date   • Anemia    • COPD (chronic obstructive pulmonary disease) (HCC)    • Coronary artery disease    • Dementia (HCC)    • Disease of thyroid gland    • Hyperkalemia    • Hyperlipidemia    • Hypertension    • Seizures ([facility])      History reviewed. No pertinent surgical history.  SPEECH PATHOLOGY DYSPHAGIA TREATMENT    Subjective/Behavioral Observations: Awake, nonverbal        Day/time of Treatment: 2022        Current Diet: N.p.o.        Treatment received: Treatment focused on trials of p.o. for decision of initiating safe diet, proceed with modified barium swallow study and/or cortrak placement.  Patient with significant history of silent aspiration.        Results of treatment: Congested cough observed prior to administration of any consistencies.  Laryngeal sounds clear to auscultation prior to trials of p.o. intake.  1/4 teaspoon bolus size of honey thickened liquids with moderate lingual pumping.  Swallow timing however improved however still greater than 15 seconds.  1/2 teaspoon bolus size of honey thickened liquid with  moderate lingual pumping.  Swallow greater than 15 seconds.  Delayed cough.  Purée by 1/4 teaspoon bolus size.  Moderate lingual pumping.  Swallow completed greater than 10 seconds.  Laryngeal sounds clear to auscultation.        Progress toward goals: Improved swallow timing compared to evaluation however continues to be greater than 15 seconds.        Barriers to Achieving goals: History of dysphagia and aspiration        Plan of care:/changes in plan: Will proceed with modified barium swallow study for further investigation of swallow function and determine if patient can be started on a safe p.o. diet.  Further diet recommendations pending results of modified barium swallow study.                SLP Recommendation and Plan                                                                      Plan of Care Reviewed With: patient          EDUCATION  The patient has been educated in the following areas:   Dysphagia (Swallowing Impairment).              Time Calculation:    Time Calculation- SLP     Row Name 05/02/22 1012             Time Calculation- SLP    SLP Stop Time 1000  -SN      SLP Received On 05/02/22  -SN              Untimed Charges    47436-DG Treatment Swallow Minutes 45  -SN              Total Minutes    Untimed Charges Total Minutes 45  -SN       Total Minutes 45  -SN            User Key  (r) = Recorded By, (t) = Taken By, (c) = Cosigned By    Initials Name Provider Type    SN Patricia Herr SLP Speech and Language Pathologist                Therapy Charges for Today     Code Description Service Date Service Provider Modifiers Qty    41249491631 HC ST EVAL ORAL PHARYNG SWALLOW 4 5/1/2022 Patricia Herr SLP GN 1    [id number] HC ST TREATMENT SWALLOW 3 5/2/2022 Patricia Herr SLP GN 1               LAILA Overton  5/2/2022

## 2022-05-02 NOTE — THERAPY EVALUATION
Acute Care - Physical Therapy Initial Evaluation  [facility]     Patient Name: Kezia Garcia  : 1940  MRN: [id number]  Today's Date: 2022      Visit Dx:     ICD-10-CM ICD-9-CM   1. Sepsis, due to unspecified organism, unspecified whether acute organ dysfunction present (HCC)  A41.9 038.9     995.91   2. Oropharyngeal dysphagia  R13.12 787.22   3. Decreased activities of daily living (ADL)  Z78.9 V49.89   4. Difficulty walking  R26.2 719.7     Patient Active Problem List   Diagnosis   • Sepsis, due to unspecified organism, unspecified whether acute organ dysfunction present (HCC)   • Seizure disorder (HCC)   • Hypokalemia   • COPD (chronic obstructive pulmonary disease) (HCC)   • Hypothyroidism   • Dementia (HCC)   • Difficulty with speech   • Hypertension   • History of CVA (cerebrovascular accident)   • Hypophosphatemia     Past Medical History:   Diagnosis Date   • Anemia    • COPD (chronic obstructive pulmonary disease) (HCC)    • Coronary artery disease    • Dementia (HCC)    • Disease of thyroid gland    • Hyperkalemia    • Hyperlipidemia    • Hypertension    • Seizures ([facility])      History reviewed. No pertinent surgical history.  PT Assessment (last 12 hours)     PT Evaluation and Treatment     Row Name 22 1500          Physical Therapy Time and Intention    Subjective Information no complaints  -CS     Document Type evaluation  -CS     Mode of Treatment individual therapy;physical therapy  -CS     Patient Effort poor  -CS     Row Name 22 1500          General Information    Patient Profile Reviewed yes  -CS     Patient Observations alert  -CS     Prior Level of Function dependent:  -CS     Existing Precautions/Restrictions no known precautions/restrictions  -CS     Barriers to Rehab previous functional deficit;cognitive status  -CS     Row Name 22 1500          Living Environment    Current Living Arrangements residential facility;extended care facility  [facility]  -     Row Name 05/02/22 1500          Cognition    Orientation Status (Cognition) oriented to;person  -     Row Name 05/02/22 1500          Range of Motion Comprehensive    General Range of Motion bilateral lower extremity ROM WFL  PROM/AAROM  -     Row Name 05/02/22 1500          Strength Comprehensive (MMT)    General Manual Muscle Testing (MMT) Assessment lower extremity strength deficits identified  -CS     Comment, General Manual Muscle Testing (MMT) Assessment Unable to formally evaluate or test due to decreased cognitive status and inability to follow direction/commands  -     Row Name 05/02/22 1500          Bed Mobility    Bed Mobility bed mobility (all) activities  -CS     All Activities, [place] (Bed Mobility) dependent (less than 25% patient effort)  -     Row Name 05/02/22 1500          Plan of Care Review    Plan of Care Reviewed With patient  -CS     Progress no change  -CS     Outcome Evaluation Patient is not appropriate for skilled physical therapy services at this time due to being dependent with all functional mobility and nonambulatory prior to hospitalization.  Patient is safe to return to nursing home once medically appropriate.  -     Row Name 05/02/22 1500          Therapy Assessment/Plan (PT)    Criteria for Skilled Interventions Met (PT) does not meet criteria for skilled intervention  -CS     Therapy Frequency (PT) evaluation only  -     Row Name 05/02/22 1500          PT Evaluation Complexity    History, PT Evaluation Complexity 1-2 personal factors and/or comorbidities  -CS     Examination of Body Systems (PT Eval Complexity) total of 4 or more elements  -CS     Clinical Presentation (PT Evaluation Complexity) stable  -CS     Clinical Decision Making (PT Evaluation Complexity) low complexity  -CS     Overall Complexity (PT Evaluation Complexity) low complexity  -     Row Name 05/02/22 1500          Therapy Plan Review/Discharge Plan (PT)    Therapy Plan  Review (PT) evaluation/treatment results reviewed;patient  -CS           User Key  (r) = Recorded By, (t) = Taken By, (c) = Cosigned By    Initials Name Provider Type    Anastacia Alegre PT Physical Therapist                  PT Recommendation and Plan  Anticipated Discharge Disposition (PT): extended care facility, sub acute care setting  Therapy Frequency (PT): evaluation only  Plan of Care Reviewed With: patient  Progress: no change  Outcome Evaluation: Patient is not appropriate for skilled physical therapy services at this time due to being dependent with all functional mobility and nonambulatory prior to hospitalization.  Patient is safe to return to nursing home once medically appropriate.   Outcome Measures     Row Name 05/02/22 1500             How much help from another person do you currently need...    Turning from your back to your side while in flat bed without using bedrails? 1  -CS      Moving from lying on back to sitting on the side of a flat bed without bedrails? 1  -CS      Moving to and from a bed to a chair (including a wheelchair)? 1  -CS      Standing up from a chair using your arms (e.g., wheelchair, bedside chair)? 1  -CS      Climbing 3-5 steps with a railing? 1  -CS      To walk in hospital room? 1  -CS      AM-PAC 6 Clicks Score (PT) 6  -CS      Highest level of mobility 2 --> Turned self in bed/ bed activities  -CS              Functional Assessment    Outcome Measure Options AM-PAC 6 Clicks Basic Mobility (PT)  -CS            User Key  (r) = Recorded By, (t) = Taken By, (c) = Cosigned By    Initials Name Provider Type    Anastacia Alegre PT Physical Therapist                 Time Calculation:    PT Charges     Row Name 05/02/22 1513             Time Calculation    PT Received On 05/02/22  -CS              Untimed Charges    PT Eval/Re-eval Minutes 25  -CS              Total Minutes    Untimed Charges Total Minutes 25  -CS       Total Minutes 25  -CS            User Key  (r) =  Recorded By, (t) = Taken By, (c) = Cosigned By    Initials Name Provider Type    CS Anastacia Lepe, PT Physical Therapist              Therapy Charges for Today     Code Description Service Date Service Provider Modifiers Qty    08336159646 HC PT EVAL LOW COMPLEXITY 2 5/2/2022 Anastacia Lepe PT GP 1          PT G-Codes  Outcome Measure Options: AM-PAC 6 Clicks Basic Mobility (PT)  AM-PAC 6 Clicks Score (PT): 6  AM-PAC 6 Clicks Score (OT): 6    Anastacia Lepe PT  5/2/2022

## 2022-05-02 NOTE — SIGNIFICANT NOTE
05/02/22 3910   Plan   Plan Comments Palliative has been consulted. Pt has a bedhold at [facility]. We will continue to follow up.

## 2022-05-03 PROBLEM — J69.0 ASPIRATION PNEUMONIA: Status: ACTIVE | Noted: 2022-05-03

## 2022-05-03 PROBLEM — A41.9 SEPSIS, DUE TO UNSPECIFIED ORGANISM, UNSPECIFIED WHETHER ACUTE ORGAN DYSFUNCTION PRESENT (HCC): Status: RESOLVED | Noted: 2022-04-30 | Resolved: 2022-05-03

## 2022-05-03 LAB
ANION GAP SERPL CALCULATED.3IONS-SCNC: 9.4 MMOL/L (ref 5–15)
BUN SERPL-MCNC: 19 MG/DL (ref 8–23)
BUN/CREAT SERPL: 25.7 (ref 7–25)
CALCIUM SPEC-SCNC: 8.3 MG/DL (ref 8.6–10.5)
CHLORIDE SERPL-SCNC: 112 MMOL/L (ref 98–107)
CO2 SERPL-SCNC: 21.6 MMOL/L (ref 22–29)
CREAT SERPL-MCNC: 0.74 MG/DL (ref 0.57–1)
DEPRECATED RDW RBC AUTO: 55 FL (ref 37–54)
EGFRCR SERPLBLD CKD-EPI 2021: 82.4 ML/MIN/1.73
ERYTHROCYTE [DISTWIDTH] IN BLOOD BY AUTOMATED COUNT: 14.2 % (ref 12.3–15.4)
GLUCOSE SERPL-MCNC: 96 MG/DL (ref 65–99)
HCT VFR BLD AUTO: 33.1 % (ref 34–46.6)
HGB BLD-MCNC: 10 G/DL (ref 12–15.9)
MCH RBC QN AUTO: 31.5 PG (ref 26.6–33)
MCHC RBC AUTO-ENTMCNC: 30.2 G/DL (ref 31.5–35.7)
MCV RBC AUTO: 104.4 FL (ref 79–97)
PLATELET # BLD AUTO: 239 10*3/MM3 (ref 140–450)
PMV BLD AUTO: 10.9 FL (ref 6–12)
POTASSIUM SERPL-SCNC: 3.3 MMOL/L (ref 3.5–5.2)
RBC # BLD AUTO: 3.17 10*6/MM3 (ref 3.77–5.28)
SODIUM SERPL-SCNC: 143 MMOL/L (ref 136–145)
VANCOMYCIN SERPL-MCNC: 16.3 MCG/ML (ref 5–40)
WBC NRBC COR # BLD: 5.56 10*3/MM3 (ref 3.4–10.8)

## 2022-05-03 PROCEDURE — 25010000002 MEROPENEM PER 100 MG: Performed by: INTERNAL MEDICINE

## 2022-05-03 PROCEDURE — 25010000002 ENOXAPARIN PER 10 MG: Performed by: INTERNAL MEDICINE

## 2022-05-03 PROCEDURE — 94799 UNLISTED PULMONARY SVC/PX: CPT

## 2022-05-03 PROCEDURE — 80202 ASSAY OF VANCOMYCIN: CPT | Performed by: INTERNAL MEDICINE

## 2022-05-03 PROCEDURE — 92526 ORAL FUNCTION THERAPY: CPT

## 2022-05-03 PROCEDURE — 85027 COMPLETE CBC AUTOMATED: CPT | Performed by: INTERNAL MEDICINE

## 2022-05-03 PROCEDURE — 99232 SBSQ HOSP IP/OBS MODERATE 35: CPT | Performed by: INTERNAL MEDICINE

## 2022-05-03 PROCEDURE — 80048 BASIC METABOLIC PNL TOTAL CA: CPT | Performed by: INTERNAL MEDICINE

## 2022-05-03 RX ORDER — POTASSIUM CHLORIDE 750 MG/1
40 CAPSULE, EXTENDED RELEASE ORAL DAILY
Status: DISCONTINUED | OUTPATIENT
Start: 2022-05-03 | End: 2022-05-04 | Stop reason: HOSPADM

## 2022-05-03 RX ADMIN — LEVOTHYROXINE SODIUM 300 MCG: 0.15 TABLET ORAL at 05:19

## 2022-05-03 RX ADMIN — MEROPENEM 1 G: 1 INJECTION, POWDER, FOR SOLUTION INTRAVENOUS at 12:06

## 2022-05-03 RX ADMIN — Medication 10 ML: at 21:58

## 2022-05-03 RX ADMIN — LEVETIRACETAM 500 MG: 500 TABLET, FILM COATED ORAL at 21:58

## 2022-05-03 RX ADMIN — MEMANTINE 10 MG: 10 TABLET ORAL at 08:52

## 2022-05-03 RX ADMIN — ENOXAPARIN SODIUM 40 MG: 100 INJECTION SUBCUTANEOUS at 21:59

## 2022-05-03 RX ADMIN — MEROPENEM 1 G: 1 INJECTION, POWDER, FOR SOLUTION INTRAVENOUS at 03:25

## 2022-05-03 RX ADMIN — POTASSIUM CHLORIDE 40 MEQ: 750 CAPSULE, EXTENDED RELEASE ORAL at 08:52

## 2022-05-03 RX ADMIN — DONEPEZIL HYDROCHLORIDE 10 MG: 10 TABLET ORAL at 21:59

## 2022-05-03 RX ADMIN — LEVETIRACETAM 500 MG: 500 TABLET, FILM COATED ORAL at 08:52

## 2022-05-03 RX ADMIN — Medication 10 ML: at 08:52

## 2022-05-03 RX ADMIN — MEROPENEM 1 G: 1 INJECTION, POWDER, FOR SOLUTION INTRAVENOUS at 21:58

## 2022-05-03 RX ADMIN — GUAIFENESIN 600 MG: 600 TABLET ORAL at 21:59

## 2022-05-03 RX ADMIN — MEMANTINE 10 MG: 10 TABLET ORAL at 21:59

## 2022-05-03 RX ADMIN — GUAIFENESIN 600 MG: 600 TABLET ORAL at 08:52

## 2022-05-03 NOTE — PLAN OF CARE
Goal Outcome Evaluation:  Plan of Care Reviewed With: patient        Progress: no change  Outcome Evaluation: vitals stable; alert to self at baseline. no other significant changes at this time.

## 2022-05-03 NOTE — PROGRESS NOTES
"Pharmacy to Dose Vancomycin Day: 4  DURATION OF THERAPY: 5-7-22    INDICATION: SEPSIS  GOAL AUC: 400-600 MG/L.HR    HT: 157.5 cm (62\")       04/30/22  2302      Weight: 74.4 kg (164 lb 0.4 oz)         Estimated Creatinine Clearance: 58.2 mL/min (by C-G formula based on SCr of 0.74 mg/dL).  Results from last 7 days  Lab  Units  05/03/22  0509  05/02/22  1143  05/01/22  1308  04/30/22  1632  BUN  mg/dL  19 19 23  30*  CREATININE  mg/dL  0.74  0.77  0.86  1.10*    HD/PD/CRRT?: NO  CONTRAST ADMINISTERED? NO  I/O last 3 completed shifts:  In: 1419.8 [P.O.:236; I.V.:833.8; IV Piggyback:350]  Out: 700 [Urine:700]    Lab Results   Component Value Date    WBC 5.56 05/03/2022      Temperature    05/02/22 1920 05/02/22 2312 05/03/22 0259   Temp: 97.7 °F (36.5 °C) 97.5 °F (36.4 °C) 97.7 °F (36.5 °C)         Microbiology Results (last 10 days)       Procedure Component Value - Date/Time    MRSA Screen, PCR (Inpatient) - Swab, Nares [[id number]]  (Abnormal) Collected: 05/02/22 1252    Lab Status: Final result Specimen: Swab from Nares Updated: 05/02/22 1625     MRSA PCR MRSA Detected    Urine Culture - Urine, Urine, Catheter [[id number]]  (Normal) Collected: 04/30/22 2104    Lab Status: Final result Specimen: Urine, Catheter Updated: 05/01/22 2006     Urine Culture No growth    Influenza Antigen, Rapid - Swab, Nasopharynx [[id number]]  (Normal) Collected: 04/30/22 1447    Lab Status: Final result Specimen: Swab from Nasopharynx Updated: 04/30/22 1546     Influenza A Ag, EIA Negative     Influenza B Ag, EIA Negative    COVID-19,APTIMA PANTHER(USHA),[facility]KA, NP/OP SWAB IN UTM/VTM/SALINE TRANSPORT MEDIA,24 HR TAT - Swab, Nasopharynx [[id number]]  (Normal) Collected: 04/30/22 1447    Lab Status: Final result Specimen: Swab from Nasopharynx Updated: 04/30/22 2152     COVID19 Not Detected    Narrative:      Fact sheet for providers: https://www.fda.gov/media/775571/download     Fact sheet for patients: " "https://www.fda.gov/media/712181/download    Test performed by RT PCR.    Blood Culture - Blood, Arm, Left [[id number]]  (Normal) Collected: 04/30/22 1429    Lab Status: Preliminary result Specimen: Blood from Arm, Left Updated: 05/02/22 1501     Blood Culture No growth at 2 days    Blood Culture - Blood, Arm, Left [[id number]]  (Normal) Collected: 04/30/22 1429    Lab Status: Preliminary result Specimen: Blood from Arm, Left Updated: 05/02/22 1502     Blood Culture No growth at 2 days           04/30/22 1506  XR Chest    No active cardiopulmonary disease is seen    Other Antimicrobial Therapy: Merrem    ASSESSMENT / PLAN:    Regimen: 1500 mg IV every 24 hours.  Start time: 08:13 on 05/03/2022  Exposure target: AUC24 (range)400-600 mg/L.hr   AUC24,ss: 488 mg/L.hr  PAUC*: 85 %  Ctrough,ss: 13.5 mg/L  Pconc*: 10 %  Tox.: 9 %    MD NOTE:    \"MRSA PCR positive.  Preliminary cultures no growth  Continue IV vancomycin and meropenem  De-escalate based off of finalized cultures\"    VANCOMYCIN LEVEL THIS AM REPORTED AS 16.3.  ADJUSTED VANCOMYCIN DOSE TO 1500MG C38XMGWP PER [facility]IGH[facility] RECOMMENDATION.    BMP TOMORROW  "

## 2022-05-03 NOTE — SIGNIFICANT NOTE
05/03/22 1600   SOCIAL WORK ASSESSMENT   Referral Source physician   Reason for Consult palliative care   Visit Type ongoing     Pt improving and able to tolerate a diet per RN today. RN has been working closely with pt's daughter in the evenings and able to provide support and decipher palliative needs, since pt's daughter works during the day. Goal for pt is to return to [facility] once diet is addressed. Daughter returned palliative's previous call, but SW was out of office.   Palliative SW reached out to daughter again today to discuss code status and left a message. Appreciate RN providing emotional support to family and discussing GOC.   Palliative will continue to monitor and provide support.   WIN [place]

## 2022-05-03 NOTE — PROGRESS NOTES
[facility]   Hospitalist Progress Note  Date: 5/3/2022  Patient Name: Kezia Garcia  : 1940  MRN: [id number]  Date of admission: 2022      Subjective   Subjective     Chief Complaint: Follow up for difficulty breathing    Summary: 80 y/o F with history of CVA, with  shunt and seizure history, dementia COPD, CAD, hypothyroidism, HLD, and HTN presents from nursing home with report of difficulty breathing. Met sepsis criteria. WBC 18.2. CXR no active disease. Influenza and COVID negative.  Concern for aspiration, started on vancomycin and meropenem given allergy profile.    Interval Followup: No overnight events reported.  No fevers.  Blood pressure controlled.  Weaned down to 2 L/min oxygen this morning.  Patient resting in bed.  She appears comfortable.  She is able to voice to me that she needs to go to the bathroom.  She is incontinent at baseline.  She has been tolerating modified diet.  She was coughing intermittently during my exam.  Creatinine has been stable.  Potassium remains low.  Hemoglobin improved.  RN reports no issues.    Review of Systems  Unable to obtain due to dementia/nonverbal    Objective   Objective     Vitals:   Temp:  [97.5 °F (36.4 °C)-98.6 °F (37 °C)] 98.6 °F (37 °C)  Heart Rate:  [55-70] 59  Resp:  [18] 18  BP: (114-144)/(40-81) 144/67  Flow (L/min):  [2-4] 2  Physical Exam    Constitutional: Elderly  female, chronically ill appearing, awake in bed, nonconversant, NAD   Eyes: Pupils equal and reactive, no conjunctival injection   HENT: NCAT, nares patent, dry mucous membrane   Neck: Supple, trachea midline   Respiratory: Equal aeration with bilateral rhonchi, nonlabored   Cardiovascular: RRR, no murmurs, no pedal edema   Gastrointestinal: Positive bowel sounds, soft, nondistended   Musculoskeletal: No gross joint deformities, no clubbing or cyanosis to extremities   Neurologic: Nonverbal, alert, seems to track movements appropriately, moving all 4 extremity  spontaneously    Skin: Warm and dry, no rashes    Result Review    Result Review:  I have personally reviewed the results from the time of this admission to 5/3/2022 12:46 EDT and agree with these findings:  [x]  Laboratory   CBC    CBC 5/1/22 5/2/22 5/3/22   WBC 7.62 5.23 5.56   RBC 2.86 (A) 3.03 (A) 3.17 (A)   Hemoglobin 9.4 (A) 9.6 (A) 10.0 (A)   Hematocrit 28.8 (A) 30.6 (A) 33.1 (A)   .7 (A) 101.0 (A) 104.4 (A)   MCH 32.9 31.7 31.5   MCHC 32.6 31.4 (A) 30.2 (A)   RDW 14.6 14.3 14.2   Platelets 238 238 239   (A) Abnormal value            BMP    BMP 5/1/22 5/2/22 5/3/22   BUN 23 19 19   Creatinine 0.86 0.77 0.74   Sodium 144 142 143   Potassium 3.1 (A) 3.4 (A) 3.3 (A)   Chloride 109 (A) 110 (A) 112 (A)   CO2 25.2 25.0 21.6 (A)   Calcium 8.5 (A) 8.3 (A) 8.3 (A)   (A) Abnormal value              []  Microbiology  []  Radiology    []  EKG/Telemetry   []  Cardiology/Vascular   []  Pathology  []  Old records  [x]  Other:     Intake/Output Summary (Last 24 hours) at 5/3/2022 1246  Last data filed at 5/3/2022 1206  Gross per 24 hour   Intake 796 ml   Output 300 ml   Net 496 ml         Assessment/Plan   Assessment / Plan     Assessment/Plan:  Active Hospital Problems    Diagnosis  POA   • Aspiration pneumonia (HCC) [J69.0]  Unknown   • Seizure disorder ([facility]) [G40.909]  Yes   • Hypokalemia [E87.6]  Unknown   • COPD (chronic obstructive pulmonary disease) ([facility]) [J44.9]  Unknown   • Hypothyroidism [E03.9]  Yes   • Dementia ([facility]) [F03.90]  Yes   • Difficulty with speech [R47.9]  Yes   • Hypertension [I10]  Yes   • History of CVA (cerebrovascular accident) [Z86.73]  Not Applicable   • Hypophosphatemia [E83.39]  Unknown           Down to 2 L/min.  Continue to wean oxygen, SPO2 goal >90%.  No chronic oxygen use  Continue DuoNebs 4 times a day scheduled. Bronchopulmonary hygiene protocol  Continue Mucinex 600 mg twice daily    Blood cultures and urine cultures no growth  Will stop IV vancomycin.  Given allergy profile no  good option to de-escalate  Will complete an empiric 5-day course of IV meropenem, day 4    Increase p.o. potassium chloride to 40 mEq daily  Continue Keppra 500 mg q. 12 hours  Continue Synthroid 300 mcg daily  Continue home Aricept and Namenda  BP at goal.  We will discontinue HCTZ altogether; not a good option given age and electrolyte issues    Continue modified diet per SLP recs  Keep head of bed elevated  Aspiration precaution    Appreciate palliative care assistance    Continue Lovenox 40 mg q. daily for DVT ppx    Discussed plan with staff.     DVT prophylaxis:  Medical DVT prophylaxis orders are present.    CODE STATUS:   Code Status (Patient has no pulse and is not breathing): CPR (Attempt to Resuscitate)  Medical Interventions (Patient has pulse or is breathing): Full Support    Electronically signed by Tariq Hernandes DO, 05/03/22, 12:51 PM EDT.

## 2022-05-03 NOTE — PLAN OF CARE
Nutrition Note    Pt had MBS and diet upgraded to Puree/Honey Thick w/ 1: supervision/assist, previously NPO. 100% intake of dinner documented. Pt is only alert to self at baseline, not able to participate in meaningful conversation. Pt wt stable x 5 months per wt hx. RD will CTM and f/u per protocol.    ITZEL LINARES RD  09:44 EDT  05/03/22

## 2022-05-03 NOTE — THERAPY TREATMENT NOTE
Acute Care - Speech Language Pathology   Swallow Treatment Note  [facility]     Patient Name: Kezia Garcia  : 1940  MRN: [id number]  Today's Date: 5/3/2022               Admit Date: 2022    Visit Dx:     ICD-10-CM ICD-9-CM   1. Sepsis, due to unspecified organism, unspecified whether acute organ dysfunction present (HCC)  A41.9 038.9     995.91   2. Oropharyngeal dysphagia  R13.12 787.22   3. Decreased activities of daily living (ADL)  Z78.9 V49.89   4. Difficulty walking  R26.2 719.7     Patient Active Problem List   Diagnosis   • Sepsis, due to unspecified organism, unspecified whether acute organ dysfunction present (HCC)   • Seizure disorder (HCC)   • Hypokalemia   • COPD (chronic obstructive pulmonary disease) ([facility])   • Hypothyroidism   • Dementia ([facility])   • Difficulty with speech   • Hypertension   • History of CVA (cerebrovascular accident)   • Hypophosphatemia     Past Medical History:   Diagnosis Date   • Anemia    • COPD (chronic obstructive pulmonary disease) ([facility])    • Coronary artery disease    • Dementia ([facility])    • Disease of thyroid gland    • Hyperkalemia    • Hyperlipidemia    • Hypertension    • Seizures ([facility])      History reviewed. No pertinent surgical history.  SPEECH PATHOLOGY DYSPHAGIA TREATMENT    Subjective/Behavioral Observations: Awake and cooperative.  Modified barium swallow study completed on 2022.  Begin diet of honey thick liquids and purée solids.        Day/time of Treatment: 5/3/2022        Current Diet: Puréed solids, honey thickened liquids        Current Strategies: One-on-one assist for self-feeding, small bites, controlled drink, liquids by spoon, crushed meds in applesauce        Treatment received: Treatment focused on tolerance of current diet        Results of treatment: Patient consumed approximately 120 mL of honey thickened liquids by spoon and controlled cup drink.  Swallows completed with thin 10 seconds.  Moderate lingual pumping.  Purée solids with  moderate lingual pumping.  Patient consuming 80% of purée with assist for self-feeding and size control.        Progress toward goals: Begin p.o. diet, requires one-on-one assist for feeding for use of strategies        Barriers to Achieving goals: Previous level of function, age-related        Plan of care:/changes in plan: Continue per updated plan and diet recommendations                SLP Recommendation and Plan                                                                      Plan of Care Reviewed With: patient          EDUCATION  The patient has been educated in the following areas:   Dysphagia (Swallowing Impairment).              Time Calculation:    Time Calculation- SLP     Row Name 05/03/22 1154             Time Calculation- SLP    SLP Stop Time 1045  -SN      SLP Received On 05/03/22  -SN              Untimed Charges    39881-QH Treatment Swallow Minutes 45  -SN              Total Minutes    Untimed Charges Total Minutes 45  -SN       Total Minutes 45  -SN            User Key  (r) = Recorded By, (t) = Taken By, (c) = Cosigned By    Initials Name Provider Type    SN Patricia Herr SLP Speech and Language Pathologist                Therapy Charges for Today     Code Description Service Date Service Provider Modifiers Qty    51507919668 HC ST TREATMENT SWALLOW 3 5/2/2022 Patricia Herr SLP GN 1    65687632276 HC ST MOTION FLUORO EVAL SWALLOW 6 5/2/2022 Patricia Herr SLP GN 1    04258236635 HC ST TREATMENT SWALLOW 3 5/3/2022 Patricia Herr SLP GN 1               LAILA Overton  5/3/2022

## 2022-05-04 VITALS
WEIGHT: 164.02 LBS | SYSTOLIC BLOOD PRESSURE: 148 MMHG | TEMPERATURE: 97.9 F | OXYGEN SATURATION: 94 % | BODY MASS INDEX: 30.18 KG/M2 | DIASTOLIC BLOOD PRESSURE: 47 MMHG | HEART RATE: 59 BPM | RESPIRATION RATE: 18 BRPM | HEIGHT: 62 IN

## 2022-05-04 PROBLEM — E87.6 HYPOKALEMIA: Status: RESOLVED | Noted: 2022-05-01 | Resolved: 2022-05-04

## 2022-05-04 PROBLEM — J69.0 ASPIRATION PNEUMONIA (HCC): Status: RESOLVED | Noted: 2022-05-03 | Resolved: 2022-05-04

## 2022-05-04 LAB
ANION GAP SERPL CALCULATED.3IONS-SCNC: 9.1 MMOL/L (ref 5–15)
BUN SERPL-MCNC: 21 MG/DL (ref 8–23)
BUN/CREAT SERPL: 29.6 (ref 7–25)
CALCIUM SPEC-SCNC: 8.5 MG/DL (ref 8.6–10.5)
CHLORIDE SERPL-SCNC: 114 MMOL/L (ref 98–107)
CO2 SERPL-SCNC: 17.9 MMOL/L (ref 22–29)
CREAT SERPL-MCNC: 0.71 MG/DL (ref 0.57–1)
DEPRECATED RDW RBC AUTO: 58.1 FL (ref 37–54)
EGFRCR SERPLBLD CKD-EPI 2021: 86.6 ML/MIN/1.73
ERYTHROCYTE [DISTWIDTH] IN BLOOD BY AUTOMATED COUNT: 14.6 % (ref 12.3–15.4)
GLUCOSE SERPL-MCNC: 98 MG/DL (ref 65–99)
HCT VFR BLD AUTO: 33.9 % (ref 34–46.6)
HGB BLD-MCNC: 10.2 G/DL (ref 12–15.9)
MCH RBC QN AUTO: 32.3 PG (ref 26.6–33)
MCHC RBC AUTO-ENTMCNC: 30.1 G/DL (ref 31.5–35.7)
MCV RBC AUTO: 107.3 FL (ref 79–97)
PLATELET # BLD AUTO: 225 10*3/MM3 (ref 140–450)
PMV BLD AUTO: 11 FL (ref 6–12)
POTASSIUM SERPL-SCNC: 4 MMOL/L (ref 3.5–5.2)
RBC # BLD AUTO: 3.16 10*6/MM3 (ref 3.77–5.28)
SODIUM SERPL-SCNC: 141 MMOL/L (ref 136–145)
WBC NRBC COR # BLD: 6.42 10*3/MM3 (ref 3.4–10.8)

## 2022-05-04 PROCEDURE — 25010000002 ENOXAPARIN PER 10 MG: Performed by: INTERNAL MEDICINE

## 2022-05-04 PROCEDURE — 94640 AIRWAY INHALATION TREATMENT: CPT

## 2022-05-04 PROCEDURE — 94799 UNLISTED PULMONARY SVC/PX: CPT

## 2022-05-04 PROCEDURE — 80048 BASIC METABOLIC PNL TOTAL CA: CPT | Performed by: INTERNAL MEDICINE

## 2022-05-04 PROCEDURE — 85027 COMPLETE CBC AUTOMATED: CPT | Performed by: INTERNAL MEDICINE

## 2022-05-04 PROCEDURE — 99239 HOSP IP/OBS DSCHRG MGMT >30: CPT | Performed by: INTERNAL MEDICINE

## 2022-05-04 PROCEDURE — 25010000002 MEROPENEM PER 100 MG: Performed by: INTERNAL MEDICINE

## 2022-05-04 RX ORDER — ALBUTEROL SULFATE 1.25 MG/3ML
1 SOLUTION RESPIRATORY (INHALATION) EVERY 6 HOURS PRN
Refills: 12 | Status: CANCELLED
Start: 2022-05-04

## 2022-05-04 RX ORDER — FUROSEMIDE 20 MG/1
20 TABLET ORAL DAILY
Start: 2022-05-05 | End: 2022-05-08

## 2022-05-04 RX ORDER — GUAIFENESIN 600 MG/1
1200 TABLET, EXTENDED RELEASE ORAL 2 TIMES DAILY
Start: 2022-05-04 | End: 2022-08-29

## 2022-05-04 RX ORDER — AMLODIPINE BESYLATE 5 MG/1
2.5 TABLET ORAL DAILY
Status: ON HOLD
Start: 2022-05-04 | End: 2022-09-01 | Stop reason: SDUPTHER

## 2022-05-04 RX ADMIN — GUAIFENESIN 600 MG: 600 TABLET ORAL at 20:02

## 2022-05-04 RX ADMIN — Medication 10 ML: at 20:03

## 2022-05-04 RX ADMIN — LEVETIRACETAM 500 MG: 500 TABLET, FILM COATED ORAL at 20:02

## 2022-05-04 RX ADMIN — MEROPENEM 1 G: 1 INJECTION, POWDER, FOR SOLUTION INTRAVENOUS at 05:12

## 2022-05-04 RX ADMIN — DONEPEZIL HYDROCHLORIDE 10 MG: 10 TABLET ORAL at 20:02

## 2022-05-04 RX ADMIN — MEMANTINE 10 MG: 10 TABLET ORAL at 20:02

## 2022-05-04 RX ADMIN — MEMANTINE 10 MG: 10 TABLET ORAL at 09:25

## 2022-05-04 RX ADMIN — POTASSIUM CHLORIDE 40 MEQ: 750 CAPSULE, EXTENDED RELEASE ORAL at 09:25

## 2022-05-04 RX ADMIN — MEROPENEM 1 G: 1 INJECTION, POWDER, FOR SOLUTION INTRAVENOUS at 13:21

## 2022-05-04 RX ADMIN — LEVETIRACETAM 500 MG: 500 TABLET, FILM COATED ORAL at 09:25

## 2022-05-04 RX ADMIN — LATANOPROST 1 DROP: 50 SOLUTION OPHTHALMIC at 03:23

## 2022-05-04 RX ADMIN — LEVOTHYROXINE SODIUM 300 MCG: 0.15 TABLET ORAL at 05:13

## 2022-05-04 RX ADMIN — GUAIFENESIN 600 MG: 600 TABLET ORAL at 09:25

## 2022-05-04 RX ADMIN — ENOXAPARIN SODIUM 40 MG: 100 INJECTION SUBCUTANEOUS at 20:03

## 2022-05-04 RX ADMIN — ALBUTEROL SULFATE 2.5 MG: 2.5 SOLUTION RESPIRATORY (INHALATION) at 14:06

## 2022-05-04 RX ADMIN — Medication 10 ML: at 09:26

## 2022-05-04 NOTE — PROGRESS NOTES
[facility]   Hospitalist Progress Note  Date: 2022  Patient Name: Kezia Garcia  : 1940  MRN: [id number]  Date of admission: 2022      Subjective   Subjective     Chief Complaint: Follow up for difficulty breathing    Summary: 82 y/o F with history of CVA, with  shunt and seizure history, dementia COPD, CAD, hypothyroidism, HLD, and HTN presents from nursing home with report of difficulty breathing. Met sepsis criteria. WBC 18.2. CXR no active disease. Influenza and COVID negative.  Concern for aspiration, started on vancomycin and meropenem given allergy profile.    Interval Followup: No issues overnight.  No fevers.  Up in bed, eating breakfast with assistance.  RN reports no issues from her standpoint.  I weaned her down to 1 L of oxygen this morning and she seems to be doing well.    Review of Systems  Unable to obtain due to dementia/nonverbal    Objective   Objective     Vitals:   Temp:  [97.7 °F (36.5 °C)-98.2 °F (36.8 °C)] 97.9 °F (36.6 °C)  Heart Rate:  [57-68] 63  Resp:  [18] 18  BP: (122-156)/(47-68) 145/67  Flow (L/min):  [1] 1  Physical Exam    Constitutional: Elderly  female, chronically ill appearing, up in bed, eating breakfast with assistance   Eyes: Pupils equal and reactive, no conjunctival injection   HENT: NCAT, nares patent, dry mucous membrane   Neck: Supple, trachea midline   Respiratory: Equal aeration with bilateral rhonchi, nonlabored   Cardiovascular: RRR, no murmurs, no pedal edema   Gastrointestinal: Positive bowel sounds, soft, nondistended   Musculoskeletal: No gross joint deformities, no clubbing or cyanosis to extremities   Neurologic: Nonverbal, alert, seems to track movements appropriately, moving all 4 extremity spontaneously    Skin: Warm and dry, no rashes    Result Review    Result Review:  I have personally reviewed the results from the time of this admission to 2022 12:14 EDT and agree with these findings:  [x]  Laboratory   CBC    CBC  5/2/22 5/3/22 5/4/22   WBC 5.23 5.56 6.42   RBC 3.03 (A) 3.17 (A) 3.16 (A)   Hemoglobin 9.6 (A) 10.0 (A) 10.2 (A)   Hematocrit 30.6 (A) 33.1 (A) 33.9 (A)   .0 (A) 104.4 (A) 107.3 (A)   MCH 31.7 31.5 32.3   MCHC 31.4 (A) 30.2 (A) 30.1 (A)   RDW 14.3 14.2 14.6   Platelets 238 239 225   (A) Abnormal value            BMP    BMP 5/2/22 5/3/22 5/4/22   BUN 19 19 21   Creatinine 0.77 0.74 0.71   Sodium 142 143 141   Potassium 3.4 (A) 3.3 (A) 4.0   Chloride 110 (A) 112 (A) 114 (A)   CO2 25.0 21.6 (A) 17.9 (A)   Calcium 8.3 (A) 8.3 (A) 8.5 (A)   (A) Abnormal value       Comments are available for some flowsheets but are not being displayed.             []  Microbiology  []  Radiology    []  EKG/Telemetry   []  Cardiology/Vascular   []  Pathology  []  Old records  [x]  Other:     Intake/Output Summary (Last 24 hours) at 5/4/2022 1214  Last data filed at 5/3/2022 1732  Gross per 24 hour   Intake 220 ml   Output --   Net 220 ml         Assessment/Plan   Assessment / Plan     Assessment/Plan:  Active Hospital Problems    Diagnosis  POA   • Aspiration pneumonia (HCC) [J69.0]  Unknown   • Seizure disorder (HCC) [G40.909]  Yes   • Hypokalemia [E87.6]  Unknown   • COPD (chronic obstructive pulmonary disease) (HCC) [J44.9]  Unknown   • Hypothyroidism [E03.9]  Yes   • Dementia (HCC) [F03.90]  Yes   • Difficulty with speech [R47.9]  Yes   • Hypertension [I10]  Yes   • History of CVA (cerebrovascular accident) [Z86.73]  Not Applicable   • Hypophosphatemia [E83.39]  Unknown         I feel Ms Garcia is stable for discharge and looks to be near her baseline  Continue to wean supplemental oxygen, SPO2 goal >90%  Continue nebulizers  Complete empiric course of meropenem today  Continue Keppra 500 mg q. 12 hours  Continue Synthroid 300 mcg daily  Continue home Aricept and Namenda  Continue modified diet per SLP recs. Aspiration precaution  Anticipate discharge back to [facility]    DVT prophylaxis:  Medical DVT prophylaxis  orders are present.    CODE STATUS:   Code Status (Patient has no pulse and is not breathing): CPR (Attempt to Resuscitate)  Medical Interventions (Patient has pulse or is breathing): Full Support    Electronically signed by Tariq Hernandes DO, 05/04/22, 12:14 PM EDT.

## 2022-05-04 NOTE — SIGNIFICANT NOTE
05/04/22 1125   Plan   Plan Comments Per MD pt will discharge back to [facility] today. SW notified facility.   Final Discharge Disposition Code 04 - intermediate care facility

## 2022-05-04 NOTE — DISCHARGE SUMMARY
[facility]         HOSPITALIST  DISCHARGE SUMMARY    Patient Name: Kezia Garcia  : 1940  MRN: [id number]    Date of Admission: 2022  Date of Discharge:  22  Primary Care Physician: Laurent Blanco MD    Consults     Date and Time Order Name Status Description    2022  7:14 PM Hospitalist (on-call MD unless specified)            Active and Resolved Hospital Problems:  Active Hospital Problems    Diagnosis POA   • Seizure disorder ([facility]) [G40.909] Yes   • COPD (chronic obstructive pulmonary disease) ([facility]) [J44.9] Unknown   • Hypothyroidism [E03.9] Yes   • Dementia ([facility]) [F03.90] Yes   • Difficulty with speech [R47.9] Yes   • Hypertension [I10] Yes   • History of CVA (cerebrovascular accident) [Z86.73] Not Applicable   • Hypophosphatemia [E83.39] Unknown      Resolved Hospital Problems    Diagnosis POA   • **Sepsis, due to unspecified organism, unspecified whether acute organ dysfunction present ([facility]) [A41.9] Yes   • Aspiration pneumonia ([facility]) [J69.0] Unknown   • Hypokalemia [E87.6] Unknown       Hospital Course     Hospital Course:  Kezia Garcia is a 81 y.o. female CVA, with  shunt and seizure history, dementia COPD, CAD, hypothyroidism, HLD, and HTN presented from nursing home with shortness of air. Met sepsis criteria. WBC 18.2. CXR no active disease. Influenza and COVID negative. Concern for aspiration, started on vancomycin and meropenem given allergy profile.  Chest x-ray no clear infiltrate.  Speech therapy evaluated; underwent modified barium swallow advance to puréed with honey thick she tolerated.  Blood and urine cultures showed no growth.  COVID-negative.  Leukocytosis resolved.  Remained afebrile.  Completed empiric 5-day course of antibiotic.  Weaned down to 1-2 L of oxygen.  Low-dose Lasix added daily for 5 days.  Continued on albuterol nebs and Mucinex increase.  HCTZ was discontinued given age and side effect profile.  Switched to low-dose  amlodipine for blood pressure control.   She was felt stable for discharge and was sent back to her long-term facility.  Should follow-up with her family doctor as needed.    DISCHARGE Follow Up Recommendations for labs and diagnostics:   Recommend BMP in 5 days      Day of Discharge     Vital Signs:  Temp:  [97.5 °F (36.4 °C)-98.1 °F (36.7 °C)] 97.5 °F (36.4 °C)  Heart Rate:  [57-63] 60  Resp:  [18-20] 20  BP: (122-156)/(47-67) 148/60  Flow (L/min):  [0.5-3] 3  Physical Exam:   GENERAL: The patient is elderly, chronically ill, nontoxic  HEENT: PERRLA, EOMI. Oropharynx clear. Moist mucous membranes.   NECK: Supple, trachea midline   HEART: Regular rate and rhythm, trace edema  LUNGS: Good aeration, bilateral rhonchi, nonlabored  ABDOMEN: Soft, positive bowel sounds, nontender, nondistended  SKIN: No rash or open wounds   NEUROLOGIC: Nonverbal, alert, seems to track movements appropriately, moving all 4 extremity spontaneously     Discharge Details        Discharge Medications      New Medications      Instructions Start Date   amLODIPine 5 MG tablet  Commonly known as: NORVASC   2.5 mg, Oral, Daily      furosemide 20 MG tablet  Commonly known as: Lasix   20 mg, Oral, Daily   Start Date: May 5, 2022        Changes to Medications      Instructions Start Date   guaiFENesin 600 MG 12 hr tablet  Commonly known as: MUCINEX  What changed: how much to take   1,200 mg, Oral, 2 Times Daily         Continue These Medications      Instructions Start Date   acetaminophen 500 MG tablet  Commonly known as: TYLENOL   500 mg, Oral, Every 6 Hours PRN      albuterol (2.5 MG/3ML) 0.083% nebulizer solution  Commonly known as: PROVENTIL   2.5 mg, Nebulization, Every 4 Hours PRN      donepezil 10 MG tablet  Commonly known as: ARICEPT   10 mg, Oral, Nightly      fenofibrate 160 MG tablet   160 mg, Oral, Daily      Flax Seed Oil 1000 MG capsule   1 tablet, Oral, Daily      ketoconazole 2 % cream  Commonly known as: NIZORAL   1 application,  Topical, 2 Times Daily, To back left shoulder      latanoprost 0.005 % ophthalmic solution  Commonly known as: XALATAN   1 drop, Both Eyes, Nightly      levETIRAcetam 500 MG tablet  Commonly known as: KEPPRA   500 mg, Oral, 2 Times Daily      levothyroxine 300 MCG tablet  Commonly known as: SYNTHROID, LEVOTHROID   300 mcg, Oral, Daily      Lidocaine HCl 4 % cream  Commonly known as: LMX   1 application, Topical, 2 Times Daily, To lower back      loperamide 2 MG capsule  Commonly known as: IMODIUM   2 mg, Oral, 4 Times Daily PRN      memantine 10 MG tablet  Commonly known as: NAMENDA   10 mg, Oral, 2 Times Daily         Stop These Medications    hydroCHLOROthiazide 25 MG tablet  Commonly known as: HYDRODIURIL     sodium chloride 1 g tablet            Allergies   Allergen Reactions   • Erythromycin Anaphylaxis   • Bacitracin Unknown - High Severity   • Cefazolin Unknown - High Severity   • Cephalosporins Unknown - High Severity   • Neomycin Unknown - High Severity   • Penicillins Unknown - High Severity   • Polymox [Amoxicillin] Unknown - High Severity   • Prednisone Unknown - High Severity   • Quinolones Unknown - High Severity   • Statins Unknown - High Severity   • Sulfa Antibiotics Unknown - High Severity   • Trimethoprim Unknown - High Severity       Discharge Disposition:  Skilled Nursing Facility (DC - External)    Diet:  Hospital:  Diet Order   Procedures   • Diet Pureed; Honey Thick       Discharge Activity:   Activity Instructions     Activity as Tolerated            CODE STATUS:  Code Status and Medical Interventions:   Ordered at: 04/30/22 2026     Code Status (Patient has no pulse and is not breathing):    CPR (Attempt to Resuscitate)     Medical Interventions (Patient has pulse or is breathing):    Full Support         No future appointments.    Additional Instructions for the Follow-ups that You Need to Schedule     Discharge Follow-up with PCP   As directed       Currently Documented PCP:    Bella  Laurent Silver MD    PCP Phone Number:    [phone]     Follow Up Details: AS NEEDED               Pertinent  and/or Most Recent Results     PROCEDURES:   IMAGING:  FL Video Swallow With Speech Single Contrast    Result Date: 5/2/2022  PROCEDURE: FL VIDEO SWALLOW W SPEECH SINGLE-CONTRAST  COMPARISON: [facility], , FL VIDEO SWALLOW W SPEECH SINGLE-CONTRAST, 6/18/2021, 13:00.  INDICATIONS: aspiration, fluoro time 1.4 minutes, 8.4 mGy  TECHNIQUE: Examination was performed in conjunction with the speech pathologist. The patient was given both thin and thick liquid barium, applesauce with barium, contrast and lenka cracker with Esophotrast. The patient's medication list was reviewed and documented in the medical record.  FINDINGS:  No penetration or aspiration was identified with any consistency.        1. No evidence of penetration or aspiration. 2. Please refer to speech pathology report for further details.     NEGAR JARAMILLO MD       Electronically Signed and Approved By: NEGAR JARAMILLO MD on 5/02/2022 at 14:01             XR Chest 1 View    Result Date: 4/30/2022  PROCEDURE: XR CHEST 1 VW  COMPARISON: [facility], , XR CHEST 1 VW, 3/05/2022, 19:44.  INDICATIONS: SOA Triage Protocol  FINDINGS:  The heart is normal in size.  The lungs are well-expanded and free of infiltrates.   Precordial loop recorder is evident.  Shunt tubing is seen over the right hemithorax.  Bony structures have an osteopenic appearance.       No active cardiopulmonary disease is seen.       TEE TRONCOSO MD       Electronically Signed and Approved By: TEE TRONCOSO MD on 4/30/2022 at 15:02               LAB RESULTS:      Lab 05/04/22  0555 05/03/22  0509 05/02/22  1143 05/01/22  1308 04/30/22  1632 04/30/22  1406 04/30/22  1343   WBC 6.42 5.56 5.23 7.62  --   --  18.19*   HEMOGLOBIN 10.2* 10.0* 9.6* 9.4*  --   --  12.2   HEMATOCRIT 33.9* 33.1* 30.6* 28.8*  --   --  37.6   PLATELETS 225 239 238 238  --    --  278   NEUTROS ABS  --   --   --  5.27  --   --  17.01*   IMMATURE GRANS (ABS)  --   --   --  0.02  --   --  0.11*   LYMPHS ABS  --   --   --  1.42  --   --  0.39*   MONOS ABS  --   --   --  0.68  --   --  0.62   EOS ABS  --   --   --  0.21  --   --  0.01   .3* 104.4* 101.0* 100.7*  --   --  100.0*   PROCALCITONIN  --   --   --   --  0.10  --   --    LACTATE  --   --   --   --   --  2.0  --          Lab 05/04/22  0555 05/03/22  0509 05/02/22  1143 05/01/22  1308 04/30/22  1632   SODIUM 141 143 142 144 142   POTASSIUM 4.0 3.3* 3.4* 3.1* 3.4*   CHLORIDE 114* 112* 110* 109* 103   CO2 17.9* 21.6* 25.0 25.2 25.8   ANION GAP 9.1 9.4 7.0 9.8 13.2   BUN 21 19 19 23 30*   CREATININE 0.71 0.74 0.77 0.86 1.10*   EGFR 86.6 82.4 78.6 68.8 51.2*   GLUCOSE 98 96 107* 94 124*   CALCIUM 8.5* 8.3* 8.3* 8.5* 9.5   MAGNESIUM  --   --   --  2.1  --    PHOSPHORUS  --   --   --  2.3*  --    TSH  --   --   --  1.250  --          Lab 05/01/22  1308 04/30/22  1632   TOTAL PROTEIN 6.8 8.1   ALBUMIN 3.40* 4.20   GLOBULIN 3.4 3.9   ALT (SGPT) 17 19   AST (SGOT) 22 27   BILIRUBIN 0.3 0.4   ALK PHOS 34* 39         Lab 04/30/22  1632 04/30/22  1406   PROBNP  --  739.5   TROPONIN T <0.010  --                  Brief Urine Lab Results  (Last result in the past 365 days)      Color   Clarity   Blood   Leuk Est   Nitrite   Protein   CREAT   Urine HCG        04/30/22 2104 Yellow   Clear   Negative   Trace   Negative   30 mg/dL (1+)               Microbiology Results (last 10 days)     Procedure Component Value - Date/Time    MRSA Screen, PCR (Inpatient) - Swab, Nares [[id number]]  (Abnormal) Collected: 05/02/22 1252    Lab Status: Final result Specimen: Swab from Nares Updated: 05/02/22 1625     MRSA PCR MRSA Detected    Urine Culture - Urine, Urine, Catheter [[id number]]  (Normal) Collected: 04/30/22 2104    Lab Status: Final result Specimen: Urine, Catheter Updated: 05/01/22 2006     Urine Culture No growth    Influenza Antigen, Rapid - Swab,  Nasopharynx [[id number]]  (Normal) Collected: 04/30/22 1447    Lab Status: Final result Specimen: Swab from Nasopharynx Updated: 04/30/22 1546     Influenza A Ag, EIA Negative     Influenza B Ag, EIA Negative    COVID-19,APTIMA PANTHER(USHA),[facility], NP/OP SWAB IN UTM/VTM/[facility] TRANSPORT MEDIA,24 HR TAT - Swab, Nasopharynx [[id number]]  (Normal) Collected: 04/30/22 1447    Lab Status: Final result Specimen: Swab from Nasopharynx Updated: 04/30/22 2152     COVID19 Not Detected    Narrative:      Fact sheet for providers: https://www.fda.gov/media/977077/download     Fact sheet for patients: https://www.fda.gov/media/956666/download    Test performed by RT PCR.    Blood Culture - Blood, Arm, Left [[id number]]  (Normal) Collected: 04/30/22 1429    Lab Status: Preliminary result Specimen: Blood from Arm, Left Updated: 05/04/22 1503     Blood Culture No growth at 4 days    Blood Culture - Blood, Arm, Left [[id number]]  (Normal) Collected: 04/30/22 1429    Lab Status: Preliminary result Specimen: Blood from Arm, Left Updated: 05/04/22 1503     Blood Culture No growth at 4 days          FL Video Swallow With Speech Single Contrast    Result Date: 5/2/2022  Impression:   1. No evidence of penetration or aspiration. 2. Please refer to speech pathology report for further details.     NEGAR JARAMILLO MD       Electronically Signed and Approved By: NEGAR JARAMILLO MD on 5/02/2022 at 14:01             XR Chest 1 View    Result Date: 4/30/2022  Impression:  No active cardiopulmonary disease is seen.       TEE TRONCOSO MD       Electronically Signed and Approved By: TEE TRONCOSO MD on 4/30/2022 at 15:02                           Labs Pending at Discharge:  Pending Labs     Order Current Status    Blood Culture - Blood, Arm, Left Preliminary result    Blood Culture - Blood, Arm, Left Preliminary result          Time spent on Discharge including face to face service:  >30 minutes    Electronically signed by Tariq Hernandes DO,  05/04/22, 5:41 PM EDT.

## 2022-05-04 NOTE — PLAN OF CARE
Goal Outcome Evaluation:  Plan of Care Reviewed With: patient Patient has been sleep all nieht no chanes on this shift,

## 2022-05-04 NOTE — PLAN OF CARE
Goal Outcome Evaluation:  Plan of Care Reviewed With: daughter, patient        Progress: no change   VSS. Pt has done very well this shift. She has eaten most of her meals throughout the day with out coughing or any signs of aspiration. She did rest more this shift than yesterday but does not have any indication of pain or discomfort. Daughter will visit this evening.

## 2022-05-05 LAB
BACTERIA SPEC AEROBE CULT: NORMAL
BACTERIA SPEC AEROBE CULT: NORMAL

## 2022-05-08 ENCOUNTER — APPOINTMENT (OUTPATIENT)
Dept: CT IMAGING | Facility: HOSPITAL | Age: 82
End: 2022-05-08

## 2022-05-08 ENCOUNTER — APPOINTMENT (OUTPATIENT)
Dept: GENERAL RADIOLOGY | Facility: HOSPITAL | Age: 82
End: 2022-05-08

## 2022-05-08 ENCOUNTER — HOSPITAL ENCOUNTER (INPATIENT)
Facility: HOSPITAL | Age: 82
LOS: 2 days | Discharge: NURSING, REHAB OR LONG-TERM CARE | End: 2022-05-10
Attending: EMERGENCY MEDICINE | Admitting: FAMILY MEDICINE

## 2022-05-08 DIAGNOSIS — J18.9 PNEUMONIA DUE TO INFECTIOUS ORGANISM, UNSPECIFIED LATERALITY, UNSPECIFIED PART OF LUNG: Primary | ICD-10-CM

## 2022-05-08 DIAGNOSIS — J96.01 ACUTE RESPIRATORY FAILURE WITH HYPOXIA: ICD-10-CM

## 2022-05-08 DIAGNOSIS — A41.9 SEPSIS, DUE TO UNSPECIFIED ORGANISM, UNSPECIFIED WHETHER ACUTE ORGAN DYSFUNCTION PRESENT: ICD-10-CM

## 2022-05-08 DIAGNOSIS — R13.12 OROPHARYNGEAL DYSPHAGIA: ICD-10-CM

## 2022-05-08 LAB
ALBUMIN SERPL-MCNC: 3.5 G/DL (ref 3.5–5.2)
ALBUMIN/GLOB SERPL: 0.9 G/DL
ALP SERPL-CCNC: 45 U/L (ref 39–117)
ALT SERPL W P-5'-P-CCNC: 13 U/L (ref 1–33)
ANION GAP SERPL CALCULATED.3IONS-SCNC: 10.3 MMOL/L (ref 5–15)
ANION GAP SERPL CALCULATED.3IONS-SCNC: 12.5 MMOL/L (ref 5–15)
AST SERPL-CCNC: 15 U/L (ref 1–32)
BASOPHILS # BLD AUTO: 0.02 10*3/MM3 (ref 0–0.2)
BASOPHILS # BLD AUTO: 0.04 10*3/MM3 (ref 0–0.2)
BASOPHILS NFR BLD AUTO: 0.3 % (ref 0–1.5)
BASOPHILS NFR BLD AUTO: 0.4 % (ref 0–1.5)
BILIRUB SERPL-MCNC: 0.3 MG/DL (ref 0–1.2)
BUN SERPL-MCNC: 17 MG/DL (ref 8–23)
BUN SERPL-MCNC: 19 MG/DL (ref 8–23)
BUN/CREAT SERPL: 20.9 (ref 7–25)
BUN/CREAT SERPL: 21.5 (ref 7–25)
CALCIUM SPEC-SCNC: 8.6 MG/DL (ref 8.6–10.5)
CALCIUM SPEC-SCNC: 9.1 MG/DL (ref 8.6–10.5)
CHLORIDE SERPL-SCNC: 106 MMOL/L (ref 98–107)
CHLORIDE SERPL-SCNC: 108 MMOL/L (ref 98–107)
CO2 SERPL-SCNC: 21.5 MMOL/L (ref 22–29)
CO2 SERPL-SCNC: 22.7 MMOL/L (ref 22–29)
CREAT SERPL-MCNC: 0.79 MG/DL (ref 0.57–1)
CREAT SERPL-MCNC: 0.91 MG/DL (ref 0.57–1)
D DIMER PPP FEU-MCNC: 1 MCGFEU/ML (ref 0–0.57)
D-LACTATE SERPL-SCNC: 2 MMOL/L (ref 0.5–2)
DEPRECATED RDW RBC AUTO: 50.5 FL (ref 37–54)
DEPRECATED RDW RBC AUTO: 50.7 FL (ref 37–54)
EGFRCR SERPLBLD CKD-EPI 2021: 64.3 ML/MIN/1.73
EGFRCR SERPLBLD CKD-EPI 2021: 76.2 ML/MIN/1.73
EOSINOPHIL # BLD AUTO: 0.06 10*3/MM3 (ref 0–0.4)
EOSINOPHIL # BLD AUTO: 0.17 10*3/MM3 (ref 0–0.4)
EOSINOPHIL NFR BLD AUTO: 0.8 % (ref 0.3–6.2)
EOSINOPHIL NFR BLD AUTO: 1.8 % (ref 0.3–6.2)
ERYTHROCYTE [DISTWIDTH] IN BLOOD BY AUTOMATED COUNT: 14 % (ref 12.3–15.4)
ERYTHROCYTE [DISTWIDTH] IN BLOOD BY AUTOMATED COUNT: 14.2 % (ref 12.3–15.4)
GLOBULIN UR ELPH-MCNC: 3.7 GM/DL
GLUCOSE SERPL-MCNC: 130 MG/DL (ref 65–99)
GLUCOSE SERPL-MCNC: 92 MG/DL (ref 65–99)
HCT VFR BLD AUTO: 31.2 % (ref 34–46.6)
HCT VFR BLD AUTO: 32.3 % (ref 34–46.6)
HGB BLD-MCNC: 10.2 G/DL (ref 12–15.9)
HGB BLD-MCNC: 10.6 G/DL (ref 12–15.9)
HOLD SPECIMEN: NORMAL
HOLD SPECIMEN: NORMAL
IMM GRANULOCYTES # BLD AUTO: 0.05 10*3/MM3 (ref 0–0.05)
IMM GRANULOCYTES # BLD AUTO: 0.11 10*3/MM3 (ref 0–0.05)
IMM GRANULOCYTES NFR BLD AUTO: 0.6 % (ref 0–0.5)
IMM GRANULOCYTES NFR BLD AUTO: 1.1 % (ref 0–0.5)
INR PPP: 1.07 (ref 0.86–1.15)
LYMPHOCYTES # BLD AUTO: 2.04 10*3/MM3 (ref 0.7–3.1)
LYMPHOCYTES # BLD AUTO: 2.27 10*3/MM3 (ref 0.7–3.1)
LYMPHOCYTES NFR BLD AUTO: 23.6 % (ref 19.6–45.3)
LYMPHOCYTES NFR BLD AUTO: 25.7 % (ref 19.6–45.3)
M PNEUMO IGM SER QL: NEGATIVE
MAGNESIUM SERPL-MCNC: 2 MG/DL (ref 1.6–2.4)
MCH RBC QN AUTO: 32.1 PG (ref 26.6–33)
MCH RBC QN AUTO: 32.1 PG (ref 26.6–33)
MCHC RBC AUTO-ENTMCNC: 32.7 G/DL (ref 31.5–35.7)
MCHC RBC AUTO-ENTMCNC: 32.8 G/DL (ref 31.5–35.7)
MCV RBC AUTO: 97.9 FL (ref 79–97)
MCV RBC AUTO: 98.1 FL (ref 79–97)
MONOCYTES # BLD AUTO: 0.45 10*3/MM3 (ref 0.1–0.9)
MONOCYTES # BLD AUTO: 0.56 10*3/MM3 (ref 0.1–0.9)
MONOCYTES NFR BLD AUTO: 5.7 % (ref 5–12)
MONOCYTES NFR BLD AUTO: 5.8 % (ref 5–12)
NEUTROPHILS NFR BLD AUTO: 5.31 10*3/MM3 (ref 1.7–7)
NEUTROPHILS NFR BLD AUTO: 6.45 10*3/MM3 (ref 1.7–7)
NEUTROPHILS NFR BLD AUTO: 66.9 % (ref 42.7–76)
NEUTROPHILS NFR BLD AUTO: 67.3 % (ref 42.7–76)
NRBC BLD AUTO-RTO: 0 /100 WBC (ref 0–0.2)
NRBC BLD AUTO-RTO: 0 /100 WBC (ref 0–0.2)
NT-PROBNP SERPL-MCNC: 816.1 PG/ML (ref 0–1800)
PHOSPHATE SERPL-MCNC: 3.2 MG/DL (ref 2.5–4.5)
PLATELET # BLD AUTO: 271 10*3/MM3 (ref 140–450)
PLATELET # BLD AUTO: 307 10*3/MM3 (ref 140–450)
PMV BLD AUTO: 10.5 FL (ref 6–12)
PMV BLD AUTO: 11.1 FL (ref 6–12)
POTASSIUM SERPL-SCNC: 3.4 MMOL/L (ref 3.5–5.2)
POTASSIUM SERPL-SCNC: 3.6 MMOL/L (ref 3.5–5.2)
PROCALCITONIN SERPL-MCNC: 0.05 NG/ML (ref 0–0.25)
PROT SERPL-MCNC: 7.2 G/DL (ref 6–8.5)
PROTHROMBIN TIME: 14 SECONDS (ref 11.8–14.9)
QT INTERVAL: 413 MS
RBC # BLD AUTO: 3.18 10*6/MM3 (ref 3.77–5.28)
RBC # BLD AUTO: 3.3 10*6/MM3 (ref 3.77–5.28)
S PNEUM AG SPEC QL LA: NEGATIVE
SARS-COV-2 RNA PNL SPEC NAA+PROBE: NOT DETECTED
SODIUM SERPL-SCNC: 140 MMOL/L (ref 136–145)
SODIUM SERPL-SCNC: 141 MMOL/L (ref 136–145)
TROPONIN T SERPL-MCNC: <0.01 NG/ML (ref 0–0.03)
WBC NRBC COR # BLD: 7.93 10*3/MM3 (ref 3.4–10.8)
WBC NRBC COR # BLD: 9.6 10*3/MM3 (ref 3.4–10.8)
WHOLE BLOOD HOLD SPECIMEN: NORMAL
WHOLE BLOOD HOLD SPECIMEN: NORMAL

## 2022-05-08 PROCEDURE — 85379 FIBRIN DEGRADATION QUANT: CPT | Performed by: INTERNAL MEDICINE

## 2022-05-08 PROCEDURE — 86713 LEGIONELLA ANTIBODY: CPT | Performed by: GENERAL PRACTICE

## 2022-05-08 PROCEDURE — 25010000002 LEVETIRACETAM IN NACL 0.82% 500 MG/100ML SOLUTION: Performed by: PHYSICIAN ASSISTANT

## 2022-05-08 PROCEDURE — 84145 PROCALCITONIN (PCT): CPT | Performed by: INTERNAL MEDICINE

## 2022-05-08 PROCEDURE — 84484 ASSAY OF TROPONIN QUANT: CPT | Performed by: EMERGENCY MEDICINE

## 2022-05-08 PROCEDURE — 87278 LEGION PNEUMOPHILIA AG IF: CPT | Performed by: GENERAL PRACTICE

## 2022-05-08 PROCEDURE — 94640 AIRWAY INHALATION TREATMENT: CPT

## 2022-05-08 PROCEDURE — 86738 MYCOPLASMA ANTIBODY: CPT | Performed by: GENERAL PRACTICE

## 2022-05-08 PROCEDURE — 87040 BLOOD CULTURE FOR BACTERIA: CPT | Performed by: EMERGENCY MEDICINE

## 2022-05-08 PROCEDURE — U0004 COV-19 TEST NON-CDC HGH THRU: HCPCS | Performed by: INTERNAL MEDICINE

## 2022-05-08 PROCEDURE — 99223 1ST HOSP IP/OBS HIGH 75: CPT | Performed by: GENERAL PRACTICE

## 2022-05-08 PROCEDURE — 85610 PROTHROMBIN TIME: CPT | Performed by: GENERAL PRACTICE

## 2022-05-08 PROCEDURE — 25010000002 VANCOMYCIN 5 G RECONSTITUTED SOLUTION: Performed by: GENERAL PRACTICE

## 2022-05-08 PROCEDURE — 87899 AGENT NOS ASSAY W/OPTIC: CPT | Performed by: GENERAL PRACTICE

## 2022-05-08 PROCEDURE — 80053 COMPREHEN METABOLIC PANEL: CPT | Performed by: EMERGENCY MEDICINE

## 2022-05-08 PROCEDURE — 83605 ASSAY OF LACTIC ACID: CPT | Performed by: EMERGENCY MEDICINE

## 2022-05-08 PROCEDURE — 85025 COMPLETE CBC W/AUTO DIFF WBC: CPT | Performed by: EMERGENCY MEDICINE

## 2022-05-08 PROCEDURE — 36415 COLL VENOUS BLD VENIPUNCTURE: CPT

## 2022-05-08 PROCEDURE — 71260 CT THORAX DX C+: CPT

## 2022-05-08 PROCEDURE — 25010000002 MEROPENEM PER 100 MG: Performed by: EMERGENCY MEDICINE

## 2022-05-08 PROCEDURE — 71045 X-RAY EXAM CHEST 1 VIEW: CPT

## 2022-05-08 PROCEDURE — 93010 ELECTROCARDIOGRAM REPORT: CPT | Performed by: INTERNAL MEDICINE

## 2022-05-08 PROCEDURE — 84100 ASSAY OF PHOSPHORUS: CPT | Performed by: GENERAL PRACTICE

## 2022-05-08 PROCEDURE — 99285 EMERGENCY DEPT VISIT HI MDM: CPT

## 2022-05-08 PROCEDURE — 94799 UNLISTED PULMONARY SVC/PX: CPT

## 2022-05-08 PROCEDURE — 25010000002 MEROPENEM PER 100 MG: Performed by: GENERAL PRACTICE

## 2022-05-08 PROCEDURE — 93005 ELECTROCARDIOGRAM TRACING: CPT | Performed by: EMERGENCY MEDICINE

## 2022-05-08 PROCEDURE — 25010000002 VANCOMYCIN 5 G RECONSTITUTED SOLUTION: Performed by: EMERGENCY MEDICINE

## 2022-05-08 PROCEDURE — 0 IOPAMIDOL PER 1 ML: Performed by: INTERNAL MEDICINE

## 2022-05-08 PROCEDURE — 83880 ASSAY OF NATRIURETIC PEPTIDE: CPT | Performed by: EMERGENCY MEDICINE

## 2022-05-08 PROCEDURE — 87040 BLOOD CULTURE FOR BACTERIA: CPT | Performed by: GENERAL PRACTICE

## 2022-05-08 PROCEDURE — 85025 COMPLETE CBC W/AUTO DIFF WBC: CPT | Performed by: GENERAL PRACTICE

## 2022-05-08 PROCEDURE — 83735 ASSAY OF MAGNESIUM: CPT | Performed by: GENERAL PRACTICE

## 2022-05-08 RX ORDER — ALBUTEROL SULFATE 2.5 MG/3ML
2.5 SOLUTION RESPIRATORY (INHALATION) EVERY 4 HOURS PRN
Status: DISCONTINUED | OUTPATIENT
Start: 2022-05-08 | End: 2022-05-10 | Stop reason: HOSPADM

## 2022-05-08 RX ORDER — LATANOPROST 50 UG/ML
1 SOLUTION/ DROPS OPHTHALMIC NIGHTLY
COMMUNITY

## 2022-05-08 RX ORDER — POLYETHYLENE GLYCOL 3350 17 G/17G
17 POWDER, FOR SOLUTION ORAL DAILY PRN
Status: DISCONTINUED | OUTPATIENT
Start: 2022-05-08 | End: 2022-05-10 | Stop reason: HOSPADM

## 2022-05-08 RX ORDER — DONEPEZIL HYDROCHLORIDE 10 MG/1
10 TABLET, FILM COATED ORAL NIGHTLY
Status: DISCONTINUED | OUTPATIENT
Start: 2022-05-08 | End: 2022-05-10 | Stop reason: HOSPADM

## 2022-05-08 RX ORDER — LEVOTHYROXINE SODIUM 0.15 MG/1
300 TABLET ORAL EVERY MORNING
Status: DISCONTINUED | OUTPATIENT
Start: 2022-05-08 | End: 2022-05-10 | Stop reason: HOSPADM

## 2022-05-08 RX ORDER — BISACODYL 5 MG/1
5 TABLET, DELAYED RELEASE ORAL DAILY PRN
Status: DISCONTINUED | OUTPATIENT
Start: 2022-05-08 | End: 2022-05-10 | Stop reason: HOSPADM

## 2022-05-08 RX ORDER — AMLODIPINE BESYLATE 2.5 MG/1
2.5 TABLET ORAL DAILY
Status: DISCONTINUED | OUTPATIENT
Start: 2022-05-08 | End: 2022-05-10 | Stop reason: HOSPADM

## 2022-05-08 RX ORDER — FUROSEMIDE 20 MG/1
20 TABLET ORAL DAILY
COMMUNITY

## 2022-05-08 RX ORDER — IPRATROPIUM BROMIDE AND ALBUTEROL SULFATE 2.5; .5 MG/3ML; MG/3ML
3 SOLUTION RESPIRATORY (INHALATION) ONCE
Status: COMPLETED | OUTPATIENT
Start: 2022-05-08 | End: 2022-05-08

## 2022-05-08 RX ORDER — ACETAMINOPHEN 500 MG
500 TABLET ORAL EVERY 6 HOURS PRN
Status: DISCONTINUED | OUTPATIENT
Start: 2022-05-08 | End: 2022-05-10 | Stop reason: HOSPADM

## 2022-05-08 RX ORDER — LEVETIRACETAM 5 MG/ML
500 INJECTION INTRAVASCULAR ONCE
Status: COMPLETED | OUTPATIENT
Start: 2022-05-08 | End: 2022-05-08

## 2022-05-08 RX ORDER — KETOCONAZOLE 20 MG/G
1 CREAM TOPICAL 2 TIMES DAILY
COMMUNITY
End: 2022-08-29

## 2022-05-08 RX ORDER — SODIUM CHLORIDE 0.9 % (FLUSH) 0.9 %
10 SYRINGE (ML) INJECTION EVERY 12 HOURS SCHEDULED
Status: DISCONTINUED | OUTPATIENT
Start: 2022-05-08 | End: 2022-05-10 | Stop reason: HOSPADM

## 2022-05-08 RX ORDER — MEMANTINE HYDROCHLORIDE 10 MG/1
10 TABLET ORAL 2 TIMES DAILY
Status: DISCONTINUED | OUTPATIENT
Start: 2022-05-08 | End: 2022-05-10 | Stop reason: HOSPADM

## 2022-05-08 RX ORDER — ONDANSETRON 4 MG/1
4 TABLET, FILM COATED ORAL EVERY 6 HOURS PRN
Status: DISCONTINUED | OUTPATIENT
Start: 2022-05-08 | End: 2022-05-10 | Stop reason: HOSPADM

## 2022-05-08 RX ORDER — AMOXICILLIN 250 MG
2 CAPSULE ORAL 2 TIMES DAILY
Status: DISCONTINUED | OUTPATIENT
Start: 2022-05-08 | End: 2022-05-10 | Stop reason: HOSPADM

## 2022-05-08 RX ORDER — BISACODYL 10 MG
10 SUPPOSITORY, RECTAL RECTAL DAILY PRN
Status: DISCONTINUED | OUTPATIENT
Start: 2022-05-08 | End: 2022-05-10 | Stop reason: HOSPADM

## 2022-05-08 RX ORDER — SODIUM CHLORIDE 0.9 % (FLUSH) 0.9 %
10 SYRINGE (ML) INJECTION AS NEEDED
Status: DISCONTINUED | OUTPATIENT
Start: 2022-05-08 | End: 2022-05-10 | Stop reason: HOSPADM

## 2022-05-08 RX ORDER — LEVETIRACETAM 500 MG/1
500 TABLET ORAL 2 TIMES DAILY
Status: DISCONTINUED | OUTPATIENT
Start: 2022-05-08 | End: 2022-05-10 | Stop reason: HOSPADM

## 2022-05-08 RX ORDER — LIDOCAINE HCL 4% 4 G/100G
1 CREAM TOPICAL 2 TIMES DAILY
COMMUNITY

## 2022-05-08 RX ADMIN — MEROPENEM 1 G: 1 INJECTION, POWDER, FOR SOLUTION INTRAVENOUS at 03:50

## 2022-05-08 RX ADMIN — LEVETIRACETAM 500 MG: 500 TABLET, FILM COATED ORAL at 08:51

## 2022-05-08 RX ADMIN — Medication 10 ML: at 10:47

## 2022-05-08 RX ADMIN — MEMANTINE 10 MG: 10 TABLET ORAL at 08:55

## 2022-05-08 RX ADMIN — Medication 10 ML: at 20:25

## 2022-05-08 RX ADMIN — MEROPENEM 1 G: 1 INJECTION, POWDER, FOR SOLUTION INTRAVENOUS at 13:00

## 2022-05-08 RX ADMIN — LEVETIRACETAM 500 MG: 5 INJECTION INTRAVASCULAR at 20:24

## 2022-05-08 RX ADMIN — AMLODIPINE BESYLATE 2.5 MG: 2.5 TABLET ORAL at 08:51

## 2022-05-08 RX ADMIN — LEVOTHYROXINE SODIUM 300 MCG: 0.15 TABLET ORAL at 08:51

## 2022-05-08 RX ADMIN — IPRATROPIUM BROMIDE AND ALBUTEROL SULFATE 3 ML: .5; 3 SOLUTION RESPIRATORY (INHALATION) at 03:33

## 2022-05-08 RX ADMIN — VANCOMYCIN HYDROCHLORIDE 1750 MG: 5 INJECTION, POWDER, LYOPHILIZED, FOR SOLUTION INTRAVENOUS at 03:56

## 2022-05-08 RX ADMIN — MEROPENEM 1 G: 1 INJECTION, POWDER, FOR SOLUTION INTRAVENOUS at 20:25

## 2022-05-08 RX ADMIN — IOPAMIDOL 60 ML: 755 INJECTION, SOLUTION INTRAVENOUS at 19:29

## 2022-05-08 RX ADMIN — VANCOMYCIN HYDROCHLORIDE 750 MG: 5 INJECTION, POWDER, LYOPHILIZED, FOR SOLUTION INTRAVENOUS at 16:37

## 2022-05-08 RX ADMIN — SODIUM CHLORIDE 1000 ML: 9 INJECTION, SOLUTION INTRAVENOUS at 03:50

## 2022-05-08 NOTE — NURSING NOTE
Spoke on phone with daughter Janessa, with her help able to complete admission navigator to the best of our abilities. No questions or concerns at this time.

## 2022-05-08 NOTE — ED NOTES
Only one set of blood cultures done due to patient being difficult stick, Dr. Peraza aware. Care not to be delayed in antibiotic treatment.

## 2022-05-08 NOTE — PAYOR COMM NOTE
"Kzeia Daily (81 y.o. Female)             Date of Birth   1940    Social Security Number       Address   [address] [place] [address]    Home Phone   [phone]    MRN   [id number]       Advent   Jainism    Marital Status                               Admission Date   5/8/22    Admission Type   Emergency    Admitting Provider   Magan Thompson DO    Attending Provider   Magan Thompson DO    Department, Room/Bed   [facility] 4TH FLOOR MEDICAL TELEMETRY UNIT, 403/1       Discharge Date       Discharge Disposition       Discharge Destination                               Attending Provider: Magan Thompson DO    Allergies: Erythromycin, Bacitracin, Cefazolin, Cephalosporins, Neomycin, Penicillins, Polymox [Amoxicillin], Prednisone, Quinolones, Statins, Sulfa Antibiotics, Trimethoprim    Isolation: Contact Air   Infection: MRSA (05/02/22), COVID Screen (preop/placement) (05/08/22)   Code Status: CPR   Advance Care Planning Activity    Ht: 167.6 cm (66\")   Wt: 82.3 kg (181 lb 7 oz)    Admission Cmt: None   Principal Problem: None                Active Insurance as of 5/8/2022     Primary Coverage     Payor Plan Insurance Group Employer/Plan Group    ANTHEM MEDICARE REPLACEMENT ANTHEM MEDICARE ADVANTAGE [id number]     Payor Plan Address Payor Plan Phone Number Payor Plan Fax Number Effective Dates    [address] [phone]  1/1/2019 - None Entered    [facility] [id number]       Subscriber Name Subscriber Birth Date Member ID       KEZIA DAILY 1940 [id number]                 Emergency Contacts      (Rel.) Home Phone Work Phone Mobile Phone    EMELYPRADIPYIN (Daughter) [phone] -- [phone]    TEJAL DAILY (Son) [phone] -- --          5/8 Auth/Cert Initiated via [facility]. Ref# [id number]. Clinicals faxed.    Marc: BRANDIE [id number] Tax ID [id number]  Problem List           Codes Noted - Resolved       Hospital    Pneumonia due to " infectious organism, unspecified laterality, unspecified part of lung ICD-10-CM: J18.9  ICD-9-CM: 486 2022 - Present       Non-Hospital    Seizure disorder (HCC) (Chronic) ICD-10-CM: G40.909  ICD-9-CM: 345.90 2022 - Present    COPD (chronic obstructive pulmonary disease) (HCC) ICD-10-CM: J44.9  ICD-9-CM: 496 Unknown - Present    Hypothyroidism ICD-10-CM: E03.9  ICD-9-CM: 244.9 Unknown - Present    Dementia (HCC) ICD-10-CM: F03.90  ICD-9-CM: 294.20 Unknown - Present    Difficulty with speech ICD-10-CM: R47.9  ICD-9-CM: 784.59 Unknown - Present    Hypertension ICD-10-CM: I10  ICD-9-CM: 401.9 Unknown - Present    History of CVA (cerebrovascular accident) ICD-10-CM: Z86.73  ICD-9-CM: V12.54 Unknown - Present    Hypophosphatemia ICD-10-CM: E83.39  ICD-9-CM: 275.3 Unknown - Present             History & Physical      Ashleigh Jarrell MD at 22 0628           [facility] HISTORY AND PHYSICAL  Date: 2022   Patient Name: Kezia Garcia  : 1940  MRN: [id number]  Primary Care Physician:  Laurent Blanco MD  Date of admission: 2022    Subjective   Subjective     Chief Complaint:     HPI: Kezia Garcia is a 81 y.o. female vomiting from nursing home.  Noticed patient was discharged from our hospital 4 days ago she comes back with increased shortness of breath and difficulty breathing.  Patient has a history of seizure disorder COPD hypothyroidism dementia.  She was found to be in moderate respiratory distress today.  Patient is unable to provide a history on arrival she was tachypneic and very congested with decreased breath sounds and crackles.  Patient had a recent hospitalization on  also for difficulty handling secretions she had the same presentation then.      Personal History   ROS     Constitutional: No fever, unintentional weight loss, malaise  HEENT: No vision changes, loss of vision, double vision, difficulty swallowing, painful swallowing, or  tinnitus  Respiratory: No cough, shortness of breath, sputum production, or hemoptysis  Cardiovascular: No chest pain, palpitations, orthopnea, or paroxysmal nocturnal dyspnea  Gastrointestinal: No nausea, vomiting, diarrhea, hematochezia, bright red blood per rectum, dark tarry stools, bowel incontinence or constipation  Genitourinary: No dysuria, hematuria, bladder incontinence, frequency, nocturia, or hesitancy  Musculoskeletal: No arthritis, joint swelling, deformities or joint pain  Endocrine: No fatigue, heat or cold intolerance  Hematologic: No excessive bruising or bleeding  Psychiatric: No Anxiety or depression  Neurologic: No Confusion, numbness, or weakness  Skin: No rash or open wounds         PMH  Past Medical History:   Diagnosis Date   • Anemia    • COPD (chronic obstructive pulmonary disease) ([facility])    • Coronary artery disease    • Dementia ([facility])    • Disease of thyroid gland    • Hyperkalemia    • Hyperlipidemia    • Hypertension    • Seizures ([facility])          PSH  History reviewed. No pertinent surgical history.    FH  History reviewed. No pertinent family history.    SOCIAL HISTORY   reports that she has never smoked. She does not have any smokeless tobacco history on file. She reports previous alcohol use. She reports that she does not use drugs.     ALLERGIES   Allergies   Allergen Reactions   • Erythromycin Anaphylaxis   • Bacitracin Unknown - High Severity   • Cefazolin Unknown - High Severity   • Cephalosporins Unknown - High Severity   • Neomycin Unknown - High Severity   • Penicillins Unknown - High Severity   • Polymox [Amoxicillin] Unknown - High Severity   • Prednisone Unknown - High Severity   • Quinolones Unknown - High Severity   • Statins Unknown - High Severity   • Sulfa Antibiotics Unknown - High Severity   • Trimethoprim Unknown - High Severity       HOME MEDICINES  Prior to Admission Medications   Prescriptions Last Dose Informant Patient Reported? Taking?   Flaxseed, Linseed,  (Flax Seed Oil) 1000 MG capsule 5/7/2022 at Unknown time  Yes Yes   Sig: Take 1 tablet by mouth Daily.   Lidocaine HCl (LMX) 4 % cream 5/7/2022 at Unknown time  Yes Yes   Sig: Apply 1 application topically to the appropriate area as directed 2 (Two) Times a Day. To lower back   acetaminophen (TYLENOL) 500 MG tablet 5/7/2022 at Unknown time  Yes Yes   Sig: Take 500 mg by mouth Every 6 (Six) Hours As Needed for Mild Pain  or Fever.   albuterol (PROVENTIL) (2.5 MG/3ML) 0.083% nebulizer solution 5/7/2022 at Unknown time  Yes Yes   Sig: Take 2.5 mg by nebulization Every 4 (Four) Hours As Needed for Wheezing or Shortness of Air.   amLODIPine (NORVASC) 5 MG tablet 5/7/2022 at Unknown time  No Yes   Sig: Take 0.5 tablets by mouth Daily.   donepezil (ARICEPT) 10 MG tablet 5/7/2022 at Unknown time  Yes Yes   Sig: Take 10 mg by mouth Every Night.   fenofibrate 160 MG tablet 5/7/2022 at Unknown time  Yes Yes   Sig: Take 160 mg by mouth Daily.   furosemide (Lasix) 20 MG tablet 5/7/2022 at Unknown time  No Yes   Sig: Take 1 tablet by mouth Daily for 5 days.   guaiFENesin (MUCINEX) 600 MG 12 hr tablet 5/7/2022 at Unknown time  No Yes   Sig: Take 2 tablets by mouth 2 (Two) Times a Day.   ketoconazole (NIZORAL) 2 % cream 5/7/2022 at Unknown time  Yes Yes   Sig: Apply 1 application topically to the appropriate area as directed 2 (Two) Times a Day. To back left shoulder   latanoprost (XALATAN) 0.005 % ophthalmic solution 5/7/2022 at Unknown time  Yes Yes   Sig: Administer 1 drop to both eyes Every Night.   levETIRAcetam (KEPPRA) 500 MG tablet 5/7/2022 at Unknown time  Yes Yes   Sig: Take 500 mg by mouth 2 (Two) Times a Day.   levothyroxine (SYNTHROID, LEVOTHROID) 300 MCG tablet 5/7/2022 at Unknown time  Yes Yes   Sig: Take 300 mcg by mouth Daily.   loperamide (IMODIUM) 2 MG capsule 5/7/2022 at Unknown time  Yes Yes   Sig: Take 2 mg by mouth 4 (Four) Times a Day As Needed for Diarrhea.   memantine (NAMENDA) 10 MG tablet 5/7/2022 at  Unknown time  Yes Yes   Sig: Take 10 mg by mouth 2 (Two) Times a Day.      Facility-Administered Medications: None                     Objective     Vitals:   Temp:  [96.9 °F (36.1 °C)] 96.9 °F (36.1 °C)  Heart Rate:  [60-86] 61  Resp:  [16-24] 17  BP: (123-138)/(50-90) 123/52  Flow (L/min):  [3-15] 3    Physical Exam  Vital signs were reviewed.  General appearance - Patient appears well-developed and well-nourished.    Head - Normocephalic, atraumatic.  Pupils - Equal, round, reactive to light.  Extraocular muscles are intact.  Conjunctiva is clear  Oral mucosa - Pink and moist without lesions or erythema.  Uvula is midline.  Neck - Supple.  Trachea was midline.  There is no palpable lymphadenopathy or thyromegaly.  There are no meningeal signs  Lungs Decreased breath sounds with crackles and congestion.   -Breath sounds equal bilateral.  No crackles no rales.  Heart -Regular rate and rhythm no murmurs no gallops.  Abdomen -     There is no rebound, guarding, or rigidity.  There are no palpable masses.  There are no pulsatile masses.  Back - Spine is straight and midline.  There is no CVA tenderness.  Extremities - Intact x4 with full range of motion.  There is no palpable edema.  Neurologic - Cranial nerves II through XII are grossly intact.    Integument - There are no rashes.  There are no petechia or purpura lesions noted.  There are no vesicular lesions noted.           Assessment / Plan     Assessment/Plan:   Acute respiratory failure  Hospital-acquired pneumonia  History of seizures  Acute COPD  Hypothyroidism  Hypertension  History of CVA    PLAN  Patient has had multiple hospitalizations for inability to handle secretions recurrent pneumonia  -- Patient had a palliative care consult on 5/1/2022 consult Per records it looks like conversation was initiated with her daughter with no return of phone call.    Pneumonia  -- I will start patient on antibiotics broad-spectrum antibiotic including meropenem  vancomycin  --obtain strep normal mycoplasma legionella and fungal cultures.  --keep patient on supplemental oxygen as needed  --Cultures were obtained on first dose of antibiotic was given within 30 minutes.  --Chest x-ray was obtained and reviewed.  --Had an evaluation by speech that recommended aspiration precaution but was negative for aspiration.     Seizures  --Resume seizure meds     Hypertension  -- Amlodipine    Hypothyroidism  -- Home Synthroid.    DVT prophylaxis:  Mechanical DVT prophylaxis orders are present.    CODE STATUS:       Result Review:      [x]  Laboratory  [x]  Microbiology  [x]  Radiology  [x]  EKG/Telemetry   []  Cardiology/Vascular   []  Pathology  []  Old records  []  Other:    Admission Status:  I believe this patient meets inpatient status.    Electronically signed by Ashleigh Jarrell MD, 05/08/22, 6:28 AM EDT.               Electronically signed by Ashleigh Jarrell MD at 05/08/22 0643          Emergency Department Notes      Severino Peraza MD at 05/08/22 0309          Time: 3:09 AM EDT    Chief Complaint: SOB    History of Present Illness:  Patient is a 81 y.o. year old female that presents to the emergency department with moderate SOB and low O2 sats today. Nursing home staff gave report that the pt's O2 sat was in the 80s today.     Pt has a hx of COPD and does not have home O2. Pt is A&0x1 at baseline.     History provided by: ED nurse.  History limited by:  Dementia  Recently seen: Pt recently admitted for seizures (has seizure hx).     Patient Care Team  Primary Care Provider: Laurent Blanco MD    Past Medical History:     Allergies   Allergen Reactions   • Erythromycin Anaphylaxis   • Bacitracin Unknown - High Severity   • Cefazolin Unknown - High Severity   • Cephalosporins Unknown - High Severity   • Neomycin Unknown - High Severity   • Penicillins Unknown - High Severity   • Polymox [Amoxicillin] Unknown - High Severity   • Prednisone Unknown - High Severity   • Quinolones  Unknown - High Severity   • Statins Unknown - High Severity   • Sulfa Antibiotics Unknown - High Severity   • Trimethoprim Unknown - High Severity     Past Medical History:   Diagnosis Date   • Anemia    • COPD (chronic obstructive pulmonary disease) ([facility])    • Coronary artery disease    • Dementia ([facility])    • Disease of thyroid gland    • Hyperkalemia    • Hyperlipidemia    • Hypertension    • Seizures ([facility])      History reviewed. No pertinent surgical history.  History reviewed. No pertinent family history.    Home Medications:  Prior to Admission medications    Medication Sig Start Date End Date Taking? Authorizing Provider   acetaminophen (TYLENOL) 500 MG tablet Take 500 mg by mouth Every 6 (Six) Hours As Needed for Mild Pain  or Fever.    Branden Marion MD   albuterol (PROVENTIL) (2.5 MG/3ML) 0.083% nebulizer solution Take 2.5 mg by nebulization Every 4 (Four) Hours As Needed for Wheezing or Shortness of Air.    Branden Marion MD   amLODIPine (NORVASC) 5 MG tablet Take 0.5 tablets by mouth Daily. 5/4/22   Tariq Hernandes DO   donepezil (ARICEPT) 10 MG tablet Take 10 mg by mouth Every Night.    Branden Marion MD   fenofibrate 160 MG tablet Take 160 mg by mouth Daily.    Branden Marion MD   Flaxseed, Linseed, (Flax Seed Oil) 1000 MG capsule Take 1 tablet by mouth Daily.    Branden Marion MD   furosemide (Lasix) 20 MG tablet Take 1 tablet by mouth Daily for 5 days. 5/5/22 5/10/22  Tariq Hernandes DO   guaiFENesin (MUCINEX) 600 MG 12 hr tablet Take 2 tablets by mouth 2 (Two) Times a Day. 5/4/22   Tariq Hernandes DO   ketoconazole (NIZORAL) 2 % cream Apply 1 application topically to the appropriate area as directed 2 (Two) Times a Day. To back left shoulder    Branden Marion MD   latanoprost (XALATAN) 0.005 % ophthalmic solution Administer 1 drop to both eyes Every Night.    Branden Marion MD   levETIRAcetam (KEPPRA) 500 MG tablet Take 500 mg by mouth 2 (Two) Times a Day.     "Branden Marion MD   levothyroxine (SYNTHROID, LEVOTHROID) 300 MCG tablet Take 300 mcg by mouth Daily.    Branden Marion MD   Lidocaine HCl (LMX) 4 % cream Apply 1 application topically to the appropriate area as directed 2 (Two) Times a Day. To lower back    Branden Marion MD   loperamide (IMODIUM) 2 MG capsule Take 2 mg by mouth 4 (Four) Times a Day As Needed for Diarrhea.    Branden Marion MD   memantine (NAMENDA) 10 MG tablet Take 10 mg by mouth 2 (Two) Times a Day.    Branden Marion MD        Social History:   Social History     Tobacco Use   • Smoking status: Never Smoker   Substance Use Topics   • Alcohol use: Not Currently   • Drug use: Never       Record Review:  I have reviewed the patient's records in [facility].     Review of Systems:  Review of Systems   Unable to perform ROS: Dementia        Physical Exam:  /64   Pulse 64   Temp 96.9 °F (36.1 °C) (Axillary)   Resp 17   Ht 167.6 cm (66\")   Wt 82.3 kg (181 lb 7 oz)   SpO2 98%   BMI 29.28 kg/m²     Physical Exam  Vitals and nursing note reviewed.   Constitutional:       General: She is not in acute distress.     Appearance: Normal appearance.   HENT:      Head: Normocephalic and atraumatic.      Nose: Nose normal.   Eyes:      General: No scleral icterus.  Cardiovascular:      Rate and Rhythm: Normal rate and regular rhythm.      Heart sounds: Normal heart sounds.   Pulmonary:      Effort: Pulmonary effort is normal. No respiratory distress.      Breath sounds: Rales (bilaterally) present.      Comments: Wet sounding cough.   Abdominal:      Palpations: Abdomen is soft.      Tenderness: There is no abdominal tenderness.   Musculoskeletal:         General: Normal range of motion.      Cervical back: Neck supple.      Right lower leg: No edema.      Left lower leg: No edema.   Skin:     General: Skin is warm and dry.   Neurological:      General: No focal deficit present.      Mental Status: She is alert.      " Comments: oriented x1                 Medications in the Emergency Department:  Medications   sodium chloride 0.9 % flush 10 mL (has no administration in time range)   sodium chloride 0.9 % flush 10 mL (has no administration in time range)   sodium chloride 0.9 % flush 10 mL (has no administration in time range)   sennosides-docusate (PERICOLACE) 8.6-50 MG per tablet 2 tablet (has no administration in time range)     And   polyethylene glycol (MIRALAX) packet 17 g (has no administration in time range)     And   bisacodyl (DULCOLAX) EC tablet 5 mg (has no administration in time range)     And   bisacodyl (DULCOLAX) suppository 10 mg (has no administration in time range)   ondansetron (ZOFRAN) tablet 4 mg (has no administration in time range)   meropenem (MERREM) 1 g/100 mL 0.9% NS (mbp) (has no administration in time range)   Pharmacy to dose vancomycin (has no administration in time range)   vancomycin 750 mg/250 mL 0.9% NS IVPB ([facility]) (has no administration in time range)   acetaminophen (TYLENOL) tablet 500 mg (has no administration in time range)   albuterol (PROVENTIL) nebulizer solution 0.083% 2.5 mg/3mL (has no administration in time range)   amLODIPine (NORVASC) tablet 2.5 mg (has no administration in time range)   donepezil (ARICEPT) tablet 10 mg (has no administration in time range)   levETIRAcetam (KEPPRA) tablet 500 mg (has no administration in time range)   levothyroxine (SYNTHROID, LEVOTHROID) tablet 300 mcg (has no administration in time range)   memantine (NAMENDA) tablet 10 mg (has no administration in time range)   sodium chloride 0.9 % bolus 1,000 mL (0 mL Intravenous Stopped 5/8/22 0658)   meropenem (MERREM) 1 g/100 mL 0.9% NS (mbp) (0 g Intravenous Stopped 5/8/22 0436)   vancomycin 1750 mg/500 mL 0.9% NS IVPB ([facility]) (0 mg/kg × 82.3 kg Intravenous Stopped 5/8/22 0653)   ipratropium-albuterol (DUO-NEB) nebulizer solution 3 mL (3 mL Nebulization Given 5/8/22 8753)        Labs  Lab Results (last 24  hours)     Procedure Component Value Units Date/Time    CBC & Differential [[id number]]  (Abnormal) Collected: 05/08/22 0319    Specimen: Blood from Arm, Left Updated: 05/08/22 0326    Narrative:      The following orders were created for panel order CBC & Differential.  Procedure                               Abnormality         Status                     ---------                               -----------         ------                     CBC Auto Differential[[id number]]        Abnormal            Final result                 Please view results for these tests on the individual orders.    Comprehensive Metabolic Panel [[id number]]  (Abnormal) Collected: 05/08/22 0319    Specimen: Blood from Arm, Left Updated: 05/08/22 0346     Glucose 130 mg/dL      BUN 19 mg/dL      Creatinine 0.91 mg/dL      Sodium 140 mmol/L      Potassium 3.4 mmol/L      Chloride 106 mmol/L      CO2 21.5 mmol/L      Calcium 9.1 mg/dL      Total Protein 7.2 g/dL      Albumin 3.50 g/dL      ALT (SGPT) 13 U/L      AST (SGOT) 15 U/L      Alkaline Phosphatase 45 U/L      Total Bilirubin 0.3 mg/dL      Globulin 3.7 gm/dL      A/G Ratio 0.9 g/dL      BUN/Creatinine Ratio 20.9     Anion Gap 12.5 mmol/L      eGFR 64.3 mL/min/1.73      Comment: National Kidney Foundation and American Society of Nephrology (ASN) Task Force recommended calculation based on the Chronic Kidney Disease Epidemiology Collaboration (CKD-EPI) equation refit without adjustment for race.       Narrative:      GFR Normal >60  Chronic Kidney Disease <60  Kidney Failure <15      BNP [[id number]]  (Normal) Collected: 05/08/22 0319    Specimen: Blood from Arm, Left Updated: 05/08/22 0344     proBNP 816.1 pg/mL     Narrative:      Among patients with dyspnea, NT-proBNP is highly sensitive for the detection of acute congestive heart failure. In addition NT-proBNP of <300 pg/ml effectively rules out acute congestive heart failure with 99% negative predictive value.    Results may be  falsely decreased if patient taking Biotin.      Troponin [[id number]]  (Normal) Collected: 05/08/22 0319    Specimen: Blood from Arm, Left Updated: 05/08/22 0346     Troponin T <0.010 ng/mL     Narrative:      Troponin T Reference Range:  <= 0.03 ng/mL-   Negative for AMI  >0.03 ng/mL-     Abnormal for myocardial necrosis.  Clinicians would have to utilize clinical acumen, EKG, Troponin and serial changes to determine if it is an Acute Myocardial Infarction or myocardial injury due to an underlying chronic condition.       Results may be falsely decreased if patient taking Biotin.      CBC Auto Differential [[id number]]  (Abnormal) Collected: 05/08/22 0319    Specimen: Blood from Arm, Left Updated: 05/08/22 0326     WBC 9.60 10*3/mm3      RBC 3.30 10*6/mm3      Hemoglobin 10.6 g/dL      Hematocrit 32.3 %      MCV 97.9 fL      MCH 32.1 pg      MCHC 32.8 g/dL      RDW 14.2 %      RDW-SD 50.7 fl      MPV 10.5 fL      Platelets 307 10*3/mm3      Neutrophil % 67.3 %      Lymphocyte % 23.6 %      Monocyte % 5.8 %      Eosinophil % 1.8 %      Basophil % 0.4 %      Immature Grans % 1.1 %      Neutrophils, Absolute 6.45 10*3/mm3      Lymphocytes, Absolute 2.27 10*3/mm3      Monocytes, Absolute 0.56 10*3/mm3      Eosinophils, Absolute 0.17 10*3/mm3      Basophils, Absolute 0.04 10*3/mm3      Immature Grans, Absolute 0.11 10*3/mm3      nRBC 0.0 /100 WBC     Blood Culture - Blood, Arm, Left [[id number]] Collected: 05/08/22 0320    Specimen: Blood from Arm, Left Updated: 05/08/22 0323    Lactic Acid, Plasma [[id number]]  (Normal) Collected: 05/08/22 0320    Specimen: Blood from Arm, Left Updated: 05/08/22 0343     Lactate 2.0 mmol/L            Imaging:  XR Chest 1 View    Result Date: 5/8/2022  PROCEDURE: XR CHEST 1 VW  COMPARISON: [facility], CR, XR CHEST 1 VW, 4/30/2022, 14:45.  INDICATIONS: SHORTNESS OF BREATH.  FINDINGS: A single AP upright portable chest radiograph was performed.  There are low lung volumes.   There may be mild subsegmental atelectasis in the lung bases.  Borderline cardiac enlargement is possible.  There is a partially visualized distal limb of a ventriculoperitoneal () shunt projected over the right neck, right chest, and upper right abdomen.  The thoracic aorta is atherosclerotic.  No pneumothorax is seen.  The previously seen implantable loop recorder (ILR), projected over the left lateral costophrenic sulcus, is not clearly identified on the current study.       Probably no acute infiltrate is identified with pulmonary hypoinflation.      COMMENT:  Part of this note is an electronic transcription of spoken language to printed text. The electronic translation/transcription may permit erroneous, or at times, nonsensical (or even sensical) words or phrases to be inadvertently transcribed or omitted; this  has reviewed the note for such errors (as well as additional errors); however, some may still exist.  CATHY THAPA JR, MD       Electronically Signed and Approved By: CATHY THAPA JR, MD on 5/08/2022 at 2:57                Procedures:  Procedures    Progress                            Medical Decision Making:  MDM  Number of Diagnoses or Management Options  Diagnosis management comments: Sepsis criteria was met in the emergency department and the Sepsis protocol (including antibiotic administration) was initiated.      SIRS criteria considered:   1.  Temperature > 100.4 or <98.6    2.  Heart Rate > 90    3.  Respiratory Rate > 22    4.  WBC > 12K or <4K.             Severe Sepsis:     Respiratory: Mechanical Ventilation or Bipap  Hypotension: SBP > 90 or MAP < 65  Renal: Creatinine > 2  Metabolic: Lactic Acid > 2  Hematologic: Platelets < 100K or INR > 1.5  Hepatic: BILI  >  2  CNS: Sudden AMS     Septic Shock:     Severe Sepsis + Persistent hypotension or Lactic Acid > 4     Normal saline bolus, Antibiotics, and final disposition was based on these definitions.        Sepsis was  recognized at 03:21 EDT     Antibiotics were ordered.     30 cc/kg bolus was not indicated.       Patient did not receive the recommended 30ml/kg fluid bolus for sepsis because it would be harmful or detrimental to the patient.    The patient has concern for fluid overload.   The patient was ordered 1 L of fluids.    Total Critical Care time of 35 minutes. Total critical care time documented does not include time spent on separately billed procedures for services of nurses or physician assistants. I personally saw and examined the patient. I have reviewed all diagnostic interpretations and treatment plans as written. I was present for the key portions of any procedures performed and the inclusive time noted in any critical care statement. Critical care time includes patient management by me, time spent at the patients bedside,  time to review lab and imaging results, discussing patient care, documentation in the medical record, and time spent with family or caregiver.        Final diagnoses:   Pneumonia due to infectious organism, unspecified laterality, unspecified part of lung   Sepsis, due to unspecified organism, unspecified whether acute organ dysfunction present (HCC)   Acute respiratory failure with hypoxia (HCC)        Disposition:  ED Disposition     ED Disposition   Decision to Admit    Condition   --    Comment   Level of Care: Telemetry [5]   Diagnosis: Pneumonia due to infectious organism, unspecified laterality, unspecified part of lung [[id number]]   Admitting Physician: WATSON VALERO [[id number]]   Attending Physician: WATSON VALERO [[id number]]   Isolate for COVID?: Yes [1]   Certification: I Certify That Inpatient Hospital Services Are Medically Necessary For Greater Than 2 Midnights               Dictated Utilizing Dragon Dictation    Documentation assistance provided by Rosemarie Rodriguez acting as scribe for Severino Peraza MD. Information recorded by the scribe was done at my direction and has been verified and  validated by me.      Rosemarie Rodriguez  05/08/22 0318       Rosemarie Rodriguez  05/08/22 0321       Severino Peraza MD  05/08/22 0710      Electronically signed by Severino Peraza MD at 05/08/22 0710     Kylee Kaba, RN at 05/08/22 0342        Only one set of blood cultures done due to patient being difficult stick, Dr. Peraza aware. Care not to be delayed in antibiotic treatment.     Electronically signed by Kylee Kaba, RN at 05/08/22 0344     Kylee Kaba, RN at 05/08/22 0436        This rn changed pt's brief after pt had bowel movement. Pt linens changed and pt placed in position of comfort. Stool was soft and light brown in color    Electronically signed by Kylee Kaba, CESAR at 05/08/22 0437     Viridiana Ge at 05/08/22 0450        STAFF PROVIDED ORAL CARE AND FLOATED PATIENT HEELS.    Electronically signed by Viridiana Ge at 05/08/22 0451       Vital Signs (last day)     Date/Time Temp Temp src Pulse Resp BP Patient Position SpO2    05/08/22 10:46:51 98.8 (37.1) Oral 70 17 137/62 Sitting 96    05/08/22 09:16:09 98 (36.7) Oral 67 15 135/50 Lying 97    05/08/22 0851 -- -- 67 -- 131/57 -- --    05/08/22 08:01:22 -- -- 61 19 119/58 Lying 99    05/08/22 0647 -- -- 64 -- 136/64 -- 98    05/08/22 0646 -- -- 63 -- 136/64 -- 97    05/08/22 0644 -- -- 80 -- -- -- 97    05/08/22 0643 -- -- 65 -- -- -- 95    05/08/22 0642 -- -- 69 -- -- -- 97    05/08/22 0551 -- -- 61 17 123/52 Lying 99    05/08/22 0550 -- -- 64 -- -- -- 98    05/08/22 0549 -- -- 60 -- -- -- 100    05/08/22 0548 -- -- 67 -- -- -- --    05/08/22 0547 -- -- 63 -- -- -- 99    05/08/22 0546 -- -- 65 -- -- -- 100    05/08/22 0545 -- -- 65 -- 123/52 -- 100    05/08/22 0450 -- -- 72 17 125/50 -- 100    05/08/22 0445 -- -- 73 -- 125/50 -- 97    05/08/22 0444 -- -- 77 -- -- -- --    05/08/22 0433 -- -- 86 -- -- -- --    05/08/22 0343 -- -- 71 16 126/90 Sitting 92    05/08/22 0333 -- -- 83 22 -- -- 93    05/08/22 0238 96.9 (36.1) Axillary -- -- -- -- --     05/08/22 0233 -- -- 76 24 138/56 -- 100          Oxygen Therapy (last day)     Date/Time SpO2 Device (Oxygen Therapy) Flow (L/min) Oxygen Concentration (%) ETCO2 (mmHg)    05/08/22 10:46:51 96 room air -- -- --    05/08/22 09:16:09 97 nasal cannula 1 -- --    05/08/22 08:01:22 99 nasal cannula 1 -- --    05/08/22 0647 98 -- -- -- --    05/08/22 0646 97 nasal cannula 3 -- --    05/08/22 0644 97 -- -- -- --    05/08/22 0643 95 -- -- -- --    05/08/22 0642 97 -- -- -- --    05/08/22 0551 99 -- 3 -- --    05/08/22 0550 98 -- -- -- --    05/08/22 0549 100 -- -- -- --    05/08/22 0547 99 -- -- -- --    05/08/22 0546 100 -- -- -- --    05/08/22 0545 100 -- -- -- --    05/08/22 0450 100 nasal cannula -- 3 --    05/08/22 0445 97 nasal cannula 3 -- --    05/08/22 0343 92 nasal cannula -- 3 --    05/08/22 0333 93 nasal cannula 3 -- --    05/08/22 0233 100 nonrebreather mask 15 -- --            Facility-Administered Medications as of 5/8/2022   Medication Dose Route Frequency Provider Last Rate Last Admin   • acetaminophen (TYLENOL) tablet 500 mg  500 mg Oral Q6H PRN Ashleigh Jarrell MD       • albuterol (PROVENTIL) nebulizer solution 0.083% 2.5 mg/3mL  2.5 mg Nebulization Q4H PRN Ashleigh Jarrell MD       • amLODIPine (NORVASC) tablet 2.5 mg  2.5 mg Oral Daily Ashleigh Jarrell MD   2.5 mg at 05/08/22 0851   • sennosides-docusate (PERICOLACE) 8.6-50 MG per tablet 2 tablet  2 tablet Oral BID Ashleigh Jarrell MD        And   • polyethylene glycol (MIRALAX) packet 17 g  17 g Oral Daily PRN Ashleigh Jarrell MD        And   • bisacodyl (DULCOLAX) EC tablet 5 mg  5 mg Oral Daily PRN Ashleigh Jarrell MD        And   • bisacodyl (DULCOLAX) suppository 10 mg  10 mg Rectal Daily PRN Ashleigh Jarrell MD       • donepezil (ARICEPT) tablet 10 mg  10 mg Oral Nightly Ashleigh Jarrell MD       • [COMPLETED] ipratropium-albuterol (DUO-NEB) nebulizer solution 3 mL  3 mL Nebulization Once Severino Peraza MD   3 mL at 05/08/22 0333   • levETIRAcetam (KEPPRA)  tablet 500 mg  500 mg Oral BID Ashleigh Jarrell MD   500 mg at 05/08/22 0851   • levothyroxine (SYNTHROID, LEVOTHROID) tablet 300 mcg  300 mcg Oral QAM Ashleigh Jarrell MD   300 mcg at 05/08/22 0851   • memantine (NAMENDA) tablet 10 mg  10 mg Oral BID Ashleigh Jarrell MD   10 mg at 05/08/22 0855   • [COMPLETED] meropenem (MERREM) 1 g/100 mL 0.9% NS (mbp)  1 g Intravenous Once Severino Peraza MD   Stopped at 05/08/22 0436   • meropenem (MERREM) 1 g/100 mL 0.9% NS (mbp)  1 g Intravenous Q8H Ashleigh Jarrell MD       • ondansetron (ZOFRAN) tablet 4 mg  4 mg Oral Q6H PRN Ashleigh Jarrell MD       • Pharmacy to dose vancomycin   Does not apply Continuous PRN Ashleigh Jarrell MD       • [COMPLETED] sodium chloride 0.9 % bolus 1,000 mL  1,000 mL Intravenous Once Severino Peraza MD   Stopped at 05/08/22 0658   • sodium chloride 0.9 % flush 10 mL  10 mL Intravenous PRN Severino Peraza MD       • sodium chloride 0.9 % flush 10 mL  10 mL Intravenous Q12H Ashleigh Jarrell MD   10 mL at 05/08/22 1047   • sodium chloride 0.9 % flush 10 mL  10 mL Intravenous PRN Ashleigh Jarrell MD       • [COMPLETED] vancomycin 1750 mg/500 mL 0.9% NS IVPB ([facility])  20 mg/kg Intravenous Once Severino Peraza MD   Stopped at 05/08/22 0653   • vancomycin 750 mg/250 mL 0.9% NS IVPB ([facility])  750 mg Intravenous Q12H Ashleigh Jarrell MD         Orders (last 24 hrs)      Start     Ordered    05/09/22 1200  Vancomycin, Random  Timed         05/08/22 0639    05/09/22 0600  CBC (No Diff)  Morning Draw         05/08/22 0627 05/09/22 0600  Comprehensive Metabolic Panel  Morning Draw         05/08/22 0627    05/09/22 0600  Hemoglobin A1c  Morning Draw         05/08/22 0627    05/09/22 0600  Lipid Panel  Morning Draw         05/08/22 0627 05/09/22 0600  Magnesium  Morning Draw         05/08/22 0627    05/08/22 2100  donepezil (ARICEPT) tablet 10 mg  Nightly         05/08/22 0643    05/08/22 1600  vancomycin 750 mg/250 mL 0.9% NS IVPB ([facility])  Every 12 Hours         05/08/22 0639     "05/08/22 1200  meropenem (MERREM) 1 g/100 mL 0.9% NS (mbp)  Every 8 Hours         05/08/22 0628    05/08/22 1057  Procalcitonin  STAT         05/08/22 1056    05/08/22 1057  D-dimer, Quantitative  STAT         05/08/22 1056    05/08/22 0900  sodium chloride 0.9 % flush 10 mL  Every 12 Hours Scheduled         05/08/22 0627    05/08/22 0900  sennosides-docusate (PERICOLACE) 8.6-50 MG per tablet 2 tablet  2 Times Daily        \"And\" Linked Group Details    05/08/22 0627    05/08/22 0900  amLODIPine (NORVASC) tablet 2.5 mg  Daily         05/08/22 0643    05/08/22 0900  levETIRAcetam (KEPPRA) tablet 500 mg  2 Times Daily         05/08/22 0643    05/08/22 0900  memantine (NAMENDA) tablet 10 mg  2 Times Daily         05/08/22 0643    05/08/22 0800  Vital Signs  Every 4 Hours       05/08/22 0627    05/08/22 0745  levothyroxine (SYNTHROID, LEVOTHROID) tablet 300 mcg  Every Morning         05/08/22 0643    05/08/22 0644  Code Status and Medical Interventions:  Continuous         05/08/22 0643    05/08/22 0643  COVID PRE-OP / PRE-PROCEDURE SCREENING ORDER (NO ISOLATION) - Swab, Nasopharynx  Once,   Status:  Canceled         05/08/22 0643    05/08/22 0642  albuterol (PROVENTIL) nebulizer solution 0.083% 2.5 mg/3mL  Every 4 Hours PRN         05/08/22 0643    05/08/22 0642  acetaminophen (TYLENOL) tablet 500 mg  Every 6 Hours PRN         05/08/22 0643    05/08/22 0630  meropenem (MERREM) 1 g/100 mL 0.9% NS (mbp)  Once,   Status:  Discontinued         05/08/22 0628    05/08/22 0629  Blood Culture - Blood, Arm, Left  Once        \"And\" Linked Group Details    05/08/22 0628    05/08/22 0629  Blood Culture - Blood, Blood, Central Line  Once        \"And\" Linked Group Details    05/08/22 0628    05/08/22 0628  Pharmacy to dose vancomycin  Continuous PRN         05/08/22 0628    05/08/22 0628  Basic Metabolic Panel  Daily       05/08/22 0627    05/08/22 0628  CBC & Differential  Daily       05/08/22 0627 05/08/22 0628  Magnesium  Daily "       05/08/22 0627 05/08/22 0628  Phosphorus  Daily       05/08/22 0627 05/08/22 0628  Protime-INR  Daily       05/08/22 0627 05/08/22 0628  CBC Auto Differential  PROCEDURE ONCE         05/08/22 0627 05/08/22 0627  Mycoplasma Pneumoniae Antibody, IgM - Blood, Arm, Left  Once         05/08/22 0627 05/08/22 0627  S. Pneumo Ag Urine or CSF - Urine, Urine, Clean Catch  Once         05/08/22 0627 05/08/22 0627  COVID PRE-OP / PRE-PROCEDURE SCREENING ORDER (NO ISOLATION) - Swab, Nasopharynx  Once         05/08/22 0627 05/08/22 0627  Legionella Pneumophila 1 - 6,IgM  Once         05/08/22 0627 05/08/22 0627  Inpatient Admission  Once         05/08/22 0627 05/08/22 0627  COVID-19, [facility]BBOTT IN-HOUSE,NASAL Swab (NO TRANSPORT MEDIA) 2 HR TAT - Swab, Nasopharynx  PROCEDURE ONCE         05/08/22 0627 05/08/22 0601  Telemetry - Maintain IV Access  Continuous,   Status:  Canceled         05/08/22 0627 05/08/22 0601  Continuous Cardiac Monitoring  Continuous         05/08/22 0627 05/08/22 0601  ACLS Protocol For Life Threatening Dysrhythmias (Unless Code Status Indicates Otherwise)  Continuous         05/08/22 0627 05/08/22 0601  Notify Provider if ACLS Protocol Activated  Until Discontinued         05/08/22 0627 05/08/22 0601  Notify Provider (With Default Parameters)  Until Discontinued         05/08/22 0627 05/08/22 0601  Diet Regular; Cardiac, Consistent Carbohydrate, Renal  Diet Effective Now         05/08/22 0627 05/08/22 0601  Intake & Output  Every Shift       05/08/22 0627 05/08/22 0601  Weigh Patient  Once         05/08/22 0627 05/08/22 0601  Oxygen Therapy- Nasal Cannula; Titrate for SPO2: 90% - 95%  Continuous         05/08/22 0627    05/08/22 0601  Insert Peripheral IV  Once         05/08/22 0627    05/08/22 0601  Saline Lock & Maintain IV Access  Continuous         05/08/22 0627    05/08/22 0601  Place Sequential Compression Device  Once         05/08/22 0627     "05/08/22 0601  Maintain Sequential Compression Device  Continuous         05/08/22 0627    05/08/22 0601  Place Venous Foot Pump  Once         05/08/22 0627 05/08/22 0601  Maintain Venous Foot Pump  Once         05/08/22 0627 05/08/22 0600  sodium chloride 0.9 % flush 10 mL  As Needed         05/08/22 0627 05/08/22 0600  polyethylene glycol (MIRALAX) packet 17 g  Daily PRN        \"And\" Linked Group Details    05/08/22 0627    05/08/22 0600  bisacodyl (DULCOLAX) EC tablet 5 mg  Daily PRN        \"And\" Linked Group Details    05/08/22 0627    05/08/22 0600  bisacodyl (DULCOLAX) suppository 10 mg  Daily PRN        \"And\" Linked Group Details    05/08/22 0627    05/08/22 0600  ondansetron (ZOFRAN) tablet 4 mg  Every 6 Hours PRN         05/08/22 0627 05/08/22 0600  Oxygen Therapy- Nasal Cannula; Titrate for SPO2: 90% - 95%  Continuous PRN,   Status:  Canceled      Comments: If Patient Develops Unresponsiveness, Acute Dyspnea, Cyanosis or Suspected Hypoxemia Start Continuous Pulse Ox Monitoring, Apply Oxygen & Notify Provider    05/08/22 0627 05/08/22 0349  Hospitalist (on-call MD unless specified)  Once        Specialty:  Hospitalist  Provider:  Ashleigh Jarrell MD    05/08/22 0348    05/08/22 0330  sodium chloride 0.9 % bolus 1,000 mL  Once         05/08/22 0321    05/08/22 0330  meropenem (MERREM) 1 g/100 mL 0.9% NS (mbp)  Once         05/08/22 0321    05/08/22 0330  vancomycin 1750 mg/500 mL 0.9% NS IVPB ([facility])  Once         05/08/22 0321    05/08/22 0330  ipratropium-albuterol (DUO-NEB) nebulizer solution 3 mL  Once         05/08/22 0322    05/08/22 0321  Blood Culture - Blood, Arm, Left  Once         05/08/22 0320    05/08/22 0321  Lactic Acid, Plasma  STAT         05/08/22 0320    05/08/22 0229  NPO Diet NPO Type: Strict NPO  Diet Effective Now,   Status:  Canceled         05/08/22 0228 05/08/22 0229  Undress and Gown  Once         05/08/22 0228 05/08/22 0229  Cardiac Monitoring  Continuous,   " Status:  Canceled         05/08/22 0228 05/08/22 0229  Continuous Pulse Oximetry  Continuous         05/08/22 0228 05/08/22 0229  Vital Signs  Per Hospital Policy         05/08/22 0228 05/08/22 0229  ECG 12 Lead  Once         05/08/22 0228    05/08/22 0229  XR Chest 1 View  1 Time Imaging         05/08/22 0228    05/08/22 0229  Insert Peripheral IV  Once         05/08/22 0228    05/08/22 0229  [place] Draw  Once         05/08/22 0228    05/08/22 0229  CBC & Differential  Once         05/08/22 0228    05/08/22 0229  Comprehensive Metabolic Panel  Once         05/08/22 0228    05/08/22 0229  BNP  Once         05/08/22 0228 05/08/22 0229  Troponin  Once         05/08/22 0228 05/08/22 0229  Green Top (Gel)  PROCEDURE ONCE         05/08/22 0228    05/08/22 0229  Lavender Top  PROCEDURE ONCE         05/08/22 0228 05/08/22 0229  Gold Top - SST  PROCEDURE ONCE         05/08/22 0228 05/08/22 0229  Light Blue Top  PROCEDURE ONCE         05/08/22 0228 05/08/22 0229  CBC Auto Differential  PROCEDURE ONCE         05/08/22 0228 05/08/22 0228  Oxygen Therapy- Nasal Cannula; 2 LPM; Titrate for SPO2: equal to or greater than, 92%  Continuous PRN,   Status:  Canceled       05/08/22 0228 05/08/22 0228  sodium chloride 0.9 % flush 10 mL  As Needed         05/08/22 0228    Unscheduled  Telemetry - Pulse Oximetry  Continuous PRN      Comments: If Patient Develops Unresponsiveness, Acute Dyspnea, Cyanosis or Suspected Hypoxemia Start Continuous Pulse Ox Monitoring, Apply Oxygen & Notify Provider    05/08/22 0627    Unscheduled  ECG 12 Lead  As Needed      Comments: Nurse to Release if Patient Expericences Acute Chest Pain or Dysrhythmias    05/08/22 0627    Unscheduled  Up in Chair  As Needed       05/08/22 0627    --  Lidocaine HCl (LMX) 4 % cream  2 Times Daily         05/08/22 0714    --  furosemide (LASIX) 20 MG tablet  Daily         05/08/22 0718    --  latanoprost (XALATAN) 0.005 % ophthalmic solution   Nightly         05/08/22 0724    --  ketoconazole (NIZORAL) 2 % cream  2 Times Daily         05/08/22 0724                Respiratory Therapy (last 24 hours)     Respiratory Therapy Flowsheet     Row Name 05/08/22 10:46:51 05/08/22 1003 05/08/22 09:16:09 05/08/22 0851 05/08/22 08:01:22       Oxygen Therapy    SpO2 96 % -- 97 % -- 99 %    Pulse Oximetry Type Continuous -- Continuous -- Continuous    Device (Oxygen Therapy) room air -- nasal cannula -- nasal cannula    Flow (L/min) -- -- 1 -- 1    SF Ratio -- 458 -- -- --       Vital Signs    Temp 98.8 °F (37.1 °C) -- 98 °F (36.7 °C) -- --    Temp src Oral -- Oral -- --    Pulse 70 -- 67 67 61    Heart Rate Source Monitor -- Monitor -- Monitor    Resp 17 -- 15 -- 19    Resp Rate Source Monitor -- Monitor -- Monitor    /62 -- 135/50 131/57 119/58    BP Location Left arm -- Right arm -- Left arm    BP Method Automatic -- Automatic -- Automatic    Patient Position Sitting -- Lying -- Lying    Row Name 05/08/22 0647 05/08/22 0646 05/08/22 0644 05/08/22 0643 05/08/22 0642       Oxygen Therapy    SpO2 98 % 97 % 97 % 95 % 97 %    Pulse Oximetry Type -- Continuous -- -- --    Device (Oxygen Therapy) -- nasal cannula -- -- --    Flow (L/min) -- 3 -- -- --       Vital Signs    Pulse 64 63 80 65 69    Heart Rate Source -- Monitor -- -- --    Resp Rate Source -- Monitor -- -- --    /64 136/64 -- -- --    Noninvasive MAP (mmHg) 82 -- -- -- --    BP Method -- Automatic -- -- --    Row Name 05/08/22 0551 05/08/22 0550 05/08/22 0549 05/08/22 0548 05/08/22 0547       Oxygen Therapy    SpO2 99 % 98 % 100 % -- 99 %    Pulse Oximetry Type Continuous -- -- -- --    Flow (L/min) 3 -- -- -- --       Vital Signs    Pulse 61 64 60 67 63    Heart Rate Source Monitor -- -- -- --    Resp 17 -- -- -- --    Resp Rate Source Monitor -- -- -- --    /52 -- -- -- --    BP Method Automatic -- -- -- --    Patient Position Lying -- -- -- --    Row Name 05/08/22 0546 05/08/22 0545  05/08/22 0450 05/08/22 0445 05/08/22 0444       Oxygen Therapy    SpO2 100 % 100 % 100 % 97 % --    Pulse Oximetry Type -- -- -- Continuous --    Device (Oxygen Therapy) -- -- nasal cannula nasal cannula --    Flow (L/min) -- -- -- 3 --    Oxygen Concentration (%) -- -- 3 -- --       Vital Signs    Pulse 65 65 72 73 77    Heart Rate Source -- -- Monitor Monitor --    Resp -- -- 17 -- --    Resp Rate Source -- -- Monitor -- --    BP -- 123/52 125/50 125/50 --    Noninvasive MAP (mmHg) -- 70 -- 71 --    BP Method -- -- Automatic Automatic --    Row Name 05/08/22 0433 05/08/22 04:17:40 05/08/22 0343 05/08/22 0333 05/08/22 0238       $ Oximetry Charges    $ O2 Pt/System Assessment -- -- -- yes --       Oxygen Therapy    SpO2 -- -- 92 % 93 % --    Pulse Oximetry Type -- -- -- Continuous --    Device (Oxygen Therapy) -- -- nasal cannula nasal cannula --    Flow (L/min) -- -- -- 3 --    Oxygen Concentration (%) -- -- 3 -- --       Vital Signs    Temp -- -- -- -- 96.9 °F (36.1 °C)    Temp src -- -- -- -- Axillary    Pulse 86 -- 71 83 --    Heart Rate Source -- -- -- Monitor --    Resp -- -- 16 22 --    Resp Rate Source -- -- Monitor Visual --    BP -- -- 126/90 -- --    BP Method -- -- Automatic -- --    Patient Position -- -- Sitting -- --       Assessment    Respiratory WDL -- -- -- .WDL except;breath sounds --    Rhythm/Pattern, Respiratory -- labored;tachypneic -- -- --    Mucous Membranes -- dry;pink;intact -- -- --    Cough Frequency -- frequent -- -- --    Cough Type -- congested;nonproductive;good -- -- --       Breath Sounds    Breath Sounds -- All Fields -- All Fields --    All Lung Fields Breath Sounds -- diminished -- Anterior:;diminished --       Breath Sounds Post-Respiratory Treatment    Breath Sounds Posttreatment All Fields -- -- -- All Fields --    Breath Sounds Posttreatment All Fields -- -- -- no change --       Aerosol Therapy (SVN)    $ Mini Neb Initial -- -- -- yes --    Respiratory Treatment Status  (SVN) -- -- -- given --    Treatment Route (SVN) -- -- -- mouthpiece utilized;air --    Patient Position -- -- -- HOB elevated;semi-Contreras's --    Posttreatment Assessment (SVN) -- -- -- breath sounds unchanged --    Signs of Intolerance (SVN) -- -- -- none --    Row Name 05/08/22 0233                   Oxygen Therapy    SpO2 100 %        Pulse Oximetry Type Continuous        Device (Oxygen Therapy) nonrebreather mask        Flow (L/min) 15                  Vital Signs    Pulse 76        Heart Rate Source Monitor        Resp 24        Resp Rate Source Monitor        /56        BP Location Right arm        BP Method Automatic                  Physician Progress Notes (last 24 hours)  Notes from 05/07/22 1206 through 05/08/22 1206   No notes of this type exist for this encounter.         Consult Notes (last 24 hours)  Notes from 05/07/22 1207 through 05/08/22 1207   No notes of this type exist for this encounter.         Respiratory Therapy Notes (last 24 hours)  Notes from 05/07/22 1207 through 05/08/22 1207   No notes exist for this encounter.

## 2022-05-08 NOTE — CASE MANAGEMENT/SOCIAL WORK
Discharge Planning Assessment   Marc     Patient Name: Kezia Garcia  MRN: [id number]  Today's Date: 5/8/2022    Admit Date: 5/8/2022     Discharge Needs Assessment     Row Name 05/08/22 1312       Living Environment    People in Home facility resident    Current Living Arrangements residential facility    Primary Care Provided by other (see comments)    Provides Primary Care For no one, unable/limited ability to care for self    Able to Return to Prior Arrangements yes       Resource/Environmental Concerns    Resource/Environmental Concerns none    Transportation Concerns none       Transition Planning    Patient/Family Anticipates Transition to long-term care facility    Patient/Family Anticipated Services at Transition none    Transportation Anticipated health plan transportation       Discharge Needs Assessment    Readmission Within the Last 30 Days previous discharge plan unsuccessful    Concerns to be Addressed discharge planning    Anticipated Changes Related to Illness inability to care for self    Equipment Needed After Discharge none    Discharge Facility/Level of Care Needs nursing facility, intermediate    Current Discharge Risk dependent with mobility/activities of daily living               Discharge Plan     Row Name 05/08/22 1312       Plan    Plan Patient is a resident at [facility]. Completed readmission assessment. Plan is to return to facility if able, will need to follow up on a bedhold.              Continued Care and Services - Admitted Since 5/8/2022    Coordination has not been started for this encounter.          Demographic Summary     Row Name 05/08/22 1306       General Information    Admission Type inpatient    Arrived From long-term care    Reason for Consult discharge planning    Preferred Language English       Contact Information    Permission Granted to Share Info With family/designee    Contact Information Obtained for                 Functional Status     Row Name 05/08/22 1306       Functional Status    Usual Activity Tolerance fair    Current Activity Tolerance fair       Functional Status, IADL    Medications assistive person    Meal Preparation completely dependent    Housekeeping completely dependent    Laundry completely dependent    Shopping completely dependent       Mental Status    General Appearance WDL WDL       Mental Status Summary    Recent Changes in Mental Status/Cognitive Functioning mental status               Psychosocial    No documentation.                Abuse/Neglect    No documentation.                Legal    No documentation.                Substance Abuse    No documentation.                Patient Forms    No documentation.                   Mallika Dumont RN

## 2022-05-08 NOTE — ED PROVIDER NOTES
Time: 3:09 AM EDT    Chief Complaint: SOB    History of Present Illness:  Patient is a 81 y.o. year old female that presents to the emergency department with moderate SOB and low O2 sats today. Nursing home staff gave report that the pt's O2 sat was in the 80s today.     Pt has a hx of COPD and does not have home O2. Pt is A&0x1 at baseline.     History provided by: ED nurse.  History limited by:  Dementia  Recently seen: Pt recently admitted for seizures (has seizure hx).     Patient Care Team  Primary Care Provider: Laurent Blanco MD    Past Medical History:     Allergies   Allergen Reactions   • Erythromycin Anaphylaxis   • Bacitracin Unknown - High Severity   • Cefazolin Unknown - High Severity   • Cephalosporins Unknown - High Severity   • Neomycin Unknown - High Severity   • Penicillins Unknown - High Severity   • Polymox [Amoxicillin] Unknown - High Severity   • Prednisone Unknown - High Severity   • Quinolones Unknown - High Severity   • Statins Unknown - High Severity   • Sulfa Antibiotics Unknown - High Severity   • Trimethoprim Unknown - High Severity     Past Medical History:   Diagnosis Date   • Anemia    • COPD (chronic obstructive pulmonary disease) (HCC)    • Coronary artery disease    • Dementia (HCC)    • Disease of thyroid gland    • Hyperkalemia    • Hyperlipidemia    • Hypertension    • Seizures (HCC)      History reviewed. No pertinent surgical history.  History reviewed. No pertinent family history.    Home Medications:  Prior to Admission medications    Medication Sig Start Date End Date Taking? Authorizing Provider   acetaminophen (TYLENOL) 500 MG tablet Take 500 mg by mouth Every 6 (Six) Hours As Needed for Mild Pain  or Fever.    ProviderBranden MD   albuterol (PROVENTIL) (2.5 MG/3ML) 0.083% nebulizer solution Take 2.5 mg by nebulization Every 4 (Four) Hours As Needed for Wheezing or Shortness of Air.    ProviderBranden MD   amLODIPine (NORVASC) 5 MG tablet Take 0.5  "tablets by mouth Daily. 5/4/22   Tariq Hernandes DO   donepezil (ARICEPT) 10 MG tablet Take 10 mg by mouth Every Night.    Branden Marion MD   fenofibrate 160 MG tablet Take 160 mg by mouth Daily.    Branden Marion MD   Flaxseed, Linseed, (Flax Seed Oil) 1000 MG capsule Take 1 tablet by mouth Daily.    Branden Marion MD   furosemide (Lasix) 20 MG tablet Take 1 tablet by mouth Daily for 5 days. 5/5/22 5/10/22  Tariq Hernandes DO   guaiFENesin (MUCINEX) 600 MG 12 hr tablet Take 2 tablets by mouth 2 (Two) Times a Day. 5/4/22   Tariq Hernandes DO   ketoconazole (NIZORAL) 2 % cream Apply 1 application topically to the appropriate area as directed 2 (Two) Times a Day. To back left shoulder    Branden Marion MD   latanoprost (XALATAN) 0.005 % ophthalmic solution Administer 1 drop to both eyes Every Night.    Branden Marion MD   levETIRAcetam (KEPPRA) 500 MG tablet Take 500 mg by mouth 2 (Two) Times a Day.    Branden Marion MD   levothyroxine (SYNTHROID, LEVOTHROID) 300 MCG tablet Take 300 mcg by mouth Daily.    Branden Marion MD   Lidocaine HCl (LMX) 4 % cream Apply 1 application topically to the appropriate area as directed 2 (Two) Times a Day. To lower back    Branden Marion MD   loperamide (IMODIUM) 2 MG capsule Take 2 mg by mouth 4 (Four) Times a Day As Needed for Diarrhea.    Branden Marion MD   memantine (NAMENDA) 10 MG tablet Take 10 mg by mouth 2 (Two) Times a Day.    Branden Marion MD        Social History:   Social History     Tobacco Use   • Smoking status: Never Smoker   Substance Use Topics   • Alcohol use: Not Currently   • Drug use: Never       Record Review:  I have reviewed the patient's records in [employer].     Review of Systems:  Review of Systems   Unable to perform ROS: Dementia        Physical Exam:  /64   Pulse 64   Temp 96.9 °F (36.1 °C) (Axillary)   Resp 17   Ht 167.6 cm (66\")   Wt 82.3 kg (181 lb 7 oz)   SpO2 98%   BMI 29.28 " kg/m²     Physical Exam  Vitals and nursing note reviewed.   Constitutional:       General: She is not in acute distress.     Appearance: Normal appearance.   HENT:      Head: Normocephalic and atraumatic.      Nose: Nose normal.   Eyes:      General: No scleral icterus.  Cardiovascular:      Rate and Rhythm: Normal rate and regular rhythm.      Heart sounds: Normal heart sounds.   Pulmonary:      Effort: Pulmonary effort is normal. No respiratory distress.      Breath sounds: Rales (bilaterally) present.      Comments: Wet sounding cough.   Abdominal:      Palpations: Abdomen is soft.      Tenderness: There is no abdominal tenderness.   Musculoskeletal:         General: Normal range of motion.      Cervical back: Neck supple.      Right lower leg: No edema.      Left lower leg: No edema.   Skin:     General: Skin is warm and dry.   Neurological:      General: No focal deficit present.      Mental Status: She is alert.      Comments: oriented x1                 Medications in the Emergency Department:  Medications   sodium chloride 0.9 % flush 10 mL (has no administration in time range)   sodium chloride 0.9 % flush 10 mL (has no administration in time range)   sodium chloride 0.9 % flush 10 mL (has no administration in time range)   sennosides-docusate (PERICOLACE) 8.6-50 MG per tablet 2 tablet (has no administration in time range)     And   polyethylene glycol (MIRALAX) packet 17 g (has no administration in time range)     And   bisacodyl (DULCOLAX) EC tablet 5 mg (has no administration in time range)     And   bisacodyl (DULCOLAX) suppository 10 mg (has no administration in time range)   ondansetron (ZOFRAN) tablet 4 mg (has no administration in time range)   meropenem (MERREM) 1 g/100 mL 0.9% NS (mbp) (has no administration in time range)   Pharmacy to dose vancomycin (has no administration in time range)   vancomycin 750 mg/250 mL 0.9% NS IVPB ([facility]) (has no administration in time range)   acetaminophen  (TYLENOL) tablet 500 mg (has no administration in time range)   albuterol (PROVENTIL) nebulizer solution 0.083% 2.5 mg/3mL (has no administration in time range)   amLODIPine (NORVASC) tablet 2.5 mg (has no administration in time range)   donepezil (ARICEPT) tablet 10 mg (has no administration in time range)   levETIRAcetam (KEPPRA) tablet 500 mg (has no administration in time range)   levothyroxine (SYNTHROID, LEVOTHROID) tablet 300 mcg (has no administration in time range)   memantine (NAMENDA) tablet 10 mg (has no administration in time range)   sodium chloride 0.9 % bolus 1,000 mL (0 mL Intravenous Stopped 5/8/22 0658)   meropenem (MERREM) 1 g/100 mL 0.9% NS (mbp) (0 g Intravenous Stopped 5/8/22 0436)   vancomycin 1750 mg/500 mL 0.9% NS IVPB ([facility]) (0 mg/kg × 82.3 kg Intravenous Stopped 5/8/22 0653)   ipratropium-albuterol (DUO-NEB) nebulizer solution 3 mL (3 mL Nebulization Given 5/8/22 0333)        Labs  Lab Results (last 24 hours)     Procedure Component Value Units Date/Time    CBC & Differential [[id number]]  (Abnormal) Collected: 05/08/22 0319    Specimen: Blood from Arm, Left Updated: 05/08/22 0326    Narrative:      The following orders were created for panel order CBC & Differential.  Procedure                               Abnormality         Status                     ---------                               -----------         ------                     CBC Auto Differential[[id number]]        Abnormal            Final result                 Please view results for these tests on the individual orders.    Comprehensive Metabolic Panel [[id number]]  (Abnormal) Collected: 05/08/22 0319    Specimen: Blood from Arm, Left Updated: 05/08/22 0346     Glucose 130 mg/dL      BUN 19 mg/dL      Creatinine 0.91 mg/dL      Sodium 140 mmol/L      Potassium 3.4 mmol/L      Chloride 106 mmol/L      CO2 21.5 mmol/L      Calcium 9.1 mg/dL      Total Protein 7.2 g/dL      Albumin 3.50 g/dL      ALT (SGPT) 13 U/L      AST  (SGOT) 15 U/L      Alkaline Phosphatase 45 U/L      Total Bilirubin 0.3 mg/dL      Globulin 3.7 gm/dL      A/G Ratio 0.9 g/dL      BUN/Creatinine Ratio 20.9     Anion Gap 12.5 mmol/L      eGFR 64.3 mL/min/1.73      Comment: National Kidney Foundation and American Society of Nephrology (ASN) Task Force recommended calculation based on the Chronic Kidney Disease Epidemiology Collaboration (CKD-EPI) equation refit without adjustment for race.       Narrative:      GFR Normal >60  Chronic Kidney Disease <60  Kidney Failure <15      BNP [[id number]]  (Normal) Collected: 05/08/22 0319    Specimen: Blood from Arm, Left Updated: 05/08/22 0344     proBNP 816.1 pg/mL     Narrative:      Among patients with dyspnea, NT-proBNP is highly sensitive for the detection of acute congestive heart failure. In addition NT-proBNP of <300 pg/ml effectively rules out acute congestive heart failure with 99% negative predictive value.    Results may be falsely decreased if patient taking Biotin.      Troponin [[id number]]  (Normal) Collected: 05/08/22 0319    Specimen: Blood from Arm, Left Updated: 05/08/22 0346     Troponin T <0.010 ng/mL     Narrative:      Troponin T Reference Range:  <= 0.03 ng/mL-   Negative for AMI  >0.03 ng/mL-     Abnormal for myocardial necrosis.  Clinicians would have to utilize clinical acumen, EKG, Troponin and serial changes to determine if it is an Acute Myocardial Infarction or myocardial injury due to an underlying chronic condition.       Results may be falsely decreased if patient taking Biotin.      CBC Auto Differential [[id number]]  (Abnormal) Collected: 05/08/22 0319    Specimen: Blood from Arm, Left Updated: 05/08/22 0326     WBC 9.60 10*3/mm3      RBC 3.30 10*6/mm3      Hemoglobin 10.6 g/dL      Hematocrit 32.3 %      MCV 97.9 fL      MCH 32.1 pg      MCHC 32.8 g/dL      RDW 14.2 %      RDW-SD 50.7 fl      MPV 10.5 fL      Platelets 307 10*3/mm3      Neutrophil % 67.3 %      Lymphocyte % 23.6 %       Monocyte % 5.8 %      Eosinophil % 1.8 %      Basophil % 0.4 %      Immature Grans % 1.1 %      Neutrophils, Absolute 6.45 10*3/mm3      Lymphocytes, Absolute 2.27 10*3/mm3      Monocytes, Absolute 0.56 10*3/mm3      Eosinophils, Absolute 0.17 10*3/mm3      Basophils, Absolute 0.04 10*3/mm3      Immature Grans, Absolute 0.11 10*3/mm3      nRBC 0.0 /100 WBC     Blood Culture - Blood, Arm, Left [[id number]] Collected: 05/08/22 0320    Specimen: Blood from Arm, Left Updated: 05/08/22 0323    Lactic Acid, Plasma [[id number]]  (Normal) Collected: 05/08/22 0320    Specimen: Blood from Arm, Left Updated: 05/08/22 0343     Lactate 2.0 mmol/L            Imaging:  XR Chest 1 View    Result Date: 5/8/2022  PROCEDURE: XR CHEST 1 VW  COMPARISON: [facility], , XR CHEST 1 VW, 4/30/2022, 14:45.  INDICATIONS: SHORTNESS OF BREATH.  FINDINGS: A single AP upright portable chest radiograph was performed.  There are low lung volumes.  There may be mild subsegmental atelectasis in the lung bases.  Borderline cardiac enlargement is possible.  There is a partially visualized distal limb of a ventriculoperitoneal () shunt projected over the right neck, right chest, and upper right abdomen.  The thoracic aorta is atherosclerotic.  No pneumothorax is seen.  The previously seen implantable loop recorder (ILR), projected over the left lateral costophrenic sulcus, is not clearly identified on the current study.       Probably no acute infiltrate is identified with pulmonary hypoinflation.      COMMENT:  Part of this note is an electronic transcription of spoken language to printed text. The electronic translation/transcription may permit erroneous, or at times, nonsensical (or even sensical) words or phrases to be inadvertently transcribed or omitted; this  has reviewed the note for such errors (as well as additional errors); however, some may still exist.  CATHY THAPA JR, MD       Electronically Signed and Approved  By: CATHY THAPA JR, MD on 5/08/2022 at 2:57                Procedures:  Procedures    Progress                            Medical Decision Making:  MDM  Number of Diagnoses or Management Options  Diagnosis management comments: Sepsis criteria was met in the emergency department and the Sepsis protocol (including antibiotic administration) was initiated.      SIRS criteria considered:   1.  Temperature > 100.4 or <98.6    2.  Heart Rate > 90    3.  Respiratory Rate > 22    4.  WBC > 12K or <4K.             Severe Sepsis:     Respiratory: Mechanical Ventilation or Bipap  Hypotension: SBP > 90 or MAP < 65  Renal: Creatinine > 2  Metabolic: Lactic Acid > 2  Hematologic: Platelets < 100K or INR > 1.5  Hepatic: BILI  >  2  CNS: Sudden AMS     Septic Shock:     Severe Sepsis + Persistent hypotension or Lactic Acid > 4     Normal saline bolus, Antibiotics, and final disposition was based on these definitions.        Sepsis was recognized at 03:21 EDT     Antibiotics were ordered.     30 cc/kg bolus was not indicated.       Patient did not receive the recommended 30ml/kg fluid bolus for sepsis because it would be harmful or detrimental to the patient.    The patient has concern for fluid overload.   The patient was ordered 1 L of fluids.    Total Critical Care time of 35 minutes. Total critical care time documented does not include time spent on separately billed procedures for services of nurses or physician assistants. I personally saw and examined the patient. I have reviewed all diagnostic interpretations and treatment plans as written. I was present for the key portions of any procedures performed and the inclusive time noted in any critical care statement. Critical care time includes patient management by me, time spent at the patients bedside,  time to review lab and imaging results, discussing patient care, documentation in the medical record, and time spent with family or caregiver.        Final diagnoses:    Pneumonia due to infectious organism, unspecified laterality, unspecified part of lung   Sepsis, due to unspecified organism, unspecified whether acute organ dysfunction present (HCC)   Acute respiratory failure with hypoxia (HCC)        Disposition:  ED Disposition     ED Disposition   Decision to Admit    Condition   --    Comment   Level of Care: Telemetry [5]   Diagnosis: Pneumonia due to infectious organism, unspecified laterality, unspecified part of lung [[id number]]   Admitting Physician: WATSON VALERO [[id number]]   Attending Physician: WATSON VALERO [[id number]]   Isolate for COVID?: Yes [1]   Certification: I Certify That Inpatient Hospital Services Are Medically Necessary For Greater Than 2 Midnights               Dictated Utilizing Dragon Dictation    Documentation assistance provided by Rosemarie Rodriguez acting as scribe for Severino Peraza MD. Information recorded by the scribe was done at my direction and has been verified and validated by me.      Rosemarie Rodriguez  05/08/22 0318       Rosemarie Rodriguez  05/08/22 0321       Severino Peraza MD  05/08/22 0706

## 2022-05-08 NOTE — PLAN OF CARE
Problem: Adult Inpatient Plan of Care  Goal: Plan of Care Review  Outcome: Ongoing, Progressing  Flowsheets (Taken 5/8/2022 3779)  Progress: no change  Plan of Care Reviewed With: daughter  Outcome Evaluation: No significant change in patient status since arrival to floor. VSS. Iv antibiotics given per MAR. Diet adjusted per orders. Will continue to monitor patient and update chart as needed.   Goal Outcome Evaluation:  Plan of Care Reviewed With: daughter        Progress: no change  Outcome Evaluation: No significant change in patient status since arrival to floor. VSS. Iv antibiotics given per MAR. Diet adjusted per orders. Will continue to monitor patient and update chart as needed.

## 2022-05-08 NOTE — H&P
[facility] HISTORY AND PHYSICAL  Date: 2022   Patient Name: Kezia Garcia  : 1940  MRN: [id number]  Primary Care Physician:  Laurent Blanco MD  Date of admission: 2022    Subjective   Subjective     Chief Complaint:     HPI: Kezia Garcia is a 81 y.o. female vomiting from nursing home.  Noticed patient was discharged from our hospital 4 days ago she comes back with increased shortness of breath and difficulty breathing.  Patient has a history of seizure disorder COPD hypothyroidism dementia.  She was found to be in moderate respiratory distress today.  Patient is unable to provide a history on arrival she was tachypneic and very congested with decreased breath sounds and crackles.  Patient had a recent hospitalization on  also for difficulty handling secretions she had the same presentation then.      Personal History   ROS     Constitutional: No fever, unintentional weight loss, malaise  HEENT: No vision changes, loss of vision, double vision, difficulty swallowing, painful swallowing, or tinnitus  Respiratory: No cough, shortness of breath, sputum production, or hemoptysis  Cardiovascular: No chest pain, palpitations, orthopnea, or paroxysmal nocturnal dyspnea  Gastrointestinal: No nausea, vomiting, diarrhea, hematochezia, bright red blood per rectum, dark tarry stools, bowel incontinence or constipation  Genitourinary: No dysuria, hematuria, bladder incontinence, frequency, nocturia, or hesitancy  Musculoskeletal: No arthritis, joint swelling, deformities or joint pain  Endocrine: No fatigue, heat or cold intolerance  Hematologic: No excessive bruising or bleeding  Psychiatric: No Anxiety or depression  Neurologic: No Confusion, numbness, or weakness  Skin: No rash or open wounds         PMH  Past Medical History:   Diagnosis Date   • Anemia    • COPD (chronic obstructive pulmonary disease) (HCC)    • Coronary artery disease    • Dementia (HCC)    • Disease of  thyroid gland    • Hyperkalemia    • Hyperlipidemia    • Hypertension    • Seizures (HCC)          PSH  History reviewed. No pertinent surgical history.    FH  History reviewed. No pertinent family history.    SOCIAL HISTORY   reports that she has never smoked. She does not have any smokeless tobacco history on file. She reports previous alcohol use. She reports that she does not use drugs.     ALLERGIES   Allergies   Allergen Reactions   • Erythromycin Anaphylaxis   • Bacitracin Unknown - High Severity   • Cefazolin Unknown - High Severity   • Cephalosporins Unknown - High Severity   • Neomycin Unknown - High Severity   • Penicillins Unknown - High Severity   • Polymox [Amoxicillin] Unknown - High Severity   • Prednisone Unknown - High Severity   • Quinolones Unknown - High Severity   • Statins Unknown - High Severity   • Sulfa Antibiotics Unknown - High Severity   • Trimethoprim Unknown - High Severity       HOME MEDICINES  Prior to Admission Medications   Prescriptions Last Dose Informant Patient Reported? Taking?   Flaxseed, Linseed, (Flax Seed Oil) 1000 MG capsule 5/7/2022 at Unknown time  Yes Yes   Sig: Take 1 tablet by mouth Daily.   Lidocaine HCl (LMX) 4 % cream 5/7/2022 at Unknown time  Yes Yes   Sig: Apply 1 application topically to the appropriate area as directed 2 (Two) Times a Day. To lower back   acetaminophen (TYLENOL) 500 MG tablet 5/7/2022 at Unknown time  Yes Yes   Sig: Take 500 mg by mouth Every 6 (Six) Hours As Needed for Mild Pain  or Fever.   albuterol (PROVENTIL) (2.5 MG/3ML) 0.083% nebulizer solution 5/7/2022 at Unknown time  Yes Yes   Sig: Take 2.5 mg by nebulization Every 4 (Four) Hours As Needed for Wheezing or Shortness of Air.   amLODIPine (NORVASC) 5 MG tablet 5/7/2022 at Unknown time  No Yes   Sig: Take 0.5 tablets by mouth Daily.   donepezil (ARICEPT) 10 MG tablet 5/7/2022 at Unknown time  Yes Yes   Sig: Take 10 mg by mouth Every Night.   fenofibrate 160 MG tablet 5/7/2022 at Unknown  time  Yes Yes   Sig: Take 160 mg by mouth Daily.   furosemide (Lasix) 20 MG tablet 5/7/2022 at Unknown time  No Yes   Sig: Take 1 tablet by mouth Daily for 5 days.   guaiFENesin (MUCINEX) 600 MG 12 hr tablet 5/7/2022 at Unknown time  No Yes   Sig: Take 2 tablets by mouth 2 (Two) Times a Day.   ketoconazole (NIZORAL) 2 % cream 5/7/2022 at Unknown time  Yes Yes   Sig: Apply 1 application topically to the appropriate area as directed 2 (Two) Times a Day. To back left shoulder   latanoprost (XALATAN) 0.005 % ophthalmic solution 5/7/2022 at Unknown time  Yes Yes   Sig: Administer 1 drop to both eyes Every Night.   levETIRAcetam (KEPPRA) 500 MG tablet 5/7/2022 at Unknown time  Yes Yes   Sig: Take 500 mg by mouth 2 (Two) Times a Day.   levothyroxine (SYNTHROID, LEVOTHROID) 300 MCG tablet 5/7/2022 at Unknown time  Yes Yes   Sig: Take 300 mcg by mouth Daily.   loperamide (IMODIUM) 2 MG capsule 5/7/2022 at Unknown time  Yes Yes   Sig: Take 2 mg by mouth 4 (Four) Times a Day As Needed for Diarrhea.   memantine (NAMENDA) 10 MG tablet 5/7/2022 at Unknown time  Yes Yes   Sig: Take 10 mg by mouth 2 (Two) Times a Day.      Facility-Administered Medications: None                     Objective     Vitals:   Temp:  [96.9 °F (36.1 °C)] 96.9 °F (36.1 °C)  Heart Rate:  [60-86] 61  Resp:  [16-24] 17  BP: (123-138)/(50-90) 123/52  Flow (L/min):  [3-15] 3    Physical Exam  Vital signs were reviewed.  General appearance - Patient appears well-developed and well-nourished.    Head - Normocephalic, atraumatic.  Pupils - Equal, round, reactive to light.  Extraocular muscles are intact.  Conjunctiva is clear  Oral mucosa - Pink and moist without lesions or erythema.  Uvula is midline.  Neck - Supple.  Trachea was midline.  There is no palpable lymphadenopathy or thyromegaly.  There are no meningeal signs  Lungs Decreased breath sounds with crackles and congestion.   -Breath sounds equal bilateral.  No crackles no rales.  Heart -Regular rate and  rhythm no murmurs no gallops.  Abdomen -     There is no rebound, guarding, or rigidity.  There are no palpable masses.  There are no pulsatile masses.  Back - Spine is straight and midline.  There is no CVA tenderness.  Extremities - Intact x4 with full range of motion.  There is no palpable edema.  Neurologic - Cranial nerves II through XII are grossly intact.    Integument - There are no rashes.  There are no petechia or purpura lesions noted.  There are no vesicular lesions noted.           Assessment / Plan     Assessment/Plan:   Acute respiratory failure  Hospital-acquired pneumonia  History of seizures  Acute COPD  Hypothyroidism  Hypertension  History of CVA    PLAN  Patient has had multiple hospitalizations for inability to handle secretions recurrent pneumonia  -- Patient had a palliative care consult on 5/1/2022 consult Per records it looks like conversation was initiated with her daughter with no return of phone call.    Pneumonia  -- I will start patient on antibiotics broad-spectrum antibiotic including meropenem vancomycin  --obtain strep normal mycoplasma legionella and fungal cultures.  --keep patient on supplemental oxygen as needed  --Cultures were obtained on first dose of antibiotic was given within 30 minutes.  --Chest x-ray was obtained and reviewed.  --Had an evaluation by speech that recommended aspiration precaution but was negative for aspiration.     Seizures  --Resume seizure meds     Hypertension  -- Amlodipine    Hypothyroidism  -- Home Synthroid.    DVT prophylaxis:  Mechanical DVT prophylaxis orders are present.    CODE STATUS:       Result Review:      [x]  Laboratory  [x]  Microbiology  [x]  Radiology  [x]  EKG/Telemetry   []  Cardiology/Vascular   []  Pathology  []  Old records  []  Other:    Admission Status:  I believe this patient meets inpatient status.    Electronically signed by Ashleigh Jarrell MD, 05/08/22, 6:28 AM EDT.

## 2022-05-08 NOTE — ED NOTES
This rn changed pt's brief after pt had bowel movement. Pt linens changed and pt placed in position of comfort. Stool was soft and light brown in color

## 2022-05-08 NOTE — PROGRESS NOTES
"Pharmacy to Dose Vancomycin Day: 1    Kezia Garcia is a 81 y.o.female admitted with shortness of breath. Pharmacy has been consulted to dose IV Vancomycin     Consulting Provider: Dr. Jarrell  Clinical Indication: pneumonia   Pertinent Past Medical History:   COPD  Nursing home resident  Recent hospital admission- discharged 5/4  Goal -600 mg/L.hr  Duration of therapy: 7 days     167.6 cm (66\")       05/08/22  0233      Weight: 82.3 kg (181 lb 7 oz)         Estimated Creatinine Clearance: 54.2 mL/min (by C-G formula based on SCr of 0.91 mg/dL).  Results from last 7 days  Lab  Units  05/08/22  0319  05/04/22  0555  05/03/22  0509  05/02/22  1143  BUN  mg/dL  19 21 19 19  CREATININE  mg/dL  0.91  0.71  0.74  0.77      HD/PD/CRRT?: No    Lab Results   Component Value Date    WBC 9.60 05/08/2022      Temperature    05/08/22 0238   Temp: 96.9 °F (36.1 °C)        Contrast Administered: No    Relevant Micro:   Microbiology Results (last 10 days)       Procedure Component Value - Date/Time    MRSA Screen, PCR (Inpatient) - Swab, Nares [[id number]]  (Abnormal) Collected: 05/02/22 1252    Lab Status: Final result Specimen: Swab from Nares Updated: 05/02/22 1625     MRSA PCR MRSA Detected    Urine Culture - Urine, Urine, Catheter [[id number]]  (Normal) Collected: 04/30/22 2104    Lab Status: Final result Specimen: Urine, Catheter Updated: 05/01/22 2006     Urine Culture No growth    Influenza Antigen, Rapid - Swab, Nasopharynx [[id number]]  (Normal) Collected: 04/30/22 1447    Lab Status: Final result Specimen: Swab from Nasopharynx Updated: 04/30/22 1546     Influenza A Ag, EIA Negative     Influenza B Ag, EIA Negative    COVID-19,APTIMA PANTHER(USHA),[facility], NP/OP SWAB IN UTM/VTM/[facility]SPORT MEDIA,24 HR TAT - Swab, Nasopharynx [[id number]]  (Normal) Collected: 04/30/22 1447    Lab Status: Final result Specimen: Swab from Nasopharynx Updated: 04/30/22 2152     COVID19 Not Detected    Narrative:      Fact " sheet for providers: https://www.fda.gov/media/319810/download     Fact sheet for patients: https://www.fda.gov/media/094682/download    Test performed by RT PCR.    Blood Culture - Blood, Arm, Left [[id number]]  (Normal) Collected: 04/30/22 1429    Lab Status: Final result Specimen: Blood from Arm, Left Updated: 05/05/22 1504     Blood Culture No growth at 5 days    Blood Culture - Blood, Arm, Left [[id number]]  (Normal) Collected: 04/30/22 1429    Lab Status: Final result Specimen: Blood from Arm, Left Updated: 05/05/22 1504     Blood Culture No growth at 5 days             Relevant Radiology:   Chest Xray: Probably no acute infiltrate is identified with pulmonary hypoinflation.    Other Antimicrobial Therapy: Merrem    Assessment/Plan  Loading dose: Vancomycin 1750 mg   Regimen: Vancomycin 750 mg every 12 hours    Patient admitted with shortness of breath started on vancomycin and Merrem. Vancomycin 1750 mg administered as lading dose, followed by Vancomycin 750 mg every 12 hours for a predicted AUCss 533 mg/L.hr. Vancomycin level ordered 5/9 to assess clearance     MRSA PCR positive on 5/2  Labs ordered: [facility] 5/9    Thank you for the consult.  Magalis Gamez, FranciaD

## 2022-05-09 LAB
ALBUMIN SERPL-MCNC: 3.3 G/DL (ref 3.5–5.2)
ALBUMIN/GLOB SERPL: 0.9 G/DL
ALP SERPL-CCNC: 46 U/L (ref 39–117)
ALT SERPL W P-5'-P-CCNC: 13 U/L (ref 1–33)
ANION GAP SERPL CALCULATED.3IONS-SCNC: 13 MMOL/L (ref 5–15)
AST SERPL-CCNC: 22 U/L (ref 1–32)
BASOPHILS # BLD AUTO: 0.03 10*3/MM3 (ref 0–0.2)
BASOPHILS NFR BLD AUTO: 0.5 % (ref 0–1.5)
BILIRUB SERPL-MCNC: 0.3 MG/DL (ref 0–1.2)
BUN SERPL-MCNC: 11 MG/DL (ref 8–23)
BUN/CREAT SERPL: 16.4 (ref 7–25)
CALCIUM SPEC-SCNC: 8.8 MG/DL (ref 8.6–10.5)
CHLORIDE SERPL-SCNC: 107 MMOL/L (ref 98–107)
CHOLEST SERPL-MCNC: 135 MG/DL (ref 0–200)
CO2 SERPL-SCNC: 18 MMOL/L (ref 22–29)
CREAT SERPL-MCNC: 0.67 MG/DL (ref 0.57–1)
DEPRECATED RDW RBC AUTO: 50.2 FL (ref 37–54)
EGFRCR SERPLBLD CKD-EPI 2021: 89 ML/MIN/1.73
EOSINOPHIL # BLD AUTO: 0.15 10*3/MM3 (ref 0–0.4)
EOSINOPHIL NFR BLD AUTO: 2.3 % (ref 0.3–6.2)
ERYTHROCYTE [DISTWIDTH] IN BLOOD BY AUTOMATED COUNT: 13.9 % (ref 12.3–15.4)
GLOBULIN UR ELPH-MCNC: 3.7 GM/DL
GLUCOSE SERPL-MCNC: 89 MG/DL (ref 65–99)
HBA1C MFR BLD: 5.6 % (ref 4.8–5.6)
HCT VFR BLD AUTO: 34.4 % (ref 34–46.6)
HDLC SERPL-MCNC: 38 MG/DL (ref 40–60)
HGB BLD-MCNC: 11 G/DL (ref 12–15.9)
IMM GRANULOCYTES # BLD AUTO: 0.05 10*3/MM3 (ref 0–0.05)
IMM GRANULOCYTES NFR BLD AUTO: 0.8 % (ref 0–0.5)
INR PPP: 1.12 (ref 0.86–1.15)
LDLC SERPL CALC-MCNC: 76 MG/DL (ref 0–100)
LDLC/HDLC SERPL: 1.94 {RATIO}
LYMPHOCYTES # BLD AUTO: 1.7 10*3/MM3 (ref 0.7–3.1)
LYMPHOCYTES NFR BLD AUTO: 26.4 % (ref 19.6–45.3)
MAGNESIUM SERPL-MCNC: 2 MG/DL (ref 1.6–2.4)
MCH RBC QN AUTO: 31.4 PG (ref 26.6–33)
MCHC RBC AUTO-ENTMCNC: 32 G/DL (ref 31.5–35.7)
MCV RBC AUTO: 98.3 FL (ref 79–97)
MONOCYTES # BLD AUTO: 0.49 10*3/MM3 (ref 0.1–0.9)
MONOCYTES NFR BLD AUTO: 7.6 % (ref 5–12)
NEUTROPHILS NFR BLD AUTO: 4.01 10*3/MM3 (ref 1.7–7)
NEUTROPHILS NFR BLD AUTO: 62.4 % (ref 42.7–76)
NRBC BLD AUTO-RTO: 0 /100 WBC (ref 0–0.2)
PHOSPHATE SERPL-MCNC: 2.7 MG/DL (ref 2.5–4.5)
PLATELET # BLD AUTO: 260 10*3/MM3 (ref 140–450)
PMV BLD AUTO: 11.7 FL (ref 6–12)
POTASSIUM SERPL-SCNC: 3.9 MMOL/L (ref 3.5–5.2)
PROT SERPL-MCNC: 7 G/DL (ref 6–8.5)
PROTHROMBIN TIME: 14.5 SECONDS (ref 11.8–14.9)
RBC # BLD AUTO: 3.5 10*6/MM3 (ref 3.77–5.28)
SODIUM SERPL-SCNC: 138 MMOL/L (ref 136–145)
TRIGL SERPL-MCNC: 116 MG/DL (ref 0–150)
VANCOMYCIN SERPL-MCNC: 12.8 MCG/ML (ref 5–40)
VLDLC SERPL-MCNC: 21 MG/DL (ref 5–40)
WBC NRBC COR # BLD: 6.43 10*3/MM3 (ref 3.4–10.8)

## 2022-05-09 PROCEDURE — 80061 LIPID PANEL: CPT | Performed by: GENERAL PRACTICE

## 2022-05-09 PROCEDURE — 25010000002 ENOXAPARIN PER 10 MG: Performed by: INTERNAL MEDICINE

## 2022-05-09 PROCEDURE — 80053 COMPREHEN METABOLIC PANEL: CPT | Performed by: GENERAL PRACTICE

## 2022-05-09 PROCEDURE — 36415 COLL VENOUS BLD VENIPUNCTURE: CPT | Performed by: GENERAL PRACTICE

## 2022-05-09 PROCEDURE — 94799 UNLISTED PULMONARY SVC/PX: CPT

## 2022-05-09 PROCEDURE — 85610 PROTHROMBIN TIME: CPT | Performed by: GENERAL PRACTICE

## 2022-05-09 PROCEDURE — 92610 EVALUATE SWALLOWING FUNCTION: CPT

## 2022-05-09 PROCEDURE — 83036 HEMOGLOBIN GLYCOSYLATED A1C: CPT | Performed by: GENERAL PRACTICE

## 2022-05-09 PROCEDURE — 80202 ASSAY OF VANCOMYCIN: CPT | Performed by: GENERAL PRACTICE

## 2022-05-09 PROCEDURE — 94760 N-INVAS EAR/PLS OXIMETRY 1: CPT

## 2022-05-09 PROCEDURE — 25010000002 VANCOMYCIN 5 G RECONSTITUTED SOLUTION: Performed by: GENERAL PRACTICE

## 2022-05-09 PROCEDURE — 84100 ASSAY OF PHOSPHORUS: CPT | Performed by: GENERAL PRACTICE

## 2022-05-09 PROCEDURE — 99232 SBSQ HOSP IP/OBS MODERATE 35: CPT | Performed by: INTERNAL MEDICINE

## 2022-05-09 PROCEDURE — 85025 COMPLETE CBC W/AUTO DIFF WBC: CPT | Performed by: GENERAL PRACTICE

## 2022-05-09 PROCEDURE — 83735 ASSAY OF MAGNESIUM: CPT | Performed by: GENERAL PRACTICE

## 2022-05-09 RX ORDER — IPRATROPIUM BROMIDE AND ALBUTEROL SULFATE 2.5; .5 MG/3ML; MG/3ML
3 SOLUTION RESPIRATORY (INHALATION)
Status: DISCONTINUED | OUTPATIENT
Start: 2022-05-09 | End: 2022-05-10 | Stop reason: HOSPADM

## 2022-05-09 RX ORDER — ENOXAPARIN SODIUM 100 MG/ML
40 INJECTION SUBCUTANEOUS DAILY
Status: DISCONTINUED | OUTPATIENT
Start: 2022-05-09 | End: 2022-05-10 | Stop reason: HOSPADM

## 2022-05-09 RX ADMIN — LEVETIRACETAM 500 MG: 500 TABLET, FILM COATED ORAL at 21:08

## 2022-05-09 RX ADMIN — VANCOMYCIN HYDROCHLORIDE 750 MG: 5 INJECTION, POWDER, LYOPHILIZED, FOR SOLUTION INTRAVENOUS at 04:24

## 2022-05-09 RX ADMIN — DONEPEZIL HYDROCHLORIDE 10 MG: 10 TABLET ORAL at 21:08

## 2022-05-09 RX ADMIN — IPRATROPIUM BROMIDE AND ALBUTEROL SULFATE 3 ML: 2.5; .5 SOLUTION RESPIRATORY (INHALATION) at 14:15

## 2022-05-09 RX ADMIN — MEMANTINE 10 MG: 10 TABLET ORAL at 21:08

## 2022-05-09 RX ADMIN — Medication 10 ML: at 10:11

## 2022-05-09 RX ADMIN — IPRATROPIUM BROMIDE AND ALBUTEROL SULFATE 3 ML: 2.5; .5 SOLUTION RESPIRATORY (INHALATION) at 19:51

## 2022-05-09 RX ADMIN — ENOXAPARIN SODIUM 40 MG: 100 INJECTION SUBCUTANEOUS at 13:48

## 2022-05-09 NOTE — CONSULTS
"Nutrition Services    Patient Name: Kezia Garcia  YOB: 1940  MRN: [id number]  Admission date: 5/8/2022      CLINICAL NUTRITION ASSESSMENT      Reason for Assessment  Physician consult     H&P:    Past Medical History:   Diagnosis Date   • Anemia    • COPD (chronic obstructive pulmonary disease) (HCC)    • Coronary artery disease    • Dementia (HCC)    • Disease of thyroid gland    • Hyperkalemia    • Hyperlipidemia    • Hypertension    • Seizures (HCC)         Current Problems:   Active Hospital Problems    Diagnosis    • Pneumonia due to infectious organism, unspecified laterality, unspecified part of lung         Nutrition/Diet History         Narrative     Patient admitted for difficulty breathing and vomiting from nursing facility. Was discharged from hospital 4 days ago. Patient currently NPO until speech therapy evaluation.        Anthropometrics        Current Height, Weight Height: 167.6 cm (65.98\")  Weight: 76.6 kg (168 lb 14 oz)   Current BMI Body mass index is 27.27 kg/m².       Weight Hx  Wt Readings from Last 30 Encounters:   05/08/22 1151 76.6 kg (168 lb 14 oz)   05/08/22 0233 82.3 kg (181 lb 7 oz)   04/30/22 2302 74.4 kg (164 lb 0.4 oz)   04/30/22 1332 79.3 kg (174 lb 13.2 oz)   03/05/22 1930 79 kg (174 lb 2.6 oz)   12/25/21 1420 80.9 kg (178 lb 5.6 oz)   04/26/19 0000 101 kg (222 lb 6 oz)   04/15/19 0000 102 kg (225 lb 2 oz)   04/10/19 0000 101 kg (222 lb)   03/08/19 0817 102 kg (223 lb 15.8 oz)   01/25/19 0924 103 kg (226 lb 13.7 oz)   01/10/19 0000 99.3 kg (219 lb)   11/14/18 1019 100 kg (220 lb 7.4 oz)   10/15/18 0000 97.7 kg (215 lb 8 oz)   07/06/18 0000 94.8 kg (209 lb)   06/18/18 0000 94.9 kg (209 lb 2 oz)   06/15/18 0000 98.9 kg (218 lb)   06/06/18 0000 98 kg (216 lb)   04/19/18 0000 98.9 kg (218 lb)   01/30/18 0000 93.9 kg (207 lb)   01/29/18 0000 94.5 kg (208 lb 4 oz)   01/18/18 0000 95.3 kg (210 lb)            Wt Change Observation 5% loss in the past 5 months "     Estimated/Assessed Needs       Energy Requirements    EST Needs (kcal/day) 2095-3985 kcal/d       Protein Requirements    EST Daily Needs (g/day) 77-92 gm/d       Fluid Requirements     Estimated Needs (mL/day) 8384-6543 ml/d     Labs/Medications         Pertinent Labs Reviewed.   Results from last 7 days   Lab Units 05/09/22  0624 05/08/22  0911 05/08/22  0319   SODIUM mmol/L 138 141 140   POTASSIUM mmol/L 3.9 3.6 3.4*   CHLORIDE mmol/L 107 108* 106   CO2 mmol/L 18.0* 22.7 21.5*   BUN mg/dL 11 17 19   CREATININE mg/dL 0.67 0.79 0.91   CALCIUM mg/dL 8.8 8.6 9.1   BILIRUBIN mg/dL 0.3  --  0.3   ALK PHOS U/L 46  --  45   ALT (SGPT) U/L 13  --  13   AST (SGOT) U/L 22  --  15   GLUCOSE mg/dL 89 92 130*     Results from last 7 days   Lab Units 05/09/22  0624 05/08/22  0911   MAGNESIUM mg/dL 2.0 2.0   PHOSPHORUS mg/dL 2.7 3.2   HEMOGLOBIN g/dL 11.0* 10.2*   HEMATOCRIT % 34.4 31.2*   TRIGLYCERIDES mg/dL 116  --      COVID19   Date Value Ref Range Status   05/08/2022 Not Detected Not Detected - Ref. Range Final     Lab Results   Component Value Date    HGBA1C 5.7 06/04/2019         Pertinent Medications Reviewed.     Current Nutrition Orders & Evaluation of Intake       Oral Nutrition     Current PO Diet NPO Diet NPO Type: Strict NPO   Supplement No active supplement orders       Nutrition Diagnosis         Nutrition Dx Problem 1 Inadequate nutrient intake related to inadequate oral Intake as evidenced by NPO       Nutrition Intervention         Advance diet when medically able after speech evaluation. RD will continue to follow to monitor diet advancement and oral intake.        Monitor/Evaluation        Monitor Per protocol, Pertinent labs, Weight, Skin status, GI status, Swallow function, Diet advancement       Nutrition Discharge Plan         To be determined       Electronically signed by:  Jenny Burden RD  05/09/22 08:55 EDT

## 2022-05-09 NOTE — THERAPY EVALUATION
Acute Care - Speech Language Pathology   Swallow Initial Evaluation  Marc     Patient Name: Kezia Garcia  : 1940  MRN: [id number]  Today's Date: 2022               Admit Date: 2022    Visit Dx:     ICD-10-CM ICD-9-CM   1. Pneumonia due to infectious organism, unspecified laterality, unspecified part of lung  J18.9 486   2. Sepsis, due to unspecified organism, unspecified whether acute organ dysfunction present ([facility])  A41.9 038.9     995.91   3. Acute respiratory failure with hypoxia ([facility])  J96.01 518.81   4. Oropharyngeal dysphagia  R13.12 787.22     Patient Active Problem List   Diagnosis   • Seizure disorder ([facility])   • COPD (chronic obstructive pulmonary disease) ([facility])   • Hypothyroidism   • Dementia ([facility])   • Difficulty with speech   • Hypertension   • History of CVA (cerebrovascular accident)   • Hypophosphatemia   • Pneumonia due to infectious organism, unspecified laterality, unspecified part of lung     Past Medical History:   Diagnosis Date   • Anemia    • COPD (chronic obstructive pulmonary disease) ([facility])    • Coronary artery disease    • Dementia ([facility])    • Disease of thyroid gland    • Hyperkalemia    • Hyperlipidemia    • Hypertension    • Seizures ([facility])      History reviewed. No pertinent surgical history.      Inpatient Speech Pathology Dysphagia Evaluation        PAIN SCALE: None indicated    PRECAUTIONS/CONTRAINDICATIONS: Standard, fall, contact    SUSPECTED ABUSE/NEGLECT/EXPLOITATION: None identified    SOCIAL/PSYCHOLOGICAL NEEDS/BARRIERS: None identified    PAST SOCIAL HISTORY: 81-year-old female, nursing home resident    PRIOR FUNCTION: History of dysphagia and aspiration, previously on honey thick liquid and puréed solid diet    PATIENT GOALS/EXPECTATIONS: Did not state    HISTORY: 81-year-old female referred for speech pathology dysphagia evaluation due to failing nursing dysphagia screening as well as concern of aspiration.  Patient was recently discharged from [facility] back to her skilled nursing facility.  Patient was treated during previous admission by speech therapy, completed modified barium swallow study as well as dysphagia evaluation and treatments.  At time of discharge, patient's diet with honey thickened liquids and purée solids.    CURRENT DIET LEVEL: N.p.o.    OBJECTIVE:    TEST ADMINISTERED: Clinical dysphagia evaluation    COGNITION/SAFETY AWARENESS: Impaired    BEHAVIORAL OBSERVATIONS: Awakens easily, alert, only follows intermittent commands despite cueing level    ORAL MOTOR EXAM: Without dentition, dry oral mucosa    VOICE QUALITY: N/A    REFLEX EXAM: Deferred    POSTURE: Total assistance with sitting fully upright    FEEDING/SWALLOWING FUNCTION: Assessed with honey thickened liquids and purée solids    CLINICAL OBSERVATIONS: Honey thick liquids by spoon with moderate lingual pumping.  Swallow completed within 6 to 7 seconds.  Laryngeal sounds clear to auscultation.  Honey thick liquids by cup with patient self administering.  Required assist due to impulsivity with Drinking of honey thick liquids.  With controlled drink of honey thick liquids, moderate lingual pumping with swallows completed within 6 to 7 seconds.  Laryngeal sounds clear to auscultation.  Purée solids with moderate lingual pumping.  Swallow completed.  Double swallow however laryngeal sounds clear to auscultation.  Laryngeal elevation noted to palpation.  Moderate swallow delay observed with consistencies given at bedside.  Silent aspiration cannot be ruled out.    DYSPHAGIA CRITERIA: History of dysphagia and aspiration.     FUNCTIONAL ASSESSMENT INSTRUMENT: Patient currently scored a level 4 of 7 on Functional Communication Measures for swallowing indicating a 40-59% limitation in function.    ASSESSMENT/ PLAN OF CARE:  Pt presents with limitations, noted below, that impede patient's ability to tolerate least restrictive diet safely and independently. The skills of a therapist  will be required to safely and effectively implement the following treatment plan to restore maximal level of function.    PROBLEMS:  1.  Risk of aspiration, swallow delay   LTG 1: 30 days.  Patient will increase functional communication measures for swallowing to level 5 of 7, indicating a 20-39% limitation in function.   STG 1a: 14 days.  Patient will tolerate least restrictive diet of honey thick liquids and purée solids with minimal to no signs or symptoms of aspiration.   STG 1b: 14 days.  Patient will tolerate trials of nectar thickened liquids with minimal to no signs or symptoms of aspiration.   STG 1c: 14 days.  Patient/staff education regarding positioning and compensatory strategies with p.o. intake.   TREATMENT: Speech therapy for dysphagia, education of strategies and tolerance of least restrictive diet.      FREQUENCY/DURATION: Daily, 5 days of weight    REHAB POTENTIAL:  Pt has good/fair rehab potential for stated goals.  The following limitations may influence improvement/ length of tx medical status, prior level of function.    RECOMMENDATIONS:   1.   DIET: Honey thickened liquids by spoon, puréed solids    2.  POSITION: Fully upright for all p.o. intake, 30 minutes following    3.  COMPENSATORY STRATEGIES: One-on-one assist for feeding, liquids by spoon, crushed meds in applesauce, slow rate    Pt/responsible party agrees with plan of care and has been informed of all alternatives, risks and benefits.  SLP Recommendation and Plan                          Anticipated Discharge Disposition (SLP): skilled nursing facility, anticipate therapy at next level of care (05/09/22 0944)                                                      EDUCATION  The patient has been educated in the following areas:   Dysphagia (Swallowing Impairment).              Time Calculation:    Time Calculation- SLP     Row Name 05/09/22 0945             Time Calculation- SLP    SLP Start Time 0630  -      SLP Stop Time 0730 -SN       SLP Time Calculation (min) 60 min  -SN      SLP Received On 05/09/22  -SN              Untimed Charges    89388-SF Eval Oral Pharyng Swallow Minutes 60  -SN              Total Minutes    Untimed Charges Total Minutes 60  -SN       Total Minutes 60  -SN            User Key  (r) = Recorded By, (t) = Taken By, (c) = Cosigned By    Initials Name Provider Type    Patricia Segundo SLP Speech and Language Pathologist                Therapy Charges for Today     Code Description Service Date Service Provider Modifiers Qty    [id number]  ST EVAL ORAL PHARYNG SWALLOW 4 5/9/2022 Patricia Herr SLP GN 1               LAILA Overton  5/9/2022

## 2022-05-09 NOTE — PLAN OF CARE
Problem: Fall Injury Risk  Goal: Absence of Fall and Fall-Related Injury  Outcome: Ongoing, Progressing  Intervention: Identify and Manage Contributors  Recent Flowsheet Documentation  Taken 5/9/2022 0000 by Domonique Gardiner RN  Medication Review/Management: medications reviewed  Taken 5/8/2022 2000 by Domonique Gardiner RN  Medication Review/Management: medications reviewed  Intervention: Promote Injury-Free Environment  Recent Flowsheet Documentation  Taken 5/9/2022 0000 by Domonique Gardiner RN  Safety Promotion/Fall Prevention: safety round/check completed  Taken 5/8/2022 2200 by Domonique Gardiner RN  Safety Promotion/Fall Prevention: safety round/check completed  Taken 5/8/2022 2000 by Domonique Gardiner RN  Safety Promotion/Fall Prevention: safety round/check completed     Problem: Adult Inpatient Plan of Care  Goal: Plan of Care Review  Outcome: Ongoing, Progressing  Flowsheets (Taken 5/9/2022 0535)  Progress: no change  Plan of Care Reviewed With:   patient   daughter  Outcome Evaluation: pt very confused and trying to climb out of bed, removing tele, vss, pt npo unitl speech evaluates, no acute events through the night  Goal: Patient-Specific Goal (Individualized)  Outcome: Ongoing, Progressing  Goal: Absence of Hospital-Acquired Illness or Injury  Outcome: Ongoing, Progressing  Intervention: Identify and Manage Fall Risk  Recent Flowsheet Documentation  Taken 5/9/2022 0000 by Domonique Gardiner RN  Safety Promotion/Fall Prevention: safety round/check completed  Taken 5/8/2022 2200 by Domonique Gardiner RN  Safety Promotion/Fall Prevention: safety round/check completed  Taken 5/8/2022 2000 by Domonique Gardiner RN  Safety Promotion/Fall Prevention: safety round/check completed  Intervention: Prevent Skin Injury  Recent Flowsheet Documentation  Taken 5/8/2022 2000 by Domonique Gardiner RN  Body Position: weight shifting  Intervention: Prevent and Manage VTE (Venous Thromboembolism) Risk  Recent Flowsheet  Documentation  Taken 5/8/2022 2000 by Domonique Gardiner RN  Range of Motion: ROM (range of motion) performed  Intervention: Prevent Infection  Recent Flowsheet Documentation  Taken 5/8/2022 2000 by Domonique Gardiner RN  Infection Prevention:   single patient room provided   rest/sleep promoted   personal protective equipment utilized   hand hygiene promoted   equipment surfaces disinfected  Goal: Optimal Comfort and Wellbeing  Outcome: Ongoing, Progressing  Intervention: Provide Person-Centered Care  Recent Flowsheet Documentation  Taken 5/8/2022 2000 by Domonique Gardiner RN  Trust Relationship/Rapport:   care explained   choices provided   emotional support provided   empathic listening provided   questions answered   questions encouraged   reassurance provided   thoughts/feelings acknowledged  Goal: Readiness for Transition of Care  Outcome: Ongoing, Progressing     Problem: Skin Injury Risk Increased  Goal: Skin Health and Integrity  Outcome: Ongoing, Progressing  Intervention: Optimize Skin Protection  Recent Flowsheet Documentation  Taken 5/8/2022 2000 by Domonique Gardiner RN  Pressure Reduction Techniques:   weight shift assistance provided   pressure points protected  Head of Bed (HOB) Positioning: HOB elevated     Problem: Seizure Disorder Comorbidity  Goal: Maintenance of Seizure Control  Outcome: Ongoing, Progressing   Goal Outcome Evaluation:  Plan of Care Reviewed With: patient, daughter        Progress: no change  Outcome Evaluation: pt very confused and trying to climb out of bed, removing tele, vss, pt npo unitl speech evaluates, no acute events through the night

## 2022-05-09 NOTE — PLAN OF CARE
Goal Outcome Evaluation:  Plan of Care Reviewed With: patient            ASSESSMENT/ PLAN OF CARE:  Pt presents with limitations, noted below, that impede patient's ability to tolerate least restrictive diet safely and independently. The skills of a therapist will be required to safely and effectively implement the following treatment plan to restore maximal level of function.    PROBLEMS:  1.  Risk of aspiration, swallow delay  TREATMENT: Speech therapy for dysphagia, education of strategies and tolerance of least restrictive diet.      FREQUENCY/DURATION: Daily, 5 days of weight    REHAB POTENTIAL:  Pt has good/fair rehab potential for stated goals.  The following limitations may influence improvement/ length of tx medical status, prior level of function.    RECOMMENDATIONS:   1.   DIET: Honey thickened liquids by spoon, puréed solids    2.  POSITION: Fully upright for all p.o. intake, 30 minutes following    3.  COMPENSATORY STRATEGIES: One-on-one assist for feeding, liquids by spoon, crushed meds in applesauce, slow rate

## 2022-05-09 NOTE — PROGRESS NOTES
[facility]   Hospitalist Progress Note  Date: 2022  Patient Name: Kezia Garcia  : 1940  MRN: [id number]  Date of admission: 2022      Subjective   Subjective     Chief Complaint: Hypoxia    Summary: 81 y.o. female vomiting from nursing home.  Patient was discharged from our hospital about 5 days ago and at that time was treated with a course of IV antibiotics for possible pneumonia.  Patient does have history of seizure disorder, COPD, hypothyroidism and significant dementia.  At the nursing home she was found to be in moderate respiratory distress.  She was also found to be hypoxic tachypneic with some crackles.  In the ER her work-up was unremarkable.  Her chest x-ray as well as CT of the chest does not show any infection.  Patient was admitted for respiratory symptoms        Interval Followup: Patient is awake and alert in the bed moving around and coloring some pictures.  Patient is very demented she is not alert or oriented to what is going on or any of her surroundings.  She is trying to pull the sheets off her bed.  She does not appear in any respiratory distress    Review of Systems   Unable to obtain given patient's dementia  Objective   Objective     Vitals:   Temp:  [97.2 °F (36.2 °C)-98.8 °F (37.1 °C)] 97.2 °F (36.2 °C)  Heart Rate:  [59-98] 62  Resp:  [15-20] 18  BP: (119-149)/(50-83) 137/62  Flow (L/min):  [1-2] 1  Physical Exam    Constitutional: Awake, alert, no acute distress moving around in the bed trying to pull her sheets off.   Eyes: Pupils equal, sclerae anicteric, no conjunctival injection   HENT: NCAT, mucous membranes moist   Neck: Supple, no thyromegaly, no lymphadenopathy, trachea midline   Respiratory: Decreased breath sounds.  Patient does have bilateral crackles and some rhonchi with no wheezing   cardiovascular: RRR, no murmurs,    Gastrointestinal: Positive bowel sounds, soft, nontender, nondistended   Musculoskeletal: No bilateral ankle edema, no clubbing or  cyanosis to extremities   Psychiatric: Appropriate affect, patient appears calm but confused   Neurologic: Oriented x 1, patient is moving all extremities.  Patient's speech is clear    Skin: No rashes     Result Review    Result Review:  I have personally reviewed the results from the time of this admission to 5/9/2022 08:20 EDT and agree with these findings:  [x]  Laboratory   CBC    CBC 5/4/22 5/8/22 5/8/22 5/9/22     0319 0911    WBC 6.42 9.60 7.93 6.43   RBC 3.16 (A) 3.30 (A) 3.18 (A) 3.50 (A)   Hemoglobin 10.2 (A) 10.6 (A) 10.2 (A) 11.0 (A)   Hematocrit 33.9 (A) 32.3 (A) 31.2 (A) 34.4   .3 (A) 97.9 (A) 98.1 (A) 98.3 (A)   MCH 32.3 32.1 32.1 31.4   MCHC 30.1 (A) 32.8 32.7 32.0   RDW 14.6 14.2 14.0 13.9   Platelets 225 307 271 260   (A) Abnormal value            BMP    BMP 5/4/22 5/8/22 5/8/22 5/9/22     0319 0911    BUN 21 19 17 11   Creatinine 0.71 0.91 0.79 0.67   Sodium 141 140 141 138   Potassium 4.0 3.4 (A) 3.6 3.9   Chloride 114 (A) 106 108 (A) 107   CO2 17.9 (A) 21.5 (A) 22.7 18.0 (A)   Calcium 8.5 (A) 9.1 8.6 8.8   (A) Abnormal value       Comments are available for some flowsheets but are not being displayed.             [x]  Microbiology       Blood Culture   Date Value Ref Range Status   05/08/2022 No growth at 24 hours  Preliminary   05/08/2022 No growth at 24 hours  Preliminary     No results found for: BCIDPCR, CXREFLEX, CSFCX, CULTURETIS  No results found for: CULTURES, HSVCX, URCX  No results found for: EYECULTURE, GCCX, HSVCULTURE, LABHSV  No results found for: LEGIONELLA, MRSACX, MUMPSCX, MYCOPLASCX  No results found for: NOCARDIACX, STOOLCX  No results found for: THROATCX, UNSTIMCULT, URINECX, CULTURE, VZVCULTUR  No results found for: VIRALCULTU, WOUNDCX      [x]  Radiology   XR Chest 1 View    Result Date: 5/8/2022  PROCEDURE: XR CHEST 1 VW  COMPARISON: [facility], CR, XR CHEST 1 VW, 4/30/2022, 14:45.  INDICATIONS: SHORTNESS OF BREATH.  FINDINGS: A single AP upright  portable chest radiograph was performed.  There are low lung volumes.  There may be mild subsegmental atelectasis in the lung bases.  Borderline cardiac enlargement is possible.  There is a partially visualized distal limb of a ventriculoperitoneal () shunt projected over the right neck, right chest, and upper right abdomen.  The thoracic aorta is atherosclerotic.  No pneumothorax is seen.  The previously seen implantable loop recorder (ILR), projected over the left lateral costophrenic sulcus, is not clearly identified on the current study.      Impression:  Probably no acute infiltrate is identified with pulmonary hypoinflation.      COMMENT:  Part of this note is an electronic transcription of spoken language to printed text. The electronic translation/transcription may permit erroneous, or at times, nonsensical (or even sensical) words or phrases to be inadvertently transcribed or omitted; this  has reviewed the note for such errors (as well as additional errors); however, some may still exist.  CATHY THAPA JR, MD       Electronically Signed and Approved By: CATHY THAPA JR, MD on 5/08/2022 at 2:57                []  EKG/Telemetry   []  Cardiology/Vascular   []  Pathology  []  Old records  []  Other:    Assessment/Plan   Assessment / Plan     Assessment/Plan:  Acute hypoxic respiratory failure  Patient does not appear to have pneumonia  History of seizure disorder  Question if patient has aspiration.  Patient is on a puréed diet with a recent speech evaluation 5 days ago  Advanced dementia  Hypothyroidism  Hypertension  History of CVA    PLAN   patient has had multiple hospitalizations for inability to handle secretions and recurrent pneumonia.  Suspect possible aspiration as a cause of her symptoms.  Patient does not appear to have a pneumonia per CT of the chest.  At this time will discontinue antibiotics  Keep patient on oxygen as needed  Patient did have a recent evaluation by speech and  they did recommend aspiration precautions however they did not see any overt aspiration  Resume patient on her seizure medication  Continue amlodipine for hypertension  Continue Synthroid for hypothyroidism  Continue duoneb   Covid negative.  Urinary antigens are negative.  Blood culture showing no growth  We will go ahead and consult palliative care again  Discussed plan with RN.               Continue modified diet per SLP recs  Keep head of bed elevated  Aspiration precaution       DVT prophylaxis: lovenox  Mechanical DVT prophylaxis orders are present.    CODE STATUS:   Code Status (Patient has no pulse and is not breathing): CPR (Attempt to Resuscitate)  Medical Interventions (Patient has pulse or is breathing): Full Support        Electronically signed by Magan Thompson DO, 05/09/22, 8:20 AM EDT.

## 2022-05-10 VITALS
DIASTOLIC BLOOD PRESSURE: 65 MMHG | TEMPERATURE: 98.2 F | OXYGEN SATURATION: 93 % | WEIGHT: 168.87 LBS | HEART RATE: 83 BPM | RESPIRATION RATE: 18 BRPM | HEIGHT: 66 IN | BODY MASS INDEX: 27.14 KG/M2 | SYSTOLIC BLOOD PRESSURE: 137 MMHG

## 2022-05-10 LAB
ANION GAP SERPL CALCULATED.3IONS-SCNC: 11.5 MMOL/L (ref 5–15)
BASOPHILS # BLD AUTO: 0.03 10*3/MM3 (ref 0–0.2)
BASOPHILS NFR BLD AUTO: 0.5 % (ref 0–1.5)
BUN SERPL-MCNC: 17 MG/DL (ref 8–23)
BUN/CREAT SERPL: 23 (ref 7–25)
CALCIUM SPEC-SCNC: 8.6 MG/DL (ref 8.6–10.5)
CHLORIDE SERPL-SCNC: 108 MMOL/L (ref 98–107)
CO2 SERPL-SCNC: 23.5 MMOL/L (ref 22–29)
CREAT SERPL-MCNC: 0.74 MG/DL (ref 0.57–1)
DEPRECATED RDW RBC AUTO: 50.1 FL (ref 37–54)
EGFRCR SERPLBLD CKD-EPI 2021: 82.4 ML/MIN/1.73
EOSINOPHIL # BLD AUTO: 0.14 10*3/MM3 (ref 0–0.4)
EOSINOPHIL NFR BLD AUTO: 2.3 % (ref 0.3–6.2)
ERYTHROCYTE [DISTWIDTH] IN BLOOD BY AUTOMATED COUNT: 13.9 % (ref 12.3–15.4)
GLUCOSE SERPL-MCNC: 93 MG/DL (ref 65–99)
HCT VFR BLD AUTO: 31.9 % (ref 34–46.6)
HGB BLD-MCNC: 10.2 G/DL (ref 12–15.9)
IMM GRANULOCYTES # BLD AUTO: 0.04 10*3/MM3 (ref 0–0.05)
IMM GRANULOCYTES NFR BLD AUTO: 0.7 % (ref 0–0.5)
INR PPP: 1.17 (ref 0.86–1.15)
LYMPHOCYTES # BLD AUTO: 2.26 10*3/MM3 (ref 0.7–3.1)
LYMPHOCYTES NFR BLD AUTO: 37.6 % (ref 19.6–45.3)
MAGNESIUM SERPL-MCNC: 2.1 MG/DL (ref 1.6–2.4)
MCH RBC QN AUTO: 31.5 PG (ref 26.6–33)
MCHC RBC AUTO-ENTMCNC: 32 G/DL (ref 31.5–35.7)
MCV RBC AUTO: 98.5 FL (ref 79–97)
MONOCYTES # BLD AUTO: 0.52 10*3/MM3 (ref 0.1–0.9)
MONOCYTES NFR BLD AUTO: 8.7 % (ref 5–12)
NEUTROPHILS NFR BLD AUTO: 3.02 10*3/MM3 (ref 1.7–7)
NEUTROPHILS NFR BLD AUTO: 50.2 % (ref 42.7–76)
NRBC BLD AUTO-RTO: 0 /100 WBC (ref 0–0.2)
PHOSPHATE SERPL-MCNC: 2.8 MG/DL (ref 2.5–4.5)
PLATELET # BLD AUTO: 288 10*3/MM3 (ref 140–450)
PMV BLD AUTO: 10.9 FL (ref 6–12)
POTASSIUM SERPL-SCNC: 3.4 MMOL/L (ref 3.5–5.2)
PROTHROMBIN TIME: 15.1 SECONDS (ref 11.8–14.9)
RBC # BLD AUTO: 3.24 10*6/MM3 (ref 3.77–5.28)
SODIUM SERPL-SCNC: 143 MMOL/L (ref 136–145)
WBC NRBC COR # BLD: 6.01 10*3/MM3 (ref 3.4–10.8)

## 2022-05-10 PROCEDURE — 80048 BASIC METABOLIC PNL TOTAL CA: CPT | Performed by: GENERAL PRACTICE

## 2022-05-10 PROCEDURE — 99239 HOSP IP/OBS DSCHRG MGMT >30: CPT | Performed by: FAMILY MEDICINE

## 2022-05-10 PROCEDURE — 94799 UNLISTED PULMONARY SVC/PX: CPT

## 2022-05-10 PROCEDURE — 92526 ORAL FUNCTION THERAPY: CPT

## 2022-05-10 PROCEDURE — 83735 ASSAY OF MAGNESIUM: CPT | Performed by: GENERAL PRACTICE

## 2022-05-10 PROCEDURE — 84100 ASSAY OF PHOSPHORUS: CPT | Performed by: GENERAL PRACTICE

## 2022-05-10 PROCEDURE — 94760 N-INVAS EAR/PLS OXIMETRY 1: CPT

## 2022-05-10 PROCEDURE — 85025 COMPLETE CBC W/AUTO DIFF WBC: CPT | Performed by: GENERAL PRACTICE

## 2022-05-10 PROCEDURE — 25010000002 ENOXAPARIN PER 10 MG: Performed by: INTERNAL MEDICINE

## 2022-05-10 PROCEDURE — 85610 PROTHROMBIN TIME: CPT | Performed by: GENERAL PRACTICE

## 2022-05-10 RX ORDER — POTASSIUM CHLORIDE 1.5 G/1.77G
20 POWDER, FOR SOLUTION ORAL 3 TIMES DAILY
Status: DISCONTINUED | OUTPATIENT
Start: 2022-05-10 | End: 2022-05-10 | Stop reason: HOSPADM

## 2022-05-10 RX ORDER — POTASSIUM CHLORIDE 600 MG/1
8 TABLET, FILM COATED, EXTENDED RELEASE ORAL DAILY
Qty: 30 TABLET | Refills: 0 | Status: SHIPPED | OUTPATIENT
Start: 2022-05-10 | End: 2022-06-09

## 2022-05-10 RX ADMIN — LEVOTHYROXINE SODIUM 300 MCG: 0.15 TABLET ORAL at 06:14

## 2022-05-10 RX ADMIN — IPRATROPIUM BROMIDE AND ALBUTEROL SULFATE 3 ML: 2.5; .5 SOLUTION RESPIRATORY (INHALATION) at 12:11

## 2022-05-10 RX ADMIN — ENOXAPARIN SODIUM 40 MG: 100 INJECTION SUBCUTANEOUS at 09:08

## 2022-05-10 RX ADMIN — MEMANTINE 10 MG: 10 TABLET ORAL at 09:08

## 2022-05-10 RX ADMIN — SENNOSIDES AND DOCUSATE SODIUM 2 TABLET: 50; 8.6 TABLET ORAL at 09:08

## 2022-05-10 RX ADMIN — POTASSIUM CHLORIDE 20 MEQ: 1.5 POWDER, FOR SOLUTION ORAL at 16:35

## 2022-05-10 RX ADMIN — LEVETIRACETAM 500 MG: 500 TABLET, FILM COATED ORAL at 09:08

## 2022-05-10 RX ADMIN — IPRATROPIUM BROMIDE AND ALBUTEROL SULFATE 3 ML: 2.5; .5 SOLUTION RESPIRATORY (INHALATION) at 18:23

## 2022-05-10 RX ADMIN — AMLODIPINE BESYLATE 2.5 MG: 2.5 TABLET ORAL at 09:08

## 2022-05-10 RX ADMIN — Medication 10 ML: at 09:09

## 2022-05-10 RX ADMIN — POTASSIUM CHLORIDE 20 MEQ: 1.5 POWDER, FOR SOLUTION ORAL at 11:35

## 2022-05-10 RX ADMIN — IPRATROPIUM BROMIDE AND ALBUTEROL SULFATE 3 ML: 2.5; .5 SOLUTION RESPIRATORY (INHALATION) at 06:24

## 2022-05-10 NOTE — PLAN OF CARE
Problem: Fall Injury Risk  Goal: Absence of Fall and Fall-Related Injury  Outcome: Ongoing, Progressing  Intervention: Identify and Manage Contributors  Recent Flowsheet Documentation  Taken 5/9/2022 2000 by Domonique Gardiner RN  Medication Review/Management: medications reviewed  Intervention: Promote Injury-Free Environment  Recent Flowsheet Documentation  Taken 5/10/2022 0400 by Domonique Gardiner RN  Safety Promotion/Fall Prevention: safety round/check completed  Taken 5/10/2022 0200 by Domonique Gardiner RN  Safety Promotion/Fall Prevention: safety round/check completed  Taken 5/10/2022 0000 by Domonique Gardiner RN  Safety Promotion/Fall Prevention: safety round/check completed  Taken 5/9/2022 2200 by Domonique Gardiner RN  Safety Promotion/Fall Prevention: safety round/check completed  Taken 5/9/2022 2000 by Domonique Gardiner RN  Safety Promotion/Fall Prevention: safety round/check completed     Problem: Adult Inpatient Plan of Care  Goal: Plan of Care Review  Outcome: Ongoing, Progressing  Flowsheets (Taken 5/10/2022 0631)  Progress: no change  Plan of Care Reviewed With:  • patient  • daughter  Outcome Evaluation: pt tolerating honey thick liquids, vss, pt still trying to get out of bed, no acute events through the night  Goal: Patient-Specific Goal (Individualized)  Outcome: Ongoing, Progressing  Goal: Absence of Hospital-Acquired Illness or Injury  Outcome: Ongoing, Progressing  Intervention: Identify and Manage Fall Risk  Recent Flowsheet Documentation  Taken 5/10/2022 0400 by Domonique Gardiner RN  Safety Promotion/Fall Prevention: safety round/check completed  Taken 5/10/2022 0200 by Domonique Gardiner RN  Safety Promotion/Fall Prevention: safety round/check completed  Taken 5/10/2022 0000 by Domonique Gardiner RN  Safety Promotion/Fall Prevention: safety round/check completed  Taken 5/9/2022 2200 by Domonique Gardiner RN  Safety Promotion/Fall Prevention: safety round/check completed  Taken 5/9/2022 2000 by  Hopper, Domonique, RN  Safety Promotion/Fall Prevention: safety round/check completed  Intervention: Prevent Skin Injury  Recent Flowsheet Documentation  Taken 5/9/2022 2000 by Domonique Gardiner RN  Body Position: position changed independently  Intervention: Prevent and Manage VTE (Venous Thromboembolism) Risk  Recent Flowsheet Documentation  Taken 5/9/2022 2000 by Domonique Gardiner RN  Activity Management: bedrest  Intervention: Prevent Infection  Recent Flowsheet Documentation  Taken 5/9/2022 2000 by Domonique Gardiner RN  Infection Prevention:  • single patient room provided  • rest/sleep promoted  • personal protective equipment utilized  • hand hygiene promoted  • equipment surfaces disinfected  Goal: Optimal Comfort and Wellbeing  Outcome: Ongoing, Progressing  Intervention: Provide Person-Centered Care  Recent Flowsheet Documentation  Taken 5/9/2022 2000 by Domonique Gardiner RN  Trust Relationship/Rapport:  • care explained  • choices provided  • emotional support provided  • empathic listening provided  • questions answered  • questions encouraged  • reassurance provided  • thoughts/feelings acknowledged  Goal: Readiness for Transition of Care  Outcome: Ongoing, Progressing     Problem: Skin Injury Risk Increased  Goal: Skin Health and Integrity  Outcome: Ongoing, Progressing  Intervention: Optimize Skin Protection  Recent Flowsheet Documentation  Taken 5/9/2022 2000 by Domonique Gardiner RN  Head of Bed (HOB) Positioning: HOB elevated     Problem: Seizure Disorder Comorbidity  Goal: Maintenance of Seizure Control  Outcome: Ongoing, Progressing     Problem: Palliative Care  Goal: Enhanced Quality of Life  Outcome: Ongoing, Progressing  Intervention: Optimize Psychosocial Wellbeing  Recent Flowsheet Documentation  Taken 5/9/2022 2000 by Domonique Gardiner RN  Family/Support System Care: support provided   Goal Outcome Evaluation:  Plan of Care Reviewed With: patient, daughter        Progress: no change  Outcome  Evaluation: pt tolerating honey thick liquids, vss, pt hadn't peed through the night and I did a bladder scan and she had 139ml, pt still trying to get out of bed, no acute events through the night

## 2022-05-10 NOTE — THERAPY TREATMENT NOTE
Acute Care - Speech Language Pathology   Swallow Treatment Note  Marc     Patient Name: Kezia Garcia  : 1940  MRN: [id number]  Today's Date: 5/10/2022               Admit Date: 2022    Visit Dx:     ICD-10-CM ICD-9-CM   1. Pneumonia due to infectious organism, unspecified laterality, unspecified part of lung  J18.9 486   2. Sepsis, due to unspecified organism, unspecified whether acute organ dysfunction present ([facility])  A41.9 038.9     995.91   3. Acute respiratory failure with hypoxia ([facility])  J96.01 518.81   4. Oropharyngeal dysphagia  R13.12 787.22     Patient Active Problem List   Diagnosis   • Seizure disorder ([facility])   • COPD (chronic obstructive pulmonary disease) ([facility])   • Hypothyroidism   • Dementia ([facility])   • Difficulty with speech   • Hypertension   • History of CVA (cerebrovascular accident)   • Hypophosphatemia   • Pneumonia due to infectious organism, unspecified laterality, unspecified part of lung     Past Medical History:   Diagnosis Date   • Anemia    • COPD (chronic obstructive pulmonary disease) ([facility])    • Coronary artery disease    • Dementia ([facility])    • Disease of thyroid gland    • Hyperkalemia    • Hyperlipidemia    • Hypertension    • Seizures ([facility])      History reviewed. No pertinent surgical history.  SPEECH PATHOLOGY DYSPHAGIA TREATMENT    Subjective/Behavioral Observations: Awake, smiling and laughing        Day/time of Treatment: 5/10/2022        Current Diet: Honey thick liquids, purée solids        Current Strategies: One-on-one assist/feed, liquids by spoon, 1/2 teaspoon bolus size        Treatment received: Treatment focused on tolerance of current diet recommendations        Results of treatment: Patient consumed approximately 60 mL of honey thickened liquids by spoon.  1/2 teaspoon bolus size utilized.  Moderate lingual pumping however swallows completed within 5 seconds.  Purée solids with moderate lingual pumping however swallows completed within 5 seconds.  Patient  consuming approximately 25% of meal.  Total assist for administration.  Laryngeal sounds remain clear to auscultation throughout treatment.        Progress toward goals: Progressing        Barriers to Achieving goals: Prior level of function        Plan of care:/changes in plan: Continue per plan                SLP Recommendation and Plan                                                                      Plan of Care Reviewed With: patient          EDUCATION  The patient has been educated in the following areas:   Dysphagia (Swallowing Impairment).              Time Calculation:    Time Calculation- SLP     Row Name 05/10/22 1044             Time Calculation- SLP    SLP Stop Time 0930  -SN      SLP Received On 05/10/22  -SN              Untimed Charges    30366-FB Treatment Swallow Minutes 45  -SN              Total Minutes    Untimed Charges Total Minutes 45  -SN       Total Minutes 45  -SN            User Key  (r) = Recorded By, (t) = Taken By, (c) = Cosigned By    Initials Name Provider Type    SN Patricia Herr SLP Speech and Language Pathologist                Therapy Charges for Today     Code Description Service Date Service Provider Modifiers Qty    [id number] HC ST EVAL ORAL PHARYNG SWALLOW 4 5/9/2022 Patricia Herr SLP GN 1    [id number] HC ST TREATMENT SWALLOW 3 5/10/2022 Patricia Herr SLP GN 1               LAILA Overton  5/10/2022

## 2022-05-10 NOTE — SIGNIFICANT NOTE
05/10/22 1200   SOCIAL WORK ASSESSMENT   Referral Source physician   Reason for Consult palliative care   Visit Type ongoing     Pt presented as awake with RN at bedside. No current issues. Pt appears to be managing well since readmission. Palliative care has been attempting to reach daughter to discuss goals of care and code status, possibly a need for [facility]. Left message requesting a return call. Pt is a long term resident and [facility] and can return when treatment is complete.   Palliative will continue attempts to follow-up with daughter.  Update:  Spoke with daughter about code status and goals. Daughter wants pt to be DNR/DNI. Daughter feels pt is moving towards end-of-life and hospice would be appropriate. Palliative also discussed comfort measures program at pt's nursing home. Daughter doesn't want to prolong pt's suffering in and out of hospital. Daughter is aligned with pt discharging back to nursing home, but will discuss care options with brother to prevent future admissions and provide pt with a better quality of life.   Provider notified.     Other: Daughter is currently sick with Covid and is not able to visit.   Palliative will continue to monitor and provide support.     [facility]

## 2022-05-10 NOTE — PLAN OF CARE
Goal Outcome Evaluation:  Plan of Care Reviewed With: daughter        Progress: no change  Outcome Evaluation: Pt remains the same pulling at things, attempting to get out of bed. Daughter updated via telephone earlier.

## 2022-05-10 NOTE — DISCHARGE SUMMARY
[facility]         HOSPITALIST  DISCHARGE SUMMARY    Patient Name: Kezia Garcia  : 1940  MRN: [id number]    Date of Admission: 2022  Date of Discharge: 5/10/2022  Primary Care Physician: Laurent Blanco MD    Consults     Date and Time Order Name Status Description    2022  3:48 AM Hospitalist (on-call MD unless specified)            Active and Resolved Hospital Problems:  Aspiration of the airway  Acute hypoxic respiratory failure  History of seizure disorder  Hypokalemia  Advanced dementia  Hypothyroidism  Hypertension  History of CVA  Active Hospital Problems    Diagnosis POA   • Pneumonia due to infectious organism, unspecified laterality, unspecified part of lung [J18.9] Yes      Resolved Hospital Problems   No resolved problems to display.       Hospital Course     Hospital Course:  Kezia Garcia is a 81 y.o. female presented with vomiting from nursing home.  Patient was discharged from our hospital about 5 days ago and at that time was treated with a course of IV antibiotics for possible pneumonia.  Patient does have history of seizure disorder, COPD, hypothyroidism and significant dementia.  At the nursing home she was found to be in moderate respiratory distress.  She was also found to be hypoxic tachypneic with some crackles.  In the ER her work-up was unremarkable.  Her chest x-ray as well as CT of the chest does not show any infection.  Patient was admitted for respiratory symptoms.  Patient initially started on empiric antibiotics.  Respiratory function improved and patient was weaned to room air.  Cultures negative to date.  Strep pneumo negative COVID-19 not detected.  Inflammatory markers remain within normal limits.  Antibiotics stopped.  Patient seen by speech-language pathology recommended honey thick liquids pureed solids and aspiration precautions.  Palliative care consulted. Daughter (reported POA) is currently sick with Covid and is not able to  visit or provide POA paperwork. Spoke with daughter by phone about code status and goals. Daughter wants pt to be DNR/DNI. Daughter feels pt is moving towards end-of-life and hospice would be appropriate. Palliative also discussed comfort measures program at pt's nursing home. Daughter doesn't want to prolong pt's suffering in and out of hospital. Daughter is aligned with pt discharging back to nursing home, but will discuss care options with brother to prevent future admissions and provide pt with a better quality of life.  Patient seen and evaluated on day discharge note still for discharge back to [facility] and rehab with aspiration precautions and dietary alteration as above to follow-up with primary care provider in 1 to 2 weeks to discuss goals of care with family at that time once patient is out of quarantine.        DISCHARGE Follow Up Recommendations for labs and diagnostics: As above      Day of Discharge     Vital Signs:  Temp:  [97.2 °F (36.2 °C)-98.6 °F (37 °C)] 98.6 °F (37 °C)  Heart Rate:  [60-79] 66  Resp:  [18-20] 20  BP: (120-143)/(46-65) 120/65  Physical Exam:               Constitutional: Awake, alert, no acute distress               Eyes: Pupils equal, sclerae anicteric, no conjunctival injection              HENT: NCAT, mucous membranes moist              Neck: Supple, no thyromegaly, no lymphadenopathy, trachea midline              Respiratory: Clear to auscultation bilaterally.  No wheezing rales or rhonchi              cardiovascular: RRR, no murmurs,               Gastrointestinal: Positive bowel sounds, soft, nontender, nondistended              Musculoskeletal: No bilateral ankle edema, no clubbing or cyanosis to extremities              Psychiatric: Appropriate affect, patient appears calm but confused              Neurologic: Oriented x 1, patient is moving all extremities.    Patient does not speak during interview despite several simple questions              Skin:  No rashes          Discharge Details        Discharge Medications      New Medications      Instructions Start Date   potassium chloride 8 MEQ CR tablet  Commonly known as: KLOR-CON   8 mEq, Oral, Daily         Continue These Medications      Instructions Start Date   acetaminophen 500 MG tablet  Commonly known as: TYLENOL   500 mg, Oral, Every 6 Hours PRN      albuterol (2.5 MG/3ML) 0.083% nebulizer solution  Commonly known as: PROVENTIL   2.5 mg, Nebulization, Every 4 Hours PRN      amLODIPine 5 MG tablet  Commonly known as: NORVASC   2.5 mg, Oral, Daily      donepezil 10 MG tablet  Commonly known as: ARICEPT   10 mg, Oral, Nightly      fenofibrate 160 MG tablet   160 mg, Oral, Daily      Flax Seed Oil 1000 MG capsule   1,000 mg, Oral, Daily      furosemide 20 MG tablet  Commonly known as: LASIX   20 mg, Oral, Daily      guaiFENesin 600 MG 12 hr tablet  Commonly known as: MUCINEX   1,200 mg, Oral, 2 Times Daily      ketoconazole 2 % cream  Commonly known as: NIZORAL   1 application, Topical, 2 Times Daily      latanoprost 0.005 % ophthalmic solution  Commonly known as: XALATAN   1 drop, Both Eyes, Nightly      levETIRAcetam 500 MG tablet  Commonly known as: KEPPRA   500 mg, Oral, 2 Times Daily      levothyroxine 300 MCG tablet  Commonly known as: SYNTHROID, LEVOTHROID   300 mcg, Oral, Daily      Lidocaine HCl 4 % cream  Commonly known as: LMX   1 application, Topical, 2 Times Daily, Apply to lower back      loperamide 2 MG capsule  Commonly known as: IMODIUM   2 mg, Oral, 4 Times Daily PRN      memantine 10 MG tablet  Commonly known as: NAMENDA   10 mg, Oral, 2 Times Daily             Allergies   Allergen Reactions   • Erythromycin Anaphylaxis   • Bacitracin Unknown - High Severity   • Cefazolin Unknown - High Severity   • Cephalosporins Unknown - High Severity   • Neomycin Unknown - High Severity   • Penicillins Unknown - High Severity   • Polymox [Amoxicillin] Unknown - High Severity   • Prednisone Unknown -  High Severity   • Quinolones Unknown - High Severity   • Statins Unknown - High Severity   • Sulfa Antibiotics Unknown - High Severity   • Trimethoprim Unknown - High Severity       Discharge Disposition:  Rehab Facility or Unit (DC - External)    Diet:  Hospital:  Diet Order   Procedures   • Diet Pureed; Honey Thick       Discharge Activity:       CODE STATUS:  Code Status and Medical Interventions:   Ordered at: 05/08/22 0643     Code Status (Patient has no pulse and is not breathing):    CPR (Attempt to Resuscitate)     Medical Interventions (Patient has pulse or is breathing):    Full Support         No future appointments.    Additional Instructions for the Follow-ups that You Need to Schedule     Discharge Follow-up with PCP   As directed       Currently Documented PCP:    Laurent Blanco MD    PCP Phone Number:    [phone]     Follow Up Details: Hospital discharge follow-up 1 to 2 weeks concern for aspiration, follow up with family regarding palliative care/hospice referral               Pertinent  and/or Most Recent Results     PROCEDURES:   None    LAB RESULTS:      Lab 05/10/22  0522 05/09/22  0624 05/08/22  0911 05/08/22  0320 05/08/22  0319 05/04/22  0555   WBC 6.01 6.43 7.93  --  9.60 6.42   HEMOGLOBIN 10.2* 11.0* 10.2*  --  10.6* 10.2*   HEMATOCRIT 31.9* 34.4 31.2*  --  32.3* 33.9*   PLATELETS 288 260 271  --  307 225   NEUTROS ABS 3.02 4.01 5.31  --  6.45  --    IMMATURE GRANS (ABS) 0.04 0.05 0.05  --  0.11*  --    LYMPHS ABS 2.26 1.70 2.04  --  2.27  --    MONOS ABS 0.52 0.49 0.45  --  0.56  --    EOS ABS 0.14 0.15 0.06  --  0.17  --    MCV 98.5* 98.3* 98.1*  --  97.9* 107.3*   PROCALCITONIN  --   --  0.05  --   --   --    LACTATE  --   --   --  2.0  --   --    PROTIME 15.1* 14.5 14.0  --   --   --    D DIMER QUANT  --   --  1.00*  --   --   --          Lab 05/10/22  0522 05/09/22  0624 05/08/22  0911 05/08/22  0319 05/04/22  0555   SODIUM 143 138 141 140 141   POTASSIUM 3.4* 3.9 3.6 3.4*  4.0   CHLORIDE 108* 107 108* 106 114*   CO2 23.5 18.0* 22.7 21.5* 17.9*   ANION GAP 11.5 13.0 10.3 12.5 9.1   BUN 17 11 17 19 21   CREATININE 0.74 0.67 0.79 0.91 0.71   EGFR 82.4 89.0 76.2 64.3 86.6   GLUCOSE 93 89 92 130* 98   CALCIUM 8.6 8.8 8.6 9.1 8.5*   MAGNESIUM 2.1 2.0 2.0  --   --    PHOSPHORUS 2.8 2.7 3.2  --   --    HEMOGLOBIN A1C  --  5.60  --   --   --          Lab 05/09/22  0624 05/08/22  0319   TOTAL PROTEIN 7.0 7.2   ALBUMIN 3.30* 3.50   GLOBULIN 3.7 3.7   ALT (SGPT) 13 13   AST (SGOT) 22 15   BILIRUBIN 0.3 0.3   ALK PHOS 46 45         Lab 05/10/22  0522 05/09/22  0624 05/08/22  0911 05/08/22 0319   PROBNP  --   --   --  816.1   TROPONIN T  --   --   --  <0.010   PROTIME 15.1* 14.5 14.0  --    INR 1.17* 1.12 1.07  --          Lab 05/09/22  0624   CHOLESTEROL 135   LDL CHOL 76   HDL CHOL 38*   TRIGLYCERIDES 116             Brief Urine Lab Results  (Last result in the past 365 days)      Color   Clarity   Blood   Leuk Est   Nitrite   Protein   CREAT   Urine HCG        04/30/22 2104 Yellow   Clear   Negative   Trace   Negative   30 mg/dL (1+)               Microbiology Results (last 10 days)     Procedure Component Value - Date/Time    S. Pneumo Ag Urine or CSF - Urine, Urine, Clean Catch [[id number]]  (Normal) Collected: 05/08/22 1233    Lab Status: Final result Specimen: Urine, Clean Catch Updated: 05/08/22 1324     Strep Pneumo Ag Negative    COVID-19,APTIMA PANTHER(USHA),[facility]/ [facility]KA, NP/OP SWAB IN UTM/VTM/SALINE TRANSPORT MEDIA,24 HR TAT - Swab, Nasopharynx [[id number]]  (Normal) Collected: 05/08/22 0915    Lab Status: Final result Specimen: Swab from Nasopharynx Updated: 05/08/22 1635     COVID19 Not Detected    Narrative:      Fact sheet for providers: https://www.fda.gov/media/124105/download     Fact sheet for patients: https://www.fda.gov/media/934700/download    Test performed by RT PCR.    Mycoplasma Pneumoniae Antibody, IgM - Blood, Arm, Left [[id number]]  (Normal) Collected: 05/08/22 0911     Lab Status: Final result Specimen: Blood from Arm, Left Updated: 05/08/22 1045     Mycoplasma pneumo IgM Negative    Blood Culture - Blood, Arm, Left [[id number]]  (Normal) Collected: 05/08/22 0911    Lab Status: Preliminary result Specimen: Blood from Arm, Left Updated: 05/10/22 0931     Blood Culture No growth at 2 days    Blood Culture - Blood, Blood, Central Line [[id number]]  (Normal) Collected: 05/08/22 0911    Lab Status: Preliminary result Specimen: Blood, Central Line Updated: 05/10/22 0931     Blood Culture No growth at 2 days    Blood Culture - Blood, Arm, Left [[id number]]  (Normal) Collected: 05/08/22 0320    Lab Status: Preliminary result Specimen: Blood from Arm, Left Updated: 05/10/22 0330     Blood Culture No growth at 2 days    MRSA Screen, PCR (Inpatient) - Swab, Nares [[id number]]  (Abnormal) Collected: 05/02/22 1252    Lab Status: Final result Specimen: Swab from Nares Updated: 05/02/22 1625     MRSA PCR MRSA Detected    Urine Culture - Urine, Urine, Catheter [[id number]]  (Normal) Collected: 04/30/22 2104    Lab Status: Final result Specimen: Urine, Catheter Updated: 05/01/22 2006     Urine Culture No growth          CT Chest With Contrast Diagnostic    Result Date: 5/8/2022  Impression:  No pulmonary embolism is seen.     COMMENT:  Part of this note is an electronic transcription of spoken language to printed text. The electronic translation/transcription may permit erroneous, or at times, nonsensical (or even sensical) words or phrases to be inadvertently transcribed or omitted; this  has reviewed the note for such errors (as well as additional errors); however, some may still exist.  CATHY THAPA JR, MD       Electronically Signed and Approved By: CATHY THAPA JR, MD on 5/08/2022 at 20:27              FL Video Swallow With Speech Single Contrast    Result Date: 5/2/2022  Impression:   1. No evidence of penetration or aspiration. 2. Please refer to speech pathology report for  further details.     NEGAR JARAMILLO MD       Electronically Signed and Approved By: ENGAR JARAMILLO MD on 5/02/2022 at 14:01             XR Chest 1 View    Result Date: 5/8/2022  Impression:  Probably no acute infiltrate is identified with pulmonary hypoinflation.      COMMENT:  Part of this note is an electronic transcription of spoken language to printed text. The electronic translation/transcription may permit erroneous, or at times, nonsensical (or even sensical) words or phrases to be inadvertently transcribed or omitted; this  has reviewed the note for such errors (as well as additional errors); however, some may still exist.  CATHY THAPA JR, MD       Electronically Signed and Approved By: CATHY THAPA JR, MD on 5/08/2022 at 2:57              XR Chest 1 View    Result Date: 4/30/2022  Impression:  No active cardiopulmonary disease is seen.       TEE TRONCOSO MD       Electronically Signed and Approved By: TEE TRONCOSO MD on 4/30/2022 at 15:02                           Labs Pending at Discharge:  Pending Labs     Order Current Status    Legionella Pneumophila 1 - 6,IgM In process    Blood Culture - Blood, Arm, Left Preliminary result    Blood Culture - Blood, Arm, Left Preliminary result    Blood Culture - Blood, Blood, Central Line Preliminary result            Time spent on Discharge including face to face service: Greater than 35 minutes    Electronically signed by Ronni Maurer MD, 05/10/22, 3:19 PM EDT.

## 2022-05-13 LAB
BACTERIA SPEC AEROBE CULT: NORMAL

## 2022-05-16 LAB — SPECIMEN STATUS: NORMAL

## 2022-05-17 LAB — L PNEUMO AG LOWER RESP QL IF: NEGATIVE

## 2022-05-19 ENCOUNTER — APPOINTMENT (OUTPATIENT)
Dept: CT IMAGING | Facility: HOSPITAL | Age: 82
End: 2022-05-19

## 2022-05-19 ENCOUNTER — HOSPITAL ENCOUNTER (EMERGENCY)
Facility: HOSPITAL | Age: 82
Discharge: NURSING, REHAB OR LONG-TERM CARE | End: 2022-05-20
Attending: EMERGENCY MEDICINE | Admitting: EMERGENCY MEDICINE

## 2022-05-19 ENCOUNTER — APPOINTMENT (OUTPATIENT)
Dept: GENERAL RADIOLOGY | Facility: HOSPITAL | Age: 82
End: 2022-05-19

## 2022-05-19 VITALS
HEART RATE: 69 BPM | HEIGHT: 65 IN | TEMPERATURE: 97.6 F | RESPIRATION RATE: 16 BRPM | DIASTOLIC BLOOD PRESSURE: 46 MMHG | BODY MASS INDEX: 25.42 KG/M2 | WEIGHT: 152.56 LBS | SYSTOLIC BLOOD PRESSURE: 134 MMHG | OXYGEN SATURATION: 96 %

## 2022-05-19 DIAGNOSIS — F03.90 DEMENTIA WITHOUT BEHAVIORAL DISTURBANCE, UNSPECIFIED DEMENTIA TYPE: Primary | ICD-10-CM

## 2022-05-19 DIAGNOSIS — R55 SYNCOPE, UNSPECIFIED SYNCOPE TYPE: ICD-10-CM

## 2022-05-19 DIAGNOSIS — Z86.69 HISTORY OF SEIZURE DISORDER: ICD-10-CM

## 2022-05-19 LAB
ALBUMIN SERPL-MCNC: 4 G/DL (ref 3.5–5.2)
ALBUMIN/GLOB SERPL: 1 G/DL
ALP SERPL-CCNC: 53 U/L (ref 39–117)
ALT SERPL W P-5'-P-CCNC: 15 U/L (ref 1–33)
ANION GAP SERPL CALCULATED.3IONS-SCNC: 11.6 MMOL/L (ref 5–15)
AST SERPL-CCNC: 17 U/L (ref 1–32)
BASOPHILS # BLD AUTO: 0.04 10*3/MM3 (ref 0–0.2)
BASOPHILS NFR BLD AUTO: 0.3 % (ref 0–1.5)
BILIRUB SERPL-MCNC: 0.3 MG/DL (ref 0–1.2)
BUN SERPL-MCNC: 24 MG/DL (ref 8–23)
BUN/CREAT SERPL: 17 (ref 7–25)
CALCIUM SPEC-SCNC: 9.2 MG/DL (ref 8.6–10.5)
CHLORIDE SERPL-SCNC: 104 MMOL/L (ref 98–107)
CO2 SERPL-SCNC: 24.4 MMOL/L (ref 22–29)
CREAT SERPL-MCNC: 1.41 MG/DL (ref 0.57–1)
DEPRECATED RDW RBC AUTO: 49.5 FL (ref 37–54)
EGFRCR SERPLBLD CKD-EPI 2021: 38 ML/MIN/1.73
EOSINOPHIL # BLD AUTO: 0.13 10*3/MM3 (ref 0–0.4)
EOSINOPHIL NFR BLD AUTO: 1.1 % (ref 0.3–6.2)
ERYTHROCYTE [DISTWIDTH] IN BLOOD BY AUTOMATED COUNT: 13.5 % (ref 12.3–15.4)
GLOBULIN UR ELPH-MCNC: 3.9 GM/DL
GLUCOSE SERPL-MCNC: 143 MG/DL (ref 65–99)
HCT VFR BLD AUTO: 35.7 % (ref 34–46.6)
HGB BLD-MCNC: 11.4 G/DL (ref 12–15.9)
HOLD SPECIMEN: NORMAL
HOLD SPECIMEN: NORMAL
IMM GRANULOCYTES # BLD AUTO: 0.05 10*3/MM3 (ref 0–0.05)
IMM GRANULOCYTES NFR BLD AUTO: 0.4 % (ref 0–0.5)
LYMPHOCYTES # BLD AUTO: 1.22 10*3/MM3 (ref 0.7–3.1)
LYMPHOCYTES NFR BLD AUTO: 10.2 % (ref 19.6–45.3)
MAGNESIUM SERPL-MCNC: 2.1 MG/DL (ref 1.6–2.4)
MCH RBC QN AUTO: 31.6 PG (ref 26.6–33)
MCHC RBC AUTO-ENTMCNC: 31.9 G/DL (ref 31.5–35.7)
MCV RBC AUTO: 98.9 FL (ref 79–97)
MONOCYTES # BLD AUTO: 0.68 10*3/MM3 (ref 0.1–0.9)
MONOCYTES NFR BLD AUTO: 5.7 % (ref 5–12)
NEUTROPHILS NFR BLD AUTO: 82.3 % (ref 42.7–76)
NEUTROPHILS NFR BLD AUTO: 9.84 10*3/MM3 (ref 1.7–7)
NRBC BLD AUTO-RTO: 0 /100 WBC (ref 0–0.2)
PLATELET # BLD AUTO: 340 10*3/MM3 (ref 140–450)
PMV BLD AUTO: 11.3 FL (ref 6–12)
POTASSIUM SERPL-SCNC: 4 MMOL/L (ref 3.5–5.2)
PROT SERPL-MCNC: 7.9 G/DL (ref 6–8.5)
QT INTERVAL: 386 MS
RBC # BLD AUTO: 3.61 10*6/MM3 (ref 3.77–5.28)
SODIUM SERPL-SCNC: 140 MMOL/L (ref 136–145)
T4 FREE SERPL-MCNC: 1.81 NG/DL (ref 0.93–1.7)
TROPONIN T SERPL-MCNC: <0.01 NG/ML (ref 0–0.03)
TSH SERPL DL<=0.05 MIU/L-ACNC: 1.63 UIU/ML (ref 0.27–4.2)
WBC NRBC COR # BLD: 11.96 10*3/MM3 (ref 3.4–10.8)
WHOLE BLOOD HOLD COAG: NORMAL
WHOLE BLOOD HOLD SPECIMEN: NORMAL

## 2022-05-19 PROCEDURE — 84443 ASSAY THYROID STIM HORMONE: CPT | Performed by: EMERGENCY MEDICINE

## 2022-05-19 PROCEDURE — 71045 X-RAY EXAM CHEST 1 VIEW: CPT

## 2022-05-19 PROCEDURE — 36415 COLL VENOUS BLD VENIPUNCTURE: CPT

## 2022-05-19 PROCEDURE — 84439 ASSAY OF FREE THYROXINE: CPT | Performed by: EMERGENCY MEDICINE

## 2022-05-19 PROCEDURE — 70450 CT HEAD/BRAIN W/O DYE: CPT

## 2022-05-19 PROCEDURE — 80053 COMPREHEN METABOLIC PANEL: CPT

## 2022-05-19 PROCEDURE — 84484 ASSAY OF TROPONIN QUANT: CPT | Performed by: EMERGENCY MEDICINE

## 2022-05-19 PROCEDURE — 83735 ASSAY OF MAGNESIUM: CPT | Performed by: EMERGENCY MEDICINE

## 2022-05-19 PROCEDURE — 93005 ELECTROCARDIOGRAM TRACING: CPT | Performed by: EMERGENCY MEDICINE

## 2022-05-19 PROCEDURE — 80177 DRUG SCRN QUAN LEVETIRACETAM: CPT | Performed by: EMERGENCY MEDICINE

## 2022-05-19 PROCEDURE — 85025 COMPLETE CBC W/AUTO DIFF WBC: CPT

## 2022-05-19 PROCEDURE — 99284 EMERGENCY DEPT VISIT MOD MDM: CPT

## 2022-05-19 RX ORDER — SODIUM CHLORIDE 0.9 % (FLUSH) 0.9 %
10 SYRINGE (ML) INJECTION AS NEEDED
Status: DISCONTINUED | OUTPATIENT
Start: 2022-05-19 | End: 2022-05-20 | Stop reason: HOSPADM

## 2022-05-19 NOTE — ED PROVIDER NOTES
Time: 6:28 PM EDT  Arrived by: ambulance  Chief Complaint: Unresponsive  History provided by: EMS  History is limited by: N/A     History of Present Illness:  Patient is a 81 y.o. year old female that presents to the emergency department with responsive.  According to EMS, the nursing staff found the patient difficult to arouse and unresponsive in the bed at the nursing home.  The patient has severe dementia and a history of seizures.  EMS states that by the time the arrived at the nursing home the patient was back to her normal baseline mental status which is severe dementia.  The patient is typically nonverbal and does not follow commands.  The EMS ports that the nursing staff noted no cyanosis and the patient returned to her baseline mental status over a period time.  She has done this previously with seizures.  No other history is obtainable.      Similar Symptoms Previously: Yes  Recently seen: Unknown      Patient Care Team  Primary Care Provider: Laurent Blanco MD    Past Medical History:     Allergies   Allergen Reactions   • Erythromycin Anaphylaxis   • Bacitracin Unknown - High Severity   • Cefazolin Unknown - High Severity   • Cephalosporins Unknown - High Severity   • Neomycin Unknown - High Severity   • Penicillins Unknown - High Severity   • Polymox [Amoxicillin] Unknown - High Severity   • Prednisone Unknown - High Severity   • Quinolones Unknown - High Severity   • Statins Unknown - High Severity   • Sulfa Antibiotics Unknown - High Severity   • Trimethoprim Unknown - High Severity     Past Medical History:   Diagnosis Date   • Anemia    • COPD (chronic obstructive pulmonary disease) ([facility])    • Coronary artery disease    • Dementia ([facility])    • Disease of thyroid gland    • Hyperkalemia    • Hyperlipidemia    • Hypertension    • Seizures ([facility])      History reviewed. No pertinent surgical history.  History reviewed. No pertinent family history.    Home Medications:  Prior to Admission medications     Medication Sig Start Date End Date Taking? Authorizing Provider   acetaminophen (TYLENOL) 500 MG tablet Take 500 mg by mouth Every 6 (Six) Hours As Needed for Mild Pain  or Fever.    Branden Marion MD   albuterol (PROVENTIL) (2.5 MG/3ML) 0.083% nebulizer solution Take 2.5 mg by nebulization Every 4 (Four) Hours As Needed for Wheezing or Shortness of Air.    Branden Marion MD   amLODIPine (NORVASC) 5 MG tablet Take 0.5 tablets by mouth Daily. 5/4/22   Tariq Hernandes DO   donepezil (ARICEPT) 10 MG tablet Take 10 mg by mouth Every Night.    Branden Marion MD   fenofibrate 160 MG tablet Take 160 mg by mouth Daily.    Branden Marion MD   Flaxseed, Linseed, (Flax Seed Oil) 1000 MG capsule Take 1,000 mg by mouth Daily.    Branden Marion MD   furosemide (LASIX) 20 MG tablet Take 20 mg by mouth Daily.    Branden Marion MD   guaiFENesin (MUCINEX) 600 MG 12 hr tablet Take 2 tablets by mouth 2 (Two) Times a Day. 5/4/22   Tariq Hernandes DO   ketoconazole (NIZORAL) 2 % cream Apply 1 application topically to the appropriate area as directed 2 (Two) Times a Day.    Branden Marion MD   latanoprost (XALATAN) 0.005 % ophthalmic solution Administer 1 drop to both eyes Every Night.    Branden Marion MD   levETIRAcetam (KEPPRA) 500 MG tablet Take 500 mg by mouth 2 (Two) Times a Day.    Branden Marion MD   levothyroxine (SYNTHROID, LEVOTHROID) 300 MCG tablet Take 300 mcg by mouth Daily.    Branden Marion MD   Lidocaine HCl (LMX) 4 % cream Apply 1 application topically to the appropriate area as directed 2 (Two) Times a Day. Apply to lower back    Branden Marion MD   loperamide (IMODIUM) 2 MG capsule Take 2 mg by mouth 4 (Four) Times a Day As Needed for Diarrhea.    Branden Marion MD   memantine (NAMENDA) 10 MG tablet Take 10 mg by mouth 2 (Two) Times a Day.    Branden Marion MD   potassium chloride (KLOR-CON) 8 MEQ CR tablet Take 1 tablet by mouth  "Daily for 30 days. 5/10/22 6/9/22  Ronni Maurer MD        Social History:   Social History     Tobacco Use   • Smoking status: Never Smoker   Vaping Use   • Vaping Use: Never used   Substance Use Topics   • Alcohol use: Not Currently   • Drug use: Never          Record Review:  I have reviewed the patient's records in [facility].     Review of Systems:  Review of Systems   Unable to perform ROS: Dementia   Psychiatric/Behavioral: Negative.            Physical Exam:  /46   Pulse 69   Temp 97.6 °F (36.4 °C) (Oral)   Resp 16   Ht 165.1 cm (65\")   Wt 69.2 kg (152 lb 8.9 oz)   SpO2 96%   BMI 25.39 kg/m²     Physical Exam  Vitals and nursing note reviewed.   Constitutional:       General: She is not in acute distress.     Appearance: Normal appearance. She is not ill-appearing, toxic-appearing or diaphoretic.   HENT:      Head: Normocephalic and atraumatic.      Mouth/Throat:      Mouth: Mucous membranes are dry.   Eyes:      Pupils: Pupils are equal, round, and reactive to light.   Cardiovascular:      Rate and Rhythm: Normal rate and regular rhythm.      Pulses: Normal pulses.           Carotid pulses are 2+ on the right side and 2+ on the left side.       Radial pulses are 2+ on the right side and 2+ on the left side.        Femoral pulses are 2+ on the right side and 2+ on the left side.       Popliteal pulses are 2+ on the right side and 2+ on the left side.        Dorsalis pedis pulses are 2+ on the right side and 2+ on the left side.        Posterior tibial pulses are 2+ on the right side and 2+ on the left side.      Heart sounds: Normal heart sounds. No murmur heard.  Pulmonary:      Effort: Pulmonary effort is normal. No accessory muscle usage, respiratory distress or retractions.      Breath sounds: Normal breath sounds. No wheezing, rhonchi or rales.   Abdominal:      General: Abdomen is flat. There is no distension.      Palpations: Abdomen is soft. There is no mass.      Tenderness: There is no " abdominal tenderness. There is no right CVA tenderness, left CVA tenderness, guarding or rebound.      Comments: No rigidity   Musculoskeletal:         General: No swelling, tenderness or deformity.      Cervical back: Neck supple. No tenderness.      Right lower leg: No edema.      Left lower leg: No edema.   Skin:     General: Skin is warm and dry.      Capillary Refill: Capillary refill takes less than 2 seconds.      Coloration: Skin is not jaundiced or pale.      Findings: No erythema.   Neurological:      General: No focal deficit present.      Mental Status: She is alert. Mental status is at baseline. She is disoriented.      Sensory: No sensory deficit.      Motor: No weakness.      Comments: A complete neurological exam could not be performed due to apprehension and dementia, grossly the patient appears to be neurologically intact as she is moving all 4 extremities and appears to have sensation in all 4 extremities      The nursing staff has verified with the nursing home that the patient is at her baseline mental status with a chronic history of dementia   Psychiatric:         Attention and Perception: Attention normal.         Cognition and Memory: Cognition is impaired.                Medications in the Emergency Department:  Medications   sodium chloride 0.9 % flush 10 mL (has no administration in time range)        Labs  Lab Results (last 24 hours)     Procedure Component Value Units Date/Time    CBC & Differential [[id number]]  (Abnormal) Collected: 05/19/22 1521    Specimen: Blood Updated: 05/19/22 1531    Narrative:      The following orders were created for panel order CBC & Differential.  Procedure                               Abnormality         Status                     ---------                               -----------         ------                     CBC Auto Differential[[id number]]        Abnormal            Final result                 Please view results for these tests on the  individual orders.    Comprehensive Metabolic Panel [[id number]]  (Abnormal) Collected: 05/19/22 1521    Specimen: Blood Updated: 05/19/22 1549     Glucose 143 mg/dL      BUN 24 mg/dL      Creatinine 1.41 mg/dL      Sodium 140 mmol/L      Potassium 4.0 mmol/L      Chloride 104 mmol/L      CO2 24.4 mmol/L      Calcium 9.2 mg/dL      Total Protein 7.9 g/dL      Albumin 4.00 g/dL      ALT (SGPT) 15 U/L      AST (SGOT) 17 U/L      Alkaline Phosphatase 53 U/L      Total Bilirubin 0.3 mg/dL      Globulin 3.9 gm/dL      A/G Ratio 1.0 g/dL      BUN/Creatinine Ratio 17.0     Anion Gap 11.6 mmol/L      eGFR 38.0 mL/min/1.73      Comment: National Kidney Foundation and American Society of Nephrology (ASN) Task Force recommended calculation based on the Chronic Kidney Disease Epidemiology Collaboration (CKD-EPI) equation refit without adjustment for race.       Narrative:      GFR Normal >60  Chronic Kidney Disease <60  Kidney Failure <15      CBC Auto Differential [[id number]]  (Abnormal) Collected: 05/19/22 1521    Specimen: Blood Updated: 05/19/22 1531     WBC 11.96 10*3/mm3      RBC 3.61 10*6/mm3      Hemoglobin 11.4 g/dL      Hematocrit 35.7 %      MCV 98.9 fL      MCH 31.6 pg      MCHC 31.9 g/dL      RDW 13.5 %      RDW-SD 49.5 fl      MPV 11.3 fL      Platelets 340 10*3/mm3      Neutrophil % 82.3 %      Lymphocyte % 10.2 %      Monocyte % 5.7 %      Eosinophil % 1.1 %      Basophil % 0.3 %      Immature Grans % 0.4 %      Neutrophils, Absolute 9.84 10*3/mm3      Lymphocytes, Absolute 1.22 10*3/mm3      Monocytes, Absolute 0.68 10*3/mm3      Eosinophils, Absolute 0.13 10*3/mm3      Basophils, Absolute 0.04 10*3/mm3      Immature Grans, Absolute 0.05 10*3/mm3      nRBC 0.0 /100 WBC     Magnesium [[id number]]  (Normal) Collected: 05/19/22 1521    Specimen: Blood Updated: 05/19/22 1900     Magnesium 2.1 mg/dL     T4, Free [[id number]]  (Abnormal) Collected: 05/19/22 1521    Specimen: Blood Updated: 05/19/22 1911     Free T4 1.81  ng/dL     Narrative:      Results may be falsely increased if patient taking Biotin.      TSH [[id number]]  (Normal) Collected: 05/19/22 1521    Specimen: Blood Updated: 05/19/22 1911     TSH 1.630 uIU/mL     Troponin [[id number]]  (Normal) Collected: 05/19/22 1521    Specimen: Blood Updated: 05/19/22 1911     Troponin T <0.010 ng/mL     Narrative:      Troponin T Reference Range:  <= 0.03 ng/mL-   Negative for AMI  >0.03 ng/mL-     Abnormal for myocardial necrosis.  Clinicians would have to utilize clinical acumen, EKG, Troponin and serial changes to determine if it is an Acute Myocardial Infarction or myocardial injury due to an underlying chronic condition.       Results may be falsely decreased if patient taking Biotin.      Levetiracetam Level (Keppra) [[id number]] Collected: 05/19/22 1849    Specimen: Blood Updated: 05/19/22 1851           Imaging:  CT Head Without Contrast   Final Result       1. No acute intracranial abnormality.   2. Stable chronic bilateral subdural hematomas/hygromas.  No significant mass effect or midline    shift.   3. Chronic infarcts/encephalomalacia throughout the majority of the right temporal lobe and small    portions of both frontal lobes and the left insula.   4. Moderate chronic small vessel ischemic change.   5. Air-fluid level in the left maxillary sinus.  Correlate for acute sinusitis.   6. Ventriculostomy catheter appears unchanged terminating in the left frontal white matter.   7. 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                VARGAS LABOY MD          Electronically Signed and Approved By: VARGAS LABOY MD on 5/19/2022 at 21:44                           XR Chest 1 View   Final Result       1. Mild cardiomegaly.   2. Low lung volumes without definite evidence of acute pulmonary infiltrate.                        ABRAHAM MCKINNON MD          Electronically Signed and Approved By: ABRAHAM MCKINNON MD on 5/19/2022 at 16:16                                Procedures:  Procedures    Progress  ED Course as of 05/19/22 2336   u May 19, 2022   1856 EKG:    Rhythm: Sinus rhythm  Rate: 74  Intervals: Normal IN QT interval  T-wave: Diffuse T wave flattening  ST Segment: No obvious pathological ST ovation ST depression    The corrected QT interval is 421  QRS duration is 88  QRS axis is 7    EKG Comparison: The QRS and ST morphology appear to be unchanged from the EKG performed March 8, 2022    Interpreted by me   [SD]      ED Course User Index  [SD] Prasanna Howell DO                            Medical Decision Making:  MDM  Number of Diagnoses or Management Options  Dementia without behavioral disturbance, unspecified dementia type (HCC)  History of seizure disorder  Syncope, unspecified syncope type  Diagnosis management comments:       According to EMS, the patient was at her baseline mental status by the time he arrived there.  The patient had 1 initial blood pressure that was low.  However, the patient's pressures after that were completely normal.  The patient's vital signs were stable and the entire time in the emergency room.  Patient was in the emergency room for over 8-1/2 hours and had no recurrent events.  The patient's chemistry demonstrated only mild renal insufficiency.  The patient's electrolytes were all normal.  The patient was afebrile.  The patient had a slightly elevated white blood cell count at 11.9.  The patient's hemoglobin is 11.4.  Otherwise, the patient's CBC appeared normal.  The patient had a normal magnesium.  The patient's TSH was normal.  The patient had a normal troponin.  The patient's EKG demonstrated normal sinus rhythm with no obvious pathological ST changes.  The patient had a normal QT interval.  The patient's free T4 was slightly elevated at 1.1.  The patient will hold her Synthroid discussed with her primary care physician.  The patient's CT scan demonstrated large amount of chronic changes but the radiologist felt  there is no acute changes and no acute abnormalities.  Again, the patient returned to her baseline and had no recurrent episodes emergency room..  Is very probable that the patient had a seizure when was found in a postictal state.  I did check a Keppra level and this is pending at the time of discharge.  The patient is a DNR.  The patient is nonverbal.  The patient will be returned back to the nursing home and followed up by the primary care physician tomorrow.  We have asked that the patient return to the emergency room should there be any further syncopal episodes, unresponsive episodes, seizures, vomiting, mental status changes from her baseline or any new symptoms in the nursing home was concerned with.  Patient appears appropriate for discharge and outpatient follow-up.         Amount and/or Complexity of Data Reviewed  Clinical lab tests: reviewed  Tests in the radiology section of CPT®: reviewed  Tests in the medicine section of CPT®: reviewed               Final diagnoses:   Dementia without behavioral disturbance, unspecified dementia type (HCC)   History of seizure disorder   Syncope, unspecified syncope type        Disposition:  ED Disposition     ED Disposition   Discharge    Condition   Stable    Comment   --             This medical record created using voice recognition software.    DO Dante Bledsoe Scott, DO  05/21/22 0412

## 2022-05-20 NOTE — DISCHARGE INSTRUCTIONS
Please follow-up for reevaluation with your primary care physician tomorrow.    Please review your Keppra level that was ordered today with your primary care physician as it has not resulted yet    Please review your thyroid panel with your primary care physician and discuss the need to lower your Synthroid dose to the primary care physician as it is slightly elevated today.  Please hold your Synthroid until discussed with your primary care physician    Please return the patient to the emergency room for any passing out spells, chest pain, chest pressure, change in mental status, vomiting, headache, neurological deficits, fever or any new symptoms you are concerned with

## 2022-05-24 LAB — LEVETIRACETAM SERPL-MCNC: 22.7 UG/ML (ref 10–40)

## 2022-05-30 ENCOUNTER — HOSPITAL ENCOUNTER (EMERGENCY)
Facility: HOSPITAL | Age: 82
Discharge: NURSING, REHAB OR LONG-TERM CARE | End: 2022-05-30
Attending: EMERGENCY MEDICINE | Admitting: EMERGENCY MEDICINE

## 2022-05-30 ENCOUNTER — APPOINTMENT (OUTPATIENT)
Dept: GENERAL RADIOLOGY | Facility: HOSPITAL | Age: 82
End: 2022-05-30

## 2022-05-30 VITALS
RESPIRATION RATE: 20 BRPM | HEIGHT: 62 IN | OXYGEN SATURATION: 96 % | TEMPERATURE: 98.7 F | WEIGHT: 153.22 LBS | HEART RATE: 62 BPM | BODY MASS INDEX: 28.2 KG/M2 | SYSTOLIC BLOOD PRESSURE: 112 MMHG | DIASTOLIC BLOOD PRESSURE: 60 MMHG

## 2022-05-30 DIAGNOSIS — R56.9 SEIZURE: Primary | ICD-10-CM

## 2022-05-30 LAB
ALBUMIN SERPL-MCNC: 3.9 G/DL (ref 3.5–5.2)
ALBUMIN/GLOB SERPL: 1 G/DL
ALP SERPL-CCNC: 50 U/L (ref 39–117)
ALT SERPL W P-5'-P-CCNC: 23 U/L (ref 1–33)
ANION GAP SERPL CALCULATED.3IONS-SCNC: 15.1 MMOL/L (ref 5–15)
AST SERPL-CCNC: 25 U/L (ref 1–32)
BASOPHILS # BLD AUTO: 0.05 10*3/MM3 (ref 0–0.2)
BASOPHILS NFR BLD AUTO: 0.5 % (ref 0–1.5)
BILIRUB SERPL-MCNC: 0.3 MG/DL (ref 0–1.2)
BUN SERPL-MCNC: 26 MG/DL (ref 8–23)
BUN/CREAT SERPL: 22.6 (ref 7–25)
CALCIUM SPEC-SCNC: 9.5 MG/DL (ref 8.6–10.5)
CHLORIDE SERPL-SCNC: 101 MMOL/L (ref 98–107)
CO2 SERPL-SCNC: 22.9 MMOL/L (ref 22–29)
CREAT SERPL-MCNC: 1.15 MG/DL (ref 0.57–1)
DEPRECATED RDW RBC AUTO: 46.6 FL (ref 37–54)
EGFRCR SERPLBLD CKD-EPI 2021: 48.6 ML/MIN/1.73
EOSINOPHIL # BLD AUTO: 0.22 10*3/MM3 (ref 0–0.4)
EOSINOPHIL NFR BLD AUTO: 2.1 % (ref 0.3–6.2)
ERYTHROCYTE [DISTWIDTH] IN BLOOD BY AUTOMATED COUNT: 13.1 % (ref 12.3–15.4)
GLOBULIN UR ELPH-MCNC: 4.1 GM/DL
GLUCOSE SERPL-MCNC: 177 MG/DL (ref 65–99)
HCT VFR BLD AUTO: 37 % (ref 34–46.6)
HGB BLD-MCNC: 11.9 G/DL (ref 12–15.9)
HOLD SPECIMEN: NORMAL
HOLD SPECIMEN: NORMAL
IMM GRANULOCYTES # BLD AUTO: 0.05 10*3/MM3 (ref 0–0.05)
IMM GRANULOCYTES NFR BLD AUTO: 0.5 % (ref 0–0.5)
LYMPHOCYTES # BLD AUTO: 3.51 10*3/MM3 (ref 0.7–3.1)
LYMPHOCYTES NFR BLD AUTO: 33 % (ref 19.6–45.3)
MAGNESIUM SERPL-MCNC: 2 MG/DL (ref 1.6–2.4)
MCH RBC QN AUTO: 31.2 PG (ref 26.6–33)
MCHC RBC AUTO-ENTMCNC: 32.2 G/DL (ref 31.5–35.7)
MCV RBC AUTO: 97.1 FL (ref 79–97)
MONOCYTES # BLD AUTO: 0.58 10*3/MM3 (ref 0.1–0.9)
MONOCYTES NFR BLD AUTO: 5.5 % (ref 5–12)
NEUTROPHILS NFR BLD AUTO: 58.4 % (ref 42.7–76)
NEUTROPHILS NFR BLD AUTO: 6.22 10*3/MM3 (ref 1.7–7)
NRBC BLD AUTO-RTO: 0 /100 WBC (ref 0–0.2)
PLATELET # BLD AUTO: 279 10*3/MM3 (ref 140–450)
PMV BLD AUTO: 11.7 FL (ref 6–12)
POTASSIUM SERPL-SCNC: 3.6 MMOL/L (ref 3.5–5.2)
PROT SERPL-MCNC: 8 G/DL (ref 6–8.5)
RBC # BLD AUTO: 3.81 10*6/MM3 (ref 3.77–5.28)
SODIUM SERPL-SCNC: 139 MMOL/L (ref 136–145)
TROPONIN T SERPL-MCNC: <0.01 NG/ML (ref 0–0.03)
WBC NRBC COR # BLD: 10.63 10*3/MM3 (ref 3.4–10.8)
WHOLE BLOOD HOLD COAG: NORMAL
WHOLE BLOOD HOLD SPECIMEN: NORMAL

## 2022-05-30 PROCEDURE — 84484 ASSAY OF TROPONIN QUANT: CPT

## 2022-05-30 PROCEDURE — 83735 ASSAY OF MAGNESIUM: CPT

## 2022-05-30 PROCEDURE — 0 LEVETIRACETAM IN NACL 0.75% 1000 MG/100ML SOLUTION: Performed by: EMERGENCY MEDICINE

## 2022-05-30 PROCEDURE — 36415 COLL VENOUS BLD VENIPUNCTURE: CPT

## 2022-05-30 PROCEDURE — 85025 COMPLETE CBC W/AUTO DIFF WBC: CPT

## 2022-05-30 PROCEDURE — 99284 EMERGENCY DEPT VISIT MOD MDM: CPT

## 2022-05-30 PROCEDURE — 93005 ELECTROCARDIOGRAM TRACING: CPT

## 2022-05-30 PROCEDURE — 96374 THER/PROPH/DIAG INJ IV PUSH: CPT

## 2022-05-30 PROCEDURE — 93005 ELECTROCARDIOGRAM TRACING: CPT | Performed by: EMERGENCY MEDICINE

## 2022-05-30 PROCEDURE — 71045 X-RAY EXAM CHEST 1 VIEW: CPT

## 2022-05-30 PROCEDURE — 80053 COMPREHEN METABOLIC PANEL: CPT

## 2022-05-30 RX ORDER — SODIUM CHLORIDE 0.9 % (FLUSH) 0.9 %
10 SYRINGE (ML) INJECTION AS NEEDED
Status: DISCONTINUED | OUTPATIENT
Start: 2022-05-30 | End: 2022-05-31 | Stop reason: HOSPADM

## 2022-05-30 RX ORDER — LEVETIRACETAM 500 MG/1
1000 TABLET ORAL 2 TIMES DAILY
Qty: 30 TABLET | Refills: 0 | Status: SHIPPED | OUTPATIENT
Start: 2022-05-30

## 2022-05-30 RX ORDER — LEVETIRACETAM 500 MG/1
1000 TABLET ORAL 2 TIMES DAILY
Qty: 30 TABLET | Refills: 0 | Status: SHIPPED | OUTPATIENT
Start: 2022-05-30 | End: 2022-05-30 | Stop reason: SDUPTHER

## 2022-05-30 RX ORDER — LEVETIRACETAM 10 MG/ML
1000 INJECTION INTRAVASCULAR ONCE
Status: COMPLETED | OUTPATIENT
Start: 2022-05-30 | End: 2022-05-30

## 2022-05-30 RX ADMIN — LEVETIRACETAM 1000 MG: 10 INJECTION INTRAVENOUS at 20:07

## 2022-05-31 LAB — QT INTERVAL: 423 MS

## 2022-08-10 ENCOUNTER — HOSPITAL ENCOUNTER (EMERGENCY)
Facility: HOSPITAL | Age: 82
Discharge: NURSING, REHAB OR LONG-TERM CARE | End: 2022-08-10
Attending: EMERGENCY MEDICINE | Admitting: EMERGENCY MEDICINE

## 2022-08-10 ENCOUNTER — APPOINTMENT (OUTPATIENT)
Dept: CT IMAGING | Facility: HOSPITAL | Age: 82
End: 2022-08-10

## 2022-08-10 VITALS
SYSTOLIC BLOOD PRESSURE: 112 MMHG | BODY MASS INDEX: 22.42 KG/M2 | HEIGHT: 68 IN | OXYGEN SATURATION: 66 % | RESPIRATION RATE: 20 BRPM | HEART RATE: 76 BPM | TEMPERATURE: 97.9 F | DIASTOLIC BLOOD PRESSURE: 47 MMHG | WEIGHT: 147.93 LBS

## 2022-08-10 DIAGNOSIS — S09.90XA INJURY OF HEAD, INITIAL ENCOUNTER: ICD-10-CM

## 2022-08-10 DIAGNOSIS — S01.01XA LACERATION OF SCALP, INITIAL ENCOUNTER: ICD-10-CM
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
## 2022-08-10 PROCEDURE — 70450 CT HEAD/BRAIN W/O DYE: CPT

## 2022-08-10 PROCEDURE — 99283 EMERGENCY DEPT VISIT LOW MDM: CPT

## 2022-08-10 RX ORDER — LIDOCAINE HYDROCHLORIDE AND EPINEPHRINE 10; 10 MG/ML; UG/ML
10 INJECTION, SOLUTION INFILTRATION; PERINEURAL ONCE
Status: COMPLETED | OUTPATIENT
Start: 2022-08-10 | End: 2022-08-10

## 2022-08-10 RX ADMIN — LIDOCAINE HYDROCHLORIDE,EPINEPHRINE BITARTRATE 10 ML: 10; .01 INJECTION, SOLUTION INFILTRATION; PERINEURAL at 06:20

## 2022-08-10 NOTE — ED PROVIDER NOTES
Time: 6:39 AM EDT  Arrived by: ambulance  Chief Complaint:   Chief Complaint   Patient presents with   • Fall     History provided by: EMS/nursing facility  History is limited by: Patient has dementia and is non verbal     History of Present Illness:  Patient is a 82 y.o. year old female that presents to the emergency department after an unwitnessed fall.  Patient unable to give history.  Per report no known loss of consciousness.  Patient is at baseline mental state per report.  Other than laceration to right scalp no other findings on exam. No focal neurological deficits    HPI    Similar Symptoms Previously: unknown  Recently seen: unknown      Patient Care Team  Primary Care Provider: Laurent Blanco MD    Past Medical History:     Allergies   Allergen Reactions   • Erythromycin Anaphylaxis   • Bacitracin Unknown - High Severity   • Cefazolin Unknown - High Severity   • Cephalosporins Unknown - High Severity   • Neomycin Unknown - High Severity   • Penicillins Unknown - High Severity   • Polymox [Amoxicillin] Unknown - High Severity   • Prednisone Unknown - High Severity   • Quinolones Unknown - High Severity   • Statins Unknown - High Severity   • Sulfa Antibiotics Unknown - High Severity   • Trimethoprim Unknown - High Severity     Past Medical History:   Diagnosis Date   • Anemia    • COPD (chronic obstructive pulmonary disease) ([facility])    • Coronary artery disease    • Dementia ([facility])    • Disease of thyroid gland    • Hyperkalemia    • Hyperlipidemia    • Hypertension    • Seizures ([facility])      History reviewed. No pertinent surgical history.  History reviewed. No pertinent family history.    Home Medications:  Prior to Admission medications    Medication Sig Start Date End Date Taking? Authorizing Provider   acetaminophen (TYLENOL) 500 MG tablet Take 500 mg by mouth Every 6 (Six) Hours As Needed for Mild Pain  or Fever.    Provider, MD Branden   albuterol (PROVENTIL) (2.5 MG/3ML) 0.083% nebulizer  solution Take 2.5 mg by nebulization Every 4 (Four) Hours As Needed for Wheezing or Shortness of Air.    Branden Marion MD   amLODIPine (NORVASC) 5 MG tablet Take 0.5 tablets by mouth Daily. 5/4/22   Tariq Hernandes DO   donepezil (ARICEPT) 10 MG tablet Take 10 mg by mouth Every Night.    Branden Marion MD   fenofibrate 160 MG tablet Take 160 mg by mouth Daily.    Branden Marion MD   Flaxseed, Linseed, (Flax Seed Oil) 1000 MG capsule Take 1,000 mg by mouth Daily.    Branden Marion MD   furosemide (LASIX) 20 MG tablet Take 20 mg by mouth Daily.    Branden Marion MD   guaiFENesin (MUCINEX) 600 MG 12 hr tablet Take 2 tablets by mouth 2 (Two) Times a Day. 5/4/22   Tariq Hernandes DO   ketoconazole (NIZORAL) 2 % cream Apply 1 application topically to the appropriate area as directed 2 (Two) Times a Day.    Branden Marion MD   latanoprost (XALATAN) 0.005 % ophthalmic solution Administer 1 drop to both eyes Every Night.    Branden Marion MD   levETIRAcetam (KEPPRA) 500 MG tablet Take 500 mg by mouth 2 (Two) Times a Day.    Branden Marion MD   levETIRAcetam (KEPPRA) 500 MG tablet Take 2 tablets by mouth 2 (Two) Times a Day. 5/30/22   Jarrod Yost DO   levothyroxine (SYNTHROID, LEVOTHROID) 300 MCG tablet Take 300 mcg by mouth Daily.    Branden Marion MD   Lidocaine HCl (LMX) 4 % cream Apply 1 application topically to the appropriate area as directed 2 (Two) Times a Day. Apply to lower back    Branden Marion MD   loperamide (IMODIUM) 2 MG capsule Take 2 mg by mouth 4 (Four) Times a Day As Needed for Diarrhea.    Branden Marion MD   memantine (NAMENDA) 10 MG tablet Take 10 mg by mouth 2 (Two) Times a Day.    Branden Marion MD        Social History:   Social History     Tobacco Use   • Smoking status: Never Smoker   • Smokeless tobacco: Never Used   Vaping Use   • Vaping Use: Never used   Substance Use Topics   • Alcohol use: Not Currently   • Drug  "use: Never     Recent travel: not applicable     Review of Systems:  Review of Systems   Unable to perform ROS: Dementia        Physical Exam:  /47   Pulse 76   Temp 97.9 °F (36.6 °C) (Oral)   Resp 20   Ht 172.7 cm (68\")   Wt 67.1 kg (147 lb 14.9 oz)   SpO2 (!) 66%   BMI 22.49 kg/m²     Physical Exam  Constitutional:       General: She is not in acute distress.     Appearance: Normal appearance.   HENT:      Head: Normocephalic.      Comments: Laceration to right temporal area approximately 3cm. Minimal bleeding.  Puncture wound to right frontal region with hematoma. Minimal bleeding     Right Ear: External ear normal.      Left Ear: External ear normal.      Nose: Nose normal.      Mouth/Throat:      Mouth: Mucous membranes are moist.   Eyes:      Extraocular Movements: Extraocular movements intact.      Pupils: Pupils are equal, round, and reactive to light.   Cardiovascular:      Rate and Rhythm: Normal rate and regular rhythm.      Pulses: Normal pulses.      Heart sounds: Normal heart sounds.   Pulmonary:      Effort: Pulmonary effort is normal.      Breath sounds: Normal breath sounds.   Abdominal:      General: Abdomen is flat.      Palpations: Abdomen is soft.   Musculoskeletal:      Cervical back: Normal range of motion.   Skin:     General: Skin is warm and dry.      Capillary Refill: Capillary refill takes less than 2 seconds.   Neurological:      Mental Status: She is alert. Mental status is at baseline.      Cranial Nerves: No cranial nerve deficit.      Motor: No weakness.                Medications in the Emergency Department:  Medications   lidocaine 1% - EPINEPHrine 1:[id number] (XYLOCAINE W/EPI) 1 %-1:374347 injection 10 mL (10 mL Other Given by Other 8/10/22 0620)        Labs  Lab Results (last 24 hours)     ** No results found for the last 24 hours. **           Imaging:  CT Head Without Contrast    Result Date: 8/10/2022  PROCEDURE: CT HEAD WO CONTRAST  COMPARISONS: 4/3/2021, " 12/25/2021, and 5/19/2022.   INDICATIONS: HEAD TRAUMA; ABNORMAL MENTAL STATUS; LACERATION RIGHT TEMPLE.  PROTOCOL:   Standard CT imaging protocol performed.    RADIATION:   Total DLP: 1,018.2 mGy*cm.   MA and/or KV were/was adjusted to minimize radiation dose.    TECHNIQUE: After obtaining the patient's consent, 130 CT images were obtained without non-ionic intravenous contrast material.  FINDINGS: There is an acute right frontal scalp contusion with a hemorrhagic subgaleal component and subcutaneous and subgaleal gas foci, suggesting laceration(s), such as seen on image 48 of series 201 and adjacent images.  This finding is roughly estimated at 5.5 x 0.9 cm.  No acute intracranial hemorrhage is seen.  No acute skull fracture.  No definite acute infarct.  There is a ventriculoperitoneal () shunt in place.  The proximal limb ventricular catheter is in place from a right parietal approach.  Its distal portion crosses the midline and its distal tip resides within the left frontal lobe, as seen on image 39 of series 201 and series 202.  It is thought to be in a similar location to prior studies.  Extensive encephalomalacia involves the right cerebral hemisphere, especially the right temporal lobe and possibly extending into portions of the right frontal and right parietal lobes.  To a lesser extent, encephalomalacia is seen contralaterally, especially in the opercular left frontal lobe.  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  Bilateral pterional craniotomies are noted, as before.  There are prominent extra-axial spaces bilaterally, which may represent chronic subdural hematomas or subdural hygromas.  The subarachnoid spaces are also prominent, suggesting central atrophy.  No change in the size or configuration of the ventricular system.  No significant change in the extra-axial spaces.  There may be mild mucosal  thickening within the imaged paranasal sinuses.  No air-fluid interfaces are seen within the imaged paranasal sinuses.  A canal-wall-up right mastoidectomy is noted, seen previously.  Otherwise, the bilateral middle ear clefts are thought to be well aerated.  Mild degenerative changes involve the temporomandibular joints.  There are degenerative changes of the partially imaged atlantoaxial joint.  The patient has undergone bilateral cataract extractions with intra-ocular lens implants.  No acute orbital findings are seen.  There are arterial calcifications.       There is a large acute right frontal scalp contusion with laceration.  No acute skull fracture.  No acute intracranial hemorrhage.  Otherwise, no significant interval change is seen since the prior CT exams with findings as detailed above.     COMMENT:  Part of this note is an electronic transcription of spoken language to printed text. The electronic translation/transcription may permit erroneous, or at times, nonsensical (or even sensical) words or phrases to be inadvertently transcribed or omitted; this  has reviewed the note for such errors (as well as additional errors); however, some may still exist.  CATHY THAPA JR, MD       Electronically Signed and Approved By: CATHY THAPA JR, MD on 8/10/2022 at 6:34                Procedures:  Laceration Repair    Date/Time: 8/10/2022 6:43 AM  Performed by: Steve Cleaning MD  Authorized by: Steve Cleaning MD     Consent:     Consent obtained:  Emergent situation  Universal protocol:     Patient identity confirmed:  Arm band  Anesthesia:     Anesthesia method:  Local infiltration    Local anesthetic:  Lidocaine 1% WITH epi  Laceration details:     Location:  Scalp    Scalp location:  R temporal    Length (cm):  3    Depth (mm):  5  Pre-procedure details:     Preparation:  Patient was prepped and draped in usual sterile fashion and imaging obtained to evaluate for foreign  bodies  Exploration:     Hemostasis achieved with:  Direct pressure    Imaging obtained comment:  CT head    Imaging outcome: foreign body not noted      Wound exploration: entire depth of wound visualized      Wound extent: no muscle damage noted, no nerve damage noted, no tendon damage noted and no underlying fracture noted      Contaminated: no    Treatment:     Area cleansed with:  Saline    Amount of cleaning:  Standard    Irrigation solution:  Sterile saline    Irrigation method:  Syringe    Debridement:  None    Undermining:  None    Scar revision: no    Skin repair:     Repair method:  Sutures    Suture size:  5-0    Suture material:  Prolene    Suture technique:  Simple interrupted    Number of sutures:  5  Approximation:     Approximation:  Close  Post-procedure details:     Dressing:  Open (no dressing)    Procedure completion:  Tolerated well, no immediate complications        Progress                            Medical Decision Making:  MDM  Number of Diagnoses or Management Options  Diagnosis management comments: Patient presents emergency department after ground-level fall.  On exam she has a laceration to her right temporal region as well as a small puncture wound.  Bleeding controlled.  Laceration was repaired in the emergency department.  CT of her head showed no acute findings.  She does not appear to have any focal neurological deficits.  Per report she is at baseline mental status.  Patient was discharged back to nursing facility.  Recommend close follow-up with her primary physician.  Discussed return precautions, discharge instructions and answered all their questions.       Amount and/or Complexity of Data Reviewed  Tests in the radiology section of CPT®: ordered and reviewed    Risk of Complications, Morbidity, and/or Mortality  Presenting problems: moderate  Management options: moderate    Patient Progress  Patient progress: stable       Final diagnoses:   Fall, initial encounter   Injury  of head, initial encounter   Laceration of scalp, initial encounter        Disposition:  ED Disposition     ED Disposition   Discharge    Condition   --    Comment   --             This medical record created using voice recognition software and may contain unintended errors.       Steve Cleaning MD  08/10/22 1442

## 2022-08-29 ENCOUNTER — HOSPITAL ENCOUNTER (INPATIENT)
Facility: HOSPITAL | Age: 82
LOS: 3 days | Discharge: NURSING, REHAB OR LONG-TERM CARE | End: 2022-09-01
Attending: EMERGENCY MEDICINE | Admitting: INTERNAL MEDICINE

## 2022-08-29 ENCOUNTER — APPOINTMENT (OUTPATIENT)
Dept: GENERAL RADIOLOGY | Facility: HOSPITAL | Age: 82
End: 2022-08-29

## 2022-08-29 ENCOUNTER — APPOINTMENT (OUTPATIENT)
Dept: CT IMAGING | Facility: HOSPITAL | Age: 82
End: 2022-08-29

## 2022-08-29 DIAGNOSIS — A41.9 SEPSIS, DUE TO UNSPECIFIED ORGANISM, UNSPECIFIED WHETHER ACUTE ORGAN DYSFUNCTION PRESENT: Primary | ICD-10-CM

## 2022-08-29 DIAGNOSIS — R13.12 OROPHARYNGEAL DYSPHAGIA: ICD-10-CM

## 2022-08-29 DIAGNOSIS — N39.0 ACUTE UTI: ICD-10-CM

## 2022-08-29 DIAGNOSIS — R26.2 DIFFICULTY WALKING: ICD-10-CM

## 2022-08-29 LAB
ALBUMIN SERPL-MCNC: 3.7 G/DL (ref 3.5–5.2)
ALBUMIN/GLOB SERPL: 1.3 G/DL
ALP SERPL-CCNC: 42 U/L (ref 39–117)
ALT SERPL W P-5'-P-CCNC: 22 U/L (ref 1–33)
ANION GAP SERPL CALCULATED.3IONS-SCNC: 9.1 MMOL/L (ref 5–15)
AST SERPL-CCNC: 24 U/L (ref 1–32)
BACTERIA UR QL AUTO: ABNORMAL /HPF
BASOPHILS # BLD AUTO: 0.03 10*3/MM3 (ref 0–0.2)
BASOPHILS NFR BLD AUTO: 0.1 % (ref 0–1.5)
BILIRUB SERPL-MCNC: 0.3 MG/DL (ref 0–1.2)
BILIRUB UR QL STRIP: NEGATIVE
BUN SERPL-MCNC: 34 MG/DL (ref 8–23)
BUN/CREAT SERPL: 34.7 (ref 7–25)
CALCIUM SPEC-SCNC: 8.9 MG/DL (ref 8.6–10.5)
CHLORIDE SERPL-SCNC: 105 MMOL/L (ref 98–107)
CK SERPL-CCNC: 96 U/L (ref 20–180)
CLARITY UR: ABNORMAL
CO2 SERPL-SCNC: 27.9 MMOL/L (ref 22–29)
COLOR UR: YELLOW
CREAT SERPL-MCNC: 0.98 MG/DL (ref 0.57–1)
D-LACTATE SERPL-SCNC: 1.3 MMOL/L (ref 0.5–2)
D-LACTATE SERPL-SCNC: 1.4 MMOL/L (ref 0.5–2)
DEPRECATED RDW RBC AUTO: 46.9 FL (ref 37–54)
EGFRCR SERPLBLD CKD-EPI 2021: 58.5 ML/MIN/1.73
EOSINOPHIL # BLD AUTO: 0.08 10*3/MM3 (ref 0–0.4)
EOSINOPHIL NFR BLD AUTO: 0.4 % (ref 0.3–6.2)
ERYTHROCYTE [DISTWIDTH] IN BLOOD BY AUTOMATED COUNT: 14.1 % (ref 12.3–15.4)
GLOBULIN UR ELPH-MCNC: 2.9 GM/DL
GLUCOSE SERPL-MCNC: 101 MG/DL (ref 65–99)
GLUCOSE UR STRIP-MCNC: NEGATIVE MG/DL
HCT VFR BLD AUTO: 35.4 % (ref 34–46.6)
HGB BLD-MCNC: 11.7 G/DL (ref 12–15.9)
HGB UR QL STRIP.AUTO: NEGATIVE
HYALINE CASTS UR QL AUTO: ABNORMAL /LPF
IMM GRANULOCYTES # BLD AUTO: 0.13 10*3/MM3 (ref 0–0.05)
IMM GRANULOCYTES NFR BLD AUTO: 0.6 % (ref 0–0.5)
KETONES UR QL STRIP: NEGATIVE
LEUKOCYTE ESTERASE UR QL STRIP.AUTO: ABNORMAL
LYMPHOCYTES # BLD AUTO: 2.89 10*3/MM3 (ref 0.7–3.1)
LYMPHOCYTES NFR BLD AUTO: 13.9 % (ref 19.6–45.3)
MAGNESIUM SERPL-MCNC: 2 MG/DL (ref 1.6–2.4)
MCH RBC QN AUTO: 30.3 PG (ref 26.6–33)
MCHC RBC AUTO-ENTMCNC: 33.1 G/DL (ref 31.5–35.7)
MCV RBC AUTO: 91.7 FL (ref 79–97)
MONOCYTES # BLD AUTO: 1.22 10*3/MM3 (ref 0.1–0.9)
MONOCYTES NFR BLD AUTO: 5.9 % (ref 5–12)
MRSA DNA SPEC QL NAA+PROBE: NORMAL
NEUTROPHILS NFR BLD AUTO: 16.43 10*3/MM3 (ref 1.7–7)
NEUTROPHILS NFR BLD AUTO: 79.1 % (ref 42.7–76)
NITRITE UR QL STRIP: POSITIVE
NRBC BLD AUTO-RTO: 0 /100 WBC (ref 0–0.2)
PH UR STRIP.AUTO: 6.5 [PH] (ref 5–8)
PLATELET # BLD AUTO: 266 10*3/MM3 (ref 140–450)
PMV BLD AUTO: 11.7 FL (ref 6–12)
POTASSIUM SERPL-SCNC: 3.7 MMOL/L (ref 3.5–5.2)
PROT SERPL-MCNC: 6.6 G/DL (ref 6–8.5)
PROT UR QL STRIP: NEGATIVE
RBC # BLD AUTO: 3.86 10*6/MM3 (ref 3.77–5.28)
RBC # UR STRIP: ABNORMAL /HPF
REF LAB TEST METHOD: ABNORMAL
SODIUM SERPL-SCNC: 142 MMOL/L (ref 136–145)
SP GR UR STRIP: 1.02 (ref 1–1.03)
SQUAMOUS #/AREA URNS HPF: ABNORMAL /HPF
T4 FREE SERPL-MCNC: 3.04 NG/DL (ref 0.93–1.7)
TSH SERPL DL<=0.05 MIU/L-ACNC: 0.07 UIU/ML (ref 0.27–4.2)
UROBILINOGEN UR QL STRIP: ABNORMAL
WBC # UR STRIP: ABNORMAL /HPF
WBC NRBC COR # BLD: 20.78 10*3/MM3 (ref 3.4–10.8)

## 2022-08-29 PROCEDURE — 80053 COMPREHEN METABOLIC PANEL: CPT | Performed by: EMERGENCY MEDICINE

## 2022-08-29 PROCEDURE — 83605 ASSAY OF LACTIC ACID: CPT | Performed by: INTERNAL MEDICINE

## 2022-08-29 PROCEDURE — 87186 SC STD MICRODIL/AGAR DIL: CPT | Performed by: EMERGENCY MEDICINE

## 2022-08-29 PROCEDURE — 83605 ASSAY OF LACTIC ACID: CPT | Performed by: EMERGENCY MEDICINE

## 2022-08-29 PROCEDURE — 85025 COMPLETE CBC W/AUTO DIFF WBC: CPT | Performed by: EMERGENCY MEDICINE

## 2022-08-29 PROCEDURE — 73130 X-RAY EXAM OF HAND: CPT

## 2022-08-29 PROCEDURE — 25010000002 ENOXAPARIN PER 10 MG: Performed by: INTERNAL MEDICINE

## 2022-08-29 PROCEDURE — 99285 EMERGENCY DEPT VISIT HI MDM: CPT

## 2022-08-29 PROCEDURE — 84443 ASSAY THYROID STIM HORMONE: CPT | Performed by: INTERNAL MEDICINE

## 2022-08-29 PROCEDURE — 70450 CT HEAD/BRAIN W/O DYE: CPT

## 2022-08-29 PROCEDURE — 99222 1ST HOSP IP/OBS MODERATE 55: CPT | Performed by: INTERNAL MEDICINE

## 2022-08-29 PROCEDURE — 87040 BLOOD CULTURE FOR BACTERIA: CPT | Performed by: INTERNAL MEDICINE

## 2022-08-29 PROCEDURE — 82550 ASSAY OF CK (CPK): CPT | Performed by: EMERGENCY MEDICINE

## 2022-08-29 PROCEDURE — 25010000002 MEROPENEM PER 100 MG: Performed by: EMERGENCY MEDICINE

## 2022-08-29 PROCEDURE — 73120 X-RAY EXAM OF HAND: CPT

## 2022-08-29 PROCEDURE — 87086 URINE CULTURE/COLONY COUNT: CPT | Performed by: EMERGENCY MEDICINE

## 2022-08-29 PROCEDURE — 84439 ASSAY OF FREE THYROXINE: CPT | Performed by: INTERNAL MEDICINE

## 2022-08-29 PROCEDURE — 83735 ASSAY OF MAGNESIUM: CPT | Performed by: EMERGENCY MEDICINE

## 2022-08-29 PROCEDURE — 36415 COLL VENOUS BLD VENIPUNCTURE: CPT | Performed by: INTERNAL MEDICINE

## 2022-08-29 PROCEDURE — 25010000002 VANCOMYCIN 5 G RECONSTITUTED SOLUTION: Performed by: EMERGENCY MEDICINE

## 2022-08-29 PROCEDURE — 71045 X-RAY EXAM CHEST 1 VIEW: CPT

## 2022-08-29 PROCEDURE — 87641 MR-STAPH DNA AMP PROBE: CPT | Performed by: INTERNAL MEDICINE

## 2022-08-29 PROCEDURE — 72125 CT NECK SPINE W/O DYE: CPT

## 2022-08-29 PROCEDURE — 25010000002 MEROPENEM PER 100 MG: Performed by: INTERNAL MEDICINE

## 2022-08-29 PROCEDURE — 81001 URINALYSIS AUTO W/SCOPE: CPT | Performed by: EMERGENCY MEDICINE

## 2022-08-29 RX ORDER — ALBUTEROL SULFATE 2.5 MG/3ML
2.5 SOLUTION RESPIRATORY (INHALATION) EVERY 4 HOURS PRN
Status: DISCONTINUED | OUTPATIENT
Start: 2022-08-29 | End: 2022-09-01 | Stop reason: HOSPADM

## 2022-08-29 RX ORDER — BISACODYL 10 MG
10 SUPPOSITORY, RECTAL RECTAL DAILY PRN
Status: DISCONTINUED | OUTPATIENT
Start: 2022-08-29 | End: 2022-09-01 | Stop reason: HOSPADM

## 2022-08-29 RX ORDER — LEVOTHYROXINE SODIUM 0.15 MG/1
300 TABLET ORAL
Status: DISCONTINUED | OUTPATIENT
Start: 2022-08-30 | End: 2022-09-01 | Stop reason: HOSPADM

## 2022-08-29 RX ORDER — LEVETIRACETAM 500 MG/1
1000 TABLET ORAL NIGHTLY
Status: DISCONTINUED | OUTPATIENT
Start: 2022-08-29 | End: 2022-09-01 | Stop reason: HOSPADM

## 2022-08-29 RX ORDER — LEVETIRACETAM 750 MG/1
750 TABLET ORAL DAILY
Status: DISCONTINUED | OUTPATIENT
Start: 2022-08-30 | End: 2022-09-01 | Stop reason: HOSPADM

## 2022-08-29 RX ORDER — LATANOPROST 50 UG/ML
1 SOLUTION/ DROPS OPHTHALMIC NIGHTLY
Status: DISCONTINUED | OUTPATIENT
Start: 2022-08-29 | End: 2022-09-01 | Stop reason: HOSPADM

## 2022-08-29 RX ORDER — ACETAMINOPHEN 500 MG
500 TABLET ORAL EVERY 6 HOURS PRN
Status: DISCONTINUED | OUTPATIENT
Start: 2022-08-29 | End: 2022-09-01 | Stop reason: HOSPADM

## 2022-08-29 RX ORDER — NITROGLYCERIN 0.4 MG/1
0.4 TABLET SUBLINGUAL
Status: DISCONTINUED | OUTPATIENT
Start: 2022-08-29 | End: 2022-09-01 | Stop reason: HOSPADM

## 2022-08-29 RX ORDER — MEMANTINE HYDROCHLORIDE 10 MG/1
10 TABLET ORAL 2 TIMES DAILY
Status: DISCONTINUED | OUTPATIENT
Start: 2022-08-29 | End: 2022-09-01 | Stop reason: HOSPADM

## 2022-08-29 RX ORDER — SODIUM CHLORIDE 0.9 % (FLUSH) 0.9 %
10 SYRINGE (ML) INJECTION AS NEEDED
Status: DISCONTINUED | OUTPATIENT
Start: 2022-08-29 | End: 2022-09-01 | Stop reason: HOSPADM

## 2022-08-29 RX ORDER — ACETAMINOPHEN 325 MG/1
650 TABLET ORAL EVERY 4 HOURS PRN
Status: DISCONTINUED | OUTPATIENT
Start: 2022-08-29 | End: 2022-08-29 | Stop reason: ALTCHOICE

## 2022-08-29 RX ORDER — SODIUM CHLORIDE 9 MG/ML
40 INJECTION, SOLUTION INTRAVENOUS AS NEEDED
Status: DISCONTINUED | OUTPATIENT
Start: 2022-08-29 | End: 2022-09-01 | Stop reason: HOSPADM

## 2022-08-29 RX ORDER — POTASSIUM CHLORIDE 600 MG/1
8 CAPSULE, EXTENDED RELEASE ORAL DAILY
COMMUNITY

## 2022-08-29 RX ORDER — SODIUM CHLORIDE 0.9 % (FLUSH) 0.9 %
10 SYRINGE (ML) INJECTION EVERY 12 HOURS SCHEDULED
Status: DISCONTINUED | OUTPATIENT
Start: 2022-08-29 | End: 2022-09-01 | Stop reason: HOSPADM

## 2022-08-29 RX ORDER — BISACODYL 5 MG/1
5 TABLET, DELAYED RELEASE ORAL DAILY PRN
Status: DISCONTINUED | OUTPATIENT
Start: 2022-08-29 | End: 2022-09-01 | Stop reason: HOSPADM

## 2022-08-29 RX ORDER — SODIUM CHLORIDE 9 MG/ML
75 INJECTION, SOLUTION INTRAVENOUS CONTINUOUS
Status: DISCONTINUED | OUTPATIENT
Start: 2022-08-29 | End: 2022-08-31

## 2022-08-29 RX ORDER — POLYETHYLENE GLYCOL 3350 17 G/17G
17 POWDER, FOR SOLUTION ORAL DAILY PRN
Status: DISCONTINUED | OUTPATIENT
Start: 2022-08-29 | End: 2022-09-01 | Stop reason: HOSPADM

## 2022-08-29 RX ORDER — AMOXICILLIN 250 MG
2 CAPSULE ORAL 2 TIMES DAILY
Status: DISCONTINUED | OUTPATIENT
Start: 2022-08-29 | End: 2022-09-01 | Stop reason: HOSPADM

## 2022-08-29 RX ORDER — LEVETIRACETAM 10 MG/ML
1000 INJECTION INTRAVASCULAR ONCE
Status: COMPLETED | OUTPATIENT
Start: 2022-08-29 | End: 2022-08-30

## 2022-08-29 RX ORDER — PREDNISONE 10 MG/1
40 TABLET ORAL DAILY
Status: DISCONTINUED | OUTPATIENT
Start: 2022-08-29 | End: 2022-09-01 | Stop reason: HOSPADM

## 2022-08-29 RX ORDER — CHOLECALCIFEROL (VITAMIN D3) 125 MCG
5 CAPSULE ORAL NIGHTLY PRN
Status: DISCONTINUED | OUTPATIENT
Start: 2022-08-29 | End: 2022-09-01 | Stop reason: HOSPADM

## 2022-08-29 RX ORDER — ENOXAPARIN SODIUM 100 MG/ML
40 INJECTION SUBCUTANEOUS EVERY 24 HOURS
Status: DISCONTINUED | OUTPATIENT
Start: 2022-08-29 | End: 2022-09-01 | Stop reason: HOSPADM

## 2022-08-29 RX ORDER — PREDNISONE 20 MG/1
40 TABLET ORAL DAILY
COMMUNITY
Start: 2022-08-16 | End: 2022-09-05

## 2022-08-29 RX ORDER — DONEPEZIL HYDROCHLORIDE 10 MG/1
10 TABLET, FILM COATED ORAL NIGHTLY
Status: DISCONTINUED | OUTPATIENT
Start: 2022-08-29 | End: 2022-09-01 | Stop reason: HOSPADM

## 2022-08-29 RX ADMIN — VANCOMYCIN HYDROCHLORIDE 1500 MG: 5 INJECTION, POWDER, LYOPHILIZED, FOR SOLUTION INTRAVENOUS at 09:30

## 2022-08-29 RX ADMIN — MEROPENEM 500 MG: 500 INJECTION INTRAVENOUS at 16:56

## 2022-08-29 RX ADMIN — MEROPENEM 1 G: 1 INJECTION, POWDER, FOR SOLUTION INTRAVENOUS at 08:50

## 2022-08-29 RX ADMIN — SODIUM CHLORIDE 1000 ML: 9 INJECTION, SOLUTION INTRAVENOUS at 07:24

## 2022-08-29 RX ADMIN — SODIUM CHLORIDE 100 ML/HR: 9 INJECTION, SOLUTION INTRAVENOUS at 16:56

## 2022-08-29 RX ADMIN — ENOXAPARIN SODIUM 40 MG: 100 INJECTION SUBCUTANEOUS at 16:56

## 2022-08-30 LAB
ANION GAP SERPL CALCULATED.3IONS-SCNC: 10.7 MMOL/L (ref 5–15)
BUN SERPL-MCNC: 25 MG/DL (ref 8–23)
BUN/CREAT SERPL: 33.3 (ref 7–25)
CALCIUM SPEC-SCNC: 8.3 MG/DL (ref 8.6–10.5)
CHLORIDE SERPL-SCNC: 107 MMOL/L (ref 98–107)
CO2 SERPL-SCNC: 22.3 MMOL/L (ref 22–29)
CREAT SERPL-MCNC: 0.75 MG/DL (ref 0.57–1)
DEPRECATED RDW RBC AUTO: 47.7 FL (ref 37–54)
EGFRCR SERPLBLD CKD-EPI 2021: 80.6 ML/MIN/1.73
ERYTHROCYTE [DISTWIDTH] IN BLOOD BY AUTOMATED COUNT: 14 % (ref 12.3–15.4)
GLUCOSE SERPL-MCNC: 91 MG/DL (ref 65–99)
HCT VFR BLD AUTO: 31.9 % (ref 34–46.6)
HGB BLD-MCNC: 10.4 G/DL (ref 12–15.9)
MAGNESIUM SERPL-MCNC: 1.8 MG/DL (ref 1.6–2.4)
MCH RBC QN AUTO: 30.4 PG (ref 26.6–33)
MCHC RBC AUTO-ENTMCNC: 32.6 G/DL (ref 31.5–35.7)
MCV RBC AUTO: 93.3 FL (ref 79–97)
PHOSPHATE SERPL-MCNC: 2.4 MG/DL (ref 2.5–4.5)
PLATELET # BLD AUTO: 195 10*3/MM3 (ref 140–450)
PMV BLD AUTO: 12.3 FL (ref 6–12)
POTASSIUM SERPL-SCNC: 3.5 MMOL/L (ref 3.5–5.2)
RBC # BLD AUTO: 3.42 10*6/MM3 (ref 3.77–5.28)
SODIUM SERPL-SCNC: 140 MMOL/L (ref 136–145)
WBC NRBC COR # BLD: 8.52 10*3/MM3 (ref 3.4–10.8)

## 2022-08-30 PROCEDURE — 25010000002 MEROPENEM PER 100 MG: Performed by: INTERNAL MEDICINE

## 2022-08-30 PROCEDURE — 84100 ASSAY OF PHOSPHORUS: CPT | Performed by: INTERNAL MEDICINE

## 2022-08-30 PROCEDURE — 25010000002 ENOXAPARIN PER 10 MG: Performed by: INTERNAL MEDICINE

## 2022-08-30 PROCEDURE — 85027 COMPLETE CBC AUTOMATED: CPT | Performed by: INTERNAL MEDICINE

## 2022-08-30 PROCEDURE — 83735 ASSAY OF MAGNESIUM: CPT | Performed by: INTERNAL MEDICINE

## 2022-08-30 PROCEDURE — 80048 BASIC METABOLIC PNL TOTAL CA: CPT | Performed by: INTERNAL MEDICINE

## 2022-08-30 PROCEDURE — 0 LEVETIRACETAM IN NACL 0.75% 1000 MG/100ML SOLUTION: Performed by: PHYSICIAN ASSISTANT

## 2022-08-30 PROCEDURE — 99233 SBSQ HOSP IP/OBS HIGH 50: CPT | Performed by: STUDENT IN AN ORGANIZED HEALTH CARE EDUCATION/TRAINING PROGRAM

## 2022-08-30 PROCEDURE — 92610 EVALUATE SWALLOWING FUNCTION: CPT

## 2022-08-30 PROCEDURE — 92526 ORAL FUNCTION THERAPY: CPT

## 2022-08-30 PROCEDURE — 63710000001 PREDNISONE PER 1 MG: Performed by: INTERNAL MEDICINE

## 2022-08-30 RX ORDER — HALOPERIDOL 5 MG/ML
1 INJECTION INTRAMUSCULAR ONCE AS NEEDED
Status: DISCONTINUED | OUTPATIENT
Start: 2022-08-30 | End: 2022-09-01 | Stop reason: HOSPADM

## 2022-08-30 RX ORDER — FENTANYL/ROPIVACAINE/NS/PF 2-625MCG/1
15 PLASTIC BAG, INJECTION (ML) EPIDURAL ONCE
Status: COMPLETED | OUTPATIENT
Start: 2022-08-30 | End: 2022-08-30

## 2022-08-30 RX ADMIN — SODIUM CHLORIDE 75 ML/HR: 9 INJECTION, SOLUTION INTRAVENOUS at 17:21

## 2022-08-30 RX ADMIN — MEROPENEM 500 MG: 500 INJECTION INTRAVENOUS at 07:16

## 2022-08-30 RX ADMIN — ENOXAPARIN SODIUM 40 MG: 100 INJECTION SUBCUTANEOUS at 12:28

## 2022-08-30 RX ADMIN — Medication 10 ML: at 21:52

## 2022-08-30 RX ADMIN — MEROPENEM 500 MG: 500 INJECTION INTRAVENOUS at 00:35

## 2022-08-30 RX ADMIN — LEVETIRACETAM 750 MG: 750 TABLET, FILM COATED ORAL at 08:14

## 2022-08-30 RX ADMIN — MEROPENEM 500 MG: 500 INJECTION INTRAVENOUS at 17:21

## 2022-08-30 RX ADMIN — DONEPEZIL HYDROCHLORIDE 10 MG: 10 TABLET ORAL at 21:51

## 2022-08-30 RX ADMIN — Medication 10 ML: at 08:15

## 2022-08-30 RX ADMIN — POTASSIUM PHOSPHATE, MONOBASIC AND POTASSIUM PHOSPHATE, DIBASIC 15 MMOL: 224; 236 INJECTION, SOLUTION, CONCENTRATE INTRAVENOUS at 09:15

## 2022-08-30 RX ADMIN — MEMANTINE 10 MG: 10 TABLET ORAL at 21:51

## 2022-08-30 RX ADMIN — SENNOSIDES AND DOCUSATE SODIUM 2 TABLET: 8.6; 5 TABLET ORAL at 21:51

## 2022-08-30 RX ADMIN — LATANOPROST 1 DROP: 50 SOLUTION OPHTHALMIC at 21:52

## 2022-08-30 RX ADMIN — MEMANTINE 10 MG: 10 TABLET ORAL at 08:17

## 2022-08-30 RX ADMIN — LEVETIRACETAM 1000 MG: 10 INJECTION INTRAVENOUS at 00:00

## 2022-08-30 RX ADMIN — SODIUM CHLORIDE 100 ML/HR: 9 INJECTION, SOLUTION INTRAVENOUS at 05:27

## 2022-08-30 RX ADMIN — PREDNISONE 40 MG: 20 TABLET ORAL at 08:14

## 2022-08-30 RX ADMIN — LEVETIRACETAM 1000 MG: 500 TABLET, FILM COATED ORAL at 21:52

## 2022-08-30 RX ADMIN — MEROPENEM 500 MG: 500 INJECTION INTRAVENOUS at 12:29

## 2022-08-31 LAB
ALBUMIN SERPL-MCNC: 2.9 G/DL (ref 3.5–5.2)
ALBUMIN/GLOB SERPL: 1.3 G/DL
ALP SERPL-CCNC: 39 U/L (ref 39–117)
ALT SERPL W P-5'-P-CCNC: 15 U/L (ref 1–33)
ANION GAP SERPL CALCULATED.3IONS-SCNC: 7.8 MMOL/L (ref 5–15)
AST SERPL-CCNC: 12 U/L (ref 1–32)
BACTERIA SPEC AEROBE CULT: ABNORMAL
BASOPHILS # BLD AUTO: 0.01 10*3/MM3 (ref 0–0.2)
BASOPHILS NFR BLD AUTO: 0.1 % (ref 0–1.5)
BILIRUB SERPL-MCNC: 0.2 MG/DL (ref 0–1.2)
BUN SERPL-MCNC: 18 MG/DL (ref 8–23)
BUN/CREAT SERPL: 27.7 (ref 7–25)
CALCIUM SPEC-SCNC: 8.1 MG/DL (ref 8.6–10.5)
CHLORIDE SERPL-SCNC: 113 MMOL/L (ref 98–107)
CO2 SERPL-SCNC: 22.2 MMOL/L (ref 22–29)
CREAT SERPL-MCNC: 0.65 MG/DL (ref 0.57–1)
DEPRECATED RDW RBC AUTO: 47.1 FL (ref 37–54)
EGFRCR SERPLBLD CKD-EPI 2021: 89.1 ML/MIN/1.73
EOSINOPHIL # BLD AUTO: 0.11 10*3/MM3 (ref 0–0.4)
EOSINOPHIL NFR BLD AUTO: 1.4 % (ref 0.3–6.2)
ERYTHROCYTE [DISTWIDTH] IN BLOOD BY AUTOMATED COUNT: 13.8 % (ref 12.3–15.4)
GLOBULIN UR ELPH-MCNC: 2.3 GM/DL
GLUCOSE SERPL-MCNC: 94 MG/DL (ref 65–99)
HCT VFR BLD AUTO: 28.1 % (ref 34–46.6)
HGB BLD-MCNC: 9.2 G/DL (ref 12–15.9)
IMM GRANULOCYTES # BLD AUTO: 0.03 10*3/MM3 (ref 0–0.05)
IMM GRANULOCYTES NFR BLD AUTO: 0.4 % (ref 0–0.5)
LYMPHOCYTES # BLD AUTO: 2.51 10*3/MM3 (ref 0.7–3.1)
LYMPHOCYTES NFR BLD AUTO: 31.6 % (ref 19.6–45.3)
MAGNESIUM SERPL-MCNC: 1.8 MG/DL (ref 1.6–2.4)
MCH RBC QN AUTO: 30.8 PG (ref 26.6–33)
MCHC RBC AUTO-ENTMCNC: 32.7 G/DL (ref 31.5–35.7)
MCV RBC AUTO: 94 FL (ref 79–97)
MONOCYTES # BLD AUTO: 0.61 10*3/MM3 (ref 0.1–0.9)
MONOCYTES NFR BLD AUTO: 7.7 % (ref 5–12)
NEUTROPHILS NFR BLD AUTO: 4.67 10*3/MM3 (ref 1.7–7)
NEUTROPHILS NFR BLD AUTO: 58.8 % (ref 42.7–76)
NRBC BLD AUTO-RTO: 0 /100 WBC (ref 0–0.2)
PHOSPHATE SERPL-MCNC: 2.4 MG/DL (ref 2.5–4.5)
PLATELET # BLD AUTO: 183 10*3/MM3 (ref 140–450)
PMV BLD AUTO: 11.9 FL (ref 6–12)
POTASSIUM SERPL-SCNC: 3.6 MMOL/L (ref 3.5–5.2)
PROT SERPL-MCNC: 5.2 G/DL (ref 6–8.5)
RBC # BLD AUTO: 2.99 10*6/MM3 (ref 3.77–5.28)
SODIUM SERPL-SCNC: 143 MMOL/L (ref 136–145)
WBC NRBC COR # BLD: 7.94 10*3/MM3 (ref 3.4–10.8)

## 2022-08-31 PROCEDURE — 25010000002 MEROPENEM PER 100 MG: Performed by: INTERNAL MEDICINE

## 2022-08-31 PROCEDURE — 99233 SBSQ HOSP IP/OBS HIGH 50: CPT | Performed by: STUDENT IN AN ORGANIZED HEALTH CARE EDUCATION/TRAINING PROGRAM

## 2022-08-31 PROCEDURE — 97161 PT EVAL LOW COMPLEX 20 MIN: CPT

## 2022-08-31 PROCEDURE — 80053 COMPREHEN METABOLIC PANEL: CPT | Performed by: STUDENT IN AN ORGANIZED HEALTH CARE EDUCATION/TRAINING PROGRAM

## 2022-08-31 PROCEDURE — 85025 COMPLETE CBC W/AUTO DIFF WBC: CPT | Performed by: STUDENT IN AN ORGANIZED HEALTH CARE EDUCATION/TRAINING PROGRAM

## 2022-08-31 PROCEDURE — 63710000001 PREDNISONE PER 5 MG: Performed by: INTERNAL MEDICINE

## 2022-08-31 PROCEDURE — 92526 ORAL FUNCTION THERAPY: CPT

## 2022-08-31 PROCEDURE — 25010000002 ENOXAPARIN PER 10 MG: Performed by: INTERNAL MEDICINE

## 2022-08-31 PROCEDURE — 83735 ASSAY OF MAGNESIUM: CPT | Performed by: STUDENT IN AN ORGANIZED HEALTH CARE EDUCATION/TRAINING PROGRAM

## 2022-08-31 PROCEDURE — 84100 ASSAY OF PHOSPHORUS: CPT | Performed by: STUDENT IN AN ORGANIZED HEALTH CARE EDUCATION/TRAINING PROGRAM

## 2022-08-31 RX ORDER — NITROFURANTOIN 25; 75 MG/1; MG/1
100 CAPSULE ORAL EVERY 12 HOURS SCHEDULED
Status: DISCONTINUED | OUTPATIENT
Start: 2022-08-31 | End: 2022-09-01 | Stop reason: HOSPADM

## 2022-08-31 RX ORDER — FENTANYL/ROPIVACAINE/NS/PF 2-625MCG/1
15 PLASTIC BAG, INJECTION (ML) EPIDURAL ONCE
Status: COMPLETED | OUTPATIENT
Start: 2022-08-31 | End: 2022-08-31

## 2022-08-31 RX ADMIN — LEVETIRACETAM 1000 MG: 500 TABLET, FILM COATED ORAL at 20:35

## 2022-08-31 RX ADMIN — SENNOSIDES AND DOCUSATE SODIUM 2 TABLET: 8.6; 5 TABLET ORAL at 20:34

## 2022-08-31 RX ADMIN — NITROFURANTOIN MONOHYDRATE/MACROCRYSTALLINE 100 MG: 25; 75 CAPSULE ORAL at 20:34

## 2022-08-31 RX ADMIN — DONEPEZIL HYDROCHLORIDE 10 MG: 10 TABLET ORAL at 20:35

## 2022-08-31 RX ADMIN — ENOXAPARIN SODIUM 40 MG: 100 INJECTION SUBCUTANEOUS at 12:36

## 2022-08-31 RX ADMIN — LEVOTHYROXINE SODIUM 300 MCG: 0.15 TABLET ORAL at 06:17

## 2022-08-31 RX ADMIN — MEROPENEM 500 MG: 500 INJECTION INTRAVENOUS at 06:17

## 2022-08-31 RX ADMIN — MEROPENEM 500 MG: 500 INJECTION INTRAVENOUS at 00:04

## 2022-08-31 RX ADMIN — Medication 10 ML: at 20:37

## 2022-08-31 RX ADMIN — LATANOPROST 1 DROP: 50 SOLUTION OPHTHALMIC at 20:36

## 2022-08-31 RX ADMIN — NITROFURANTOIN MONOHYDRATE/MACROCRYSTALLINE 100 MG: 25; 75 CAPSULE ORAL at 12:36

## 2022-08-31 RX ADMIN — LEVETIRACETAM 750 MG: 750 TABLET, FILM COATED ORAL at 09:21

## 2022-08-31 RX ADMIN — Medication 10 ML: at 09:22

## 2022-08-31 RX ADMIN — MEMANTINE 10 MG: 10 TABLET ORAL at 09:55

## 2022-08-31 RX ADMIN — MEMANTINE 10 MG: 10 TABLET ORAL at 20:34

## 2022-08-31 RX ADMIN — POTASSIUM PHOSPHATE, MONOBASIC AND POTASSIUM PHOSPHATE, DIBASIC 15 MMOL: 224; 236 INJECTION, SOLUTION, CONCENTRATE INTRAVENOUS at 09:55

## 2022-08-31 RX ADMIN — PREDNISONE 40 MG: 20 TABLET ORAL at 09:21

## 2022-09-01 ENCOUNTER — APPOINTMENT (OUTPATIENT)
Dept: GENERAL RADIOLOGY | Facility: HOSPITAL | Age: 82
End: 2022-09-01

## 2022-09-01 VITALS
RESPIRATION RATE: 14 BRPM | SYSTOLIC BLOOD PRESSURE: 138 MMHG | WEIGHT: 165.12 LBS | OXYGEN SATURATION: 96 % | TEMPERATURE: 98.1 F | HEART RATE: 79 BPM | BODY MASS INDEX: 25.03 KG/M2 | HEIGHT: 68 IN | DIASTOLIC BLOOD PRESSURE: 57 MMHG

## 2022-09-01 LAB
ALBUMIN SERPL-MCNC: 3.2 G/DL (ref 3.5–5.2)
ALBUMIN/GLOB SERPL: 1.3 G/DL
ALP SERPL-CCNC: 40 U/L (ref 39–117)
ALT SERPL W P-5'-P-CCNC: 16 U/L (ref 1–33)
ANION GAP SERPL CALCULATED.3IONS-SCNC: 6.3 MMOL/L (ref 5–15)
AST SERPL-CCNC: 14 U/L (ref 1–32)
BASOPHILS # BLD AUTO: 0.02 10*3/MM3 (ref 0–0.2)
BASOPHILS NFR BLD AUTO: 0.2 % (ref 0–1.5)
BILIRUB SERPL-MCNC: 0.3 MG/DL (ref 0–1.2)
BUN SERPL-MCNC: 17 MG/DL (ref 8–23)
BUN/CREAT SERPL: 25.8 (ref 7–25)
CALCIUM SPEC-SCNC: 8.6 MG/DL (ref 8.6–10.5)
CHLORIDE SERPL-SCNC: 109 MMOL/L (ref 98–107)
CO2 SERPL-SCNC: 26.7 MMOL/L (ref 22–29)
CREAT SERPL-MCNC: 0.66 MG/DL (ref 0.57–1)
DEPRECATED RDW RBC AUTO: 47.4 FL (ref 37–54)
EGFRCR SERPLBLD CKD-EPI 2021: 88.8 ML/MIN/1.73
EOSINOPHIL # BLD AUTO: 0.13 10*3/MM3 (ref 0–0.4)
EOSINOPHIL NFR BLD AUTO: 1.4 % (ref 0.3–6.2)
ERYTHROCYTE [DISTWIDTH] IN BLOOD BY AUTOMATED COUNT: 13.8 % (ref 12.3–15.4)
GLOBULIN UR ELPH-MCNC: 2.4 GM/DL
GLUCOSE BLDC GLUCOMTR-MCNC: 102 MG/DL (ref 70–99)
GLUCOSE SERPL-MCNC: 82 MG/DL (ref 65–99)
HCT VFR BLD AUTO: 29.7 % (ref 34–46.6)
HGB BLD-MCNC: 9.6 G/DL (ref 12–15.9)
IMM GRANULOCYTES # BLD AUTO: 0.03 10*3/MM3 (ref 0–0.05)
IMM GRANULOCYTES NFR BLD AUTO: 0.3 % (ref 0–0.5)
LYMPHOCYTES # BLD AUTO: 3.12 10*3/MM3 (ref 0.7–3.1)
LYMPHOCYTES NFR BLD AUTO: 34.1 % (ref 19.6–45.3)
MAGNESIUM SERPL-MCNC: 1.9 MG/DL (ref 1.6–2.4)
MCH RBC QN AUTO: 30.2 PG (ref 26.6–33)
MCHC RBC AUTO-ENTMCNC: 32.3 G/DL (ref 31.5–35.7)
MCV RBC AUTO: 93.4 FL (ref 79–97)
MONOCYTES # BLD AUTO: 0.7 10*3/MM3 (ref 0.1–0.9)
MONOCYTES NFR BLD AUTO: 7.7 % (ref 5–12)
NEUTROPHILS NFR BLD AUTO: 5.15 10*3/MM3 (ref 1.7–7)
NEUTROPHILS NFR BLD AUTO: 56.3 % (ref 42.7–76)
NRBC BLD AUTO-RTO: 0 /100 WBC (ref 0–0.2)
PHOSPHATE SERPL-MCNC: 2.5 MG/DL (ref 2.5–4.5)
PLATELET # BLD AUTO: 180 10*3/MM3 (ref 140–450)
PMV BLD AUTO: 12.3 FL (ref 6–12)
POTASSIUM SERPL-SCNC: 3.5 MMOL/L (ref 3.5–5.2)
PROT SERPL-MCNC: 5.6 G/DL (ref 6–8.5)
RBC # BLD AUTO: 3.18 10*6/MM3 (ref 3.77–5.28)
SARS-COV-2 RNA PNL SPEC NAA+PROBE: NOT DETECTED
SODIUM SERPL-SCNC: 142 MMOL/L (ref 136–145)
WBC NRBC COR # BLD: 9.15 10*3/MM3 (ref 3.4–10.8)

## 2022-09-01 PROCEDURE — 99239 HOSP IP/OBS DSCHRG MGMT >30: CPT | Performed by: STUDENT IN AN ORGANIZED HEALTH CARE EDUCATION/TRAINING PROGRAM

## 2022-09-01 PROCEDURE — 25010000002 ENOXAPARIN PER 10 MG: Performed by: INTERNAL MEDICINE

## 2022-09-01 PROCEDURE — 92526 ORAL FUNCTION THERAPY: CPT

## 2022-09-01 PROCEDURE — 85025 COMPLETE CBC W/AUTO DIFF WBC: CPT | Performed by: STUDENT IN AN ORGANIZED HEALTH CARE EDUCATION/TRAINING PROGRAM

## 2022-09-01 PROCEDURE — 82962 GLUCOSE BLOOD TEST: CPT

## 2022-09-01 PROCEDURE — U0004 COV-19 TEST NON-CDC HGH THRU: HCPCS | Performed by: PHYSICIAN ASSISTANT

## 2022-09-01 PROCEDURE — 83735 ASSAY OF MAGNESIUM: CPT | Performed by: STUDENT IN AN ORGANIZED HEALTH CARE EDUCATION/TRAINING PROGRAM

## 2022-09-01 PROCEDURE — 94640 AIRWAY INHALATION TREATMENT: CPT

## 2022-09-01 PROCEDURE — 71045 X-RAY EXAM CHEST 1 VIEW: CPT

## 2022-09-01 PROCEDURE — 80053 COMPREHEN METABOLIC PANEL: CPT | Performed by: STUDENT IN AN ORGANIZED HEALTH CARE EDUCATION/TRAINING PROGRAM

## 2022-09-01 PROCEDURE — 63710000001 PREDNISONE PER 5 MG: Performed by: INTERNAL MEDICINE

## 2022-09-01 PROCEDURE — 84100 ASSAY OF PHOSPHORUS: CPT | Performed by: STUDENT IN AN ORGANIZED HEALTH CARE EDUCATION/TRAINING PROGRAM

## 2022-09-01 PROCEDURE — 94799 UNLISTED PULMONARY SVC/PX: CPT

## 2022-09-01 RX ORDER — NITROFURANTOIN 25; 75 MG/1; MG/1
100 CAPSULE ORAL EVERY 12 HOURS SCHEDULED
Qty: 10 CAPSULE | Refills: 0 | Status: SHIPPED | OUTPATIENT
Start: 2022-09-01 | End: 2022-09-06

## 2022-09-01 RX ORDER — LEVOTHYROXINE SODIUM 0.15 MG/1
150 TABLET ORAL DAILY
Qty: 30 TABLET | Refills: 0 | Status: SHIPPED | OUTPATIENT
Start: 2022-09-01 | End: 2022-10-01

## 2022-09-01 RX ORDER — AMLODIPINE BESYLATE 5 MG/1
2.5 TABLET ORAL DAILY
Start: 2022-09-01

## 2022-09-01 RX ORDER — GUAIFENESIN 600 MG/1
1200 TABLET, EXTENDED RELEASE ORAL EVERY 12 HOURS SCHEDULED
Status: DISCONTINUED | OUTPATIENT
Start: 2022-09-01 | End: 2022-09-01 | Stop reason: HOSPADM

## 2022-09-01 RX ADMIN — GUAIFENESIN 1200 MG: 600 TABLET ORAL at 08:20

## 2022-09-01 RX ADMIN — PREDNISONE 40 MG: 20 TABLET ORAL at 10:49

## 2022-09-01 RX ADMIN — Medication 10 ML: at 10:50

## 2022-09-01 RX ADMIN — LEVOTHYROXINE SODIUM 300 MCG: 0.15 TABLET ORAL at 08:17

## 2022-09-01 RX ADMIN — LEVETIRACETAM 750 MG: 750 TABLET, FILM COATED ORAL at 10:49

## 2022-09-01 RX ADMIN — ENOXAPARIN SODIUM 40 MG: 100 INJECTION SUBCUTANEOUS at 12:59

## 2022-09-01 RX ADMIN — ALBUTEROL SULFATE 2.5 MG: 2.5 SOLUTION RESPIRATORY (INHALATION) at 08:24

## 2022-09-01 RX ADMIN — MEMANTINE 10 MG: 10 TABLET ORAL at 10:49

## 2022-09-01 RX ADMIN — NITROFURANTOIN MONOHYDRATE/MACROCRYSTALLINE 100 MG: 25; 75 CAPSULE ORAL at 10:49

## 2022-09-01 NOTE — DISCHARGE SUMMARY
[facility]         HOSPITALIST  DISCHARGE SUMMARY    Patient Name: Kezia Garcia  : 1940  MRN: [id number]    Date of Admission: 2022  Date of Discharge: 2022  Primary Care Physician: Laurent Blanco MD    Consults     Date and Time Order Name Status Description    2022  8:19 AM Inpatient Hospitalist Consult            Active and Resolved Hospital Problems:  Active Hospital Problems    Diagnosis POA   • Sepsis (HCC) [A41.9] Yes      Resolved Hospital Problems   No resolved problems to display.       Hospital Course     Hospital Course:  Kezia Garcia is a 82 y.o. female who presented from nursing home with chief complaint of falling out of bed at nursing home and altered mental status.  On his presentation patient had imaging that showed no acute intracranial abnormality.  She did have a left frontal scalp contusion.  She was started on empiric antibiotic therapy for urinary tract infection with Rocephin and cultures were obtained.  Patient's course was complicated by her dementia with behavioral disturbances and she required restraints as she was trying to get up out of bed frequently.  Ultimately restraints were discontinued and patient had a safety sitter at bedside.  Patient was doing well prior to discharge and her mood was appropriate.  She was eating and drinking without difficulty.  Patient had urine culture that ultimately showed no growth, however given her urinalysis and improvement in symptoms with antibiotic she will continue a total of 7 days therapy at discharge.  Patient will be discharged back to the nursing home as before.  She will follow-up with her PCP in 1 week.  She will continue her other home medications as before.    Discharge problem list:  Acute Escherichia coli cystitis  Status post fall  Sepsis secondary to acute cystitis with findings of hypothermia and leukocytosis  COPD, not exacerbated  Advanced dementia  Seizure  disorder    DISCHARGE Follow Up Recommendations for labs and diagnostics: PCP 1 week.      Day of Discharge     Vital Signs:  Temp:  [97.3 °F (36.3 °C)-98.2 °F (36.8 °C)] 97.7 °F (36.5 °C)  Heart Rate:  [49-62] 49  Resp:  [12-18] 12  BP: (103-154)/(40-62) 117/58  Physical Exam:   Constitutional: Alert, awake, no acute distress  HEENT:  PERRLA, EOMI; No Scleral icterus  Neck:  Supple; No Mass, No Lymphadenopathy  Cardiovascular:  No Rubs, No Edema, No JVD  Respiratory: No respiratory distress, unlabored breathing, saturating well on room air  Abdomen:  Normal BS all 4 Quadrants; No Guarding, No Tenderness  Musculoskeletal:  Pulses Positive all 4 Ext; No Cyanosis, No Edema  Neurological:  Alert+Ox3, Mental status WNL; No Dysarthria  Skin:  No Rash, No Cellulitis, No Erythema      Discharge Details        Discharge Medications      New Medications      Instructions Start Date   nitrofurantoin (macrocrystal-monohydrate) 100 MG capsule  Commonly known as: MACROBID   100 mg, Oral, Every 12 Hours Scheduled         Changes to Medications      Instructions Start Date   amLODIPine 5 MG tablet  Commonly known as: NORVASC  What changed: additional instructions   2.5 mg, Oral, Daily, Hold for systolic blood pressure less than or equal to 110 mmHg.      levETIRAcetam 750 MG tablet  Commonly known as: KEPPRA  What changed: Another medication with the same name was changed. Make sure you understand how and when to take each.   750 mg, Oral, Daily      levETIRAcetam 500 MG tablet  Commonly known as: KEPPRA  What changed: when to take this   1,000 mg, Oral, 2 Times Daily      levothyroxine 150 MCG tablet  Commonly known as: Synthroid  What changed:   medication strength  how much to take   150 mcg, Oral, Daily         Continue These Medications      Instructions Start Date   acetaminophen 500 MG tablet  Commonly known as: TYLENOL   500 mg, Oral, Every 6 Hours PRN      albuterol (2.5 MG/3ML) 0.083% nebulizer solution  Commonly known  as: PROVENTIL   2.5 mg, Nebulization, Every 4 Hours PRN      donepezil 10 MG tablet  Commonly known as: ARICEPT   10 mg, Oral, Nightly      fenofibrate 160 MG tablet   160 mg, Oral, Daily      Flax Seed Oil 1000 MG capsule   1,000 mg, Oral, Daily      furosemide 20 MG tablet  Commonly known as: LASIX   20 mg, Oral, Daily      guaiFENesin 100 MG/5ML solution oral solution  Commonly known as: ROBITUSSIN   600 mg, Oral, 2 Times Daily PRN      latanoprost 0.005 % ophthalmic solution  Commonly known as: XALATAN   1 drop, Both Eyes, Nightly      Lidocaine HCl 4 % cream  Commonly known as: LMX   1 application, Topical, 2 Times Daily, Apply to lower back      loperamide 2 MG capsule  Commonly known as: IMODIUM   2 mg, Oral, 4 Times Daily PRN      memantine 10 MG tablet  Commonly known as: NAMENDA   10 mg, Oral, 2 Times Daily      potassium chloride 8 MEQ CR capsule  Commonly known as: MICRO-K   8 mEq, Oral, Daily      predniSONE 20 MG tablet  Commonly known as: DELTASONE   40 mg, Oral, Daily             Allergies   Allergen Reactions   • Erythromycin Anaphylaxis   • Bacitracin Unknown - High Severity   • Cefazolin Unknown - High Severity   • Cephalosporins Unknown - High Severity   • Gramicidin Unknown - High Severity   • Neomycin Unknown - High Severity   • Penicillins Unknown - High Severity   • Polymox [Amoxicillin] Unknown - High Severity   • Quinolones Unknown - High Severity   • Statins Unknown - High Severity   • Sulfa Antibiotics Unknown - High Severity   • Trimethoprim Unknown - High Severity       Discharge Disposition:  Rehab Facility or Unit (DC - External)    Diet:  Hospital:  Diet Order   Procedures   • Diet Pureed; Honey Thick; Cardiac       Discharge Activity:       CODE STATUS:  Code Status and Medical Interventions:   Ordered at: 08/29/22 1649     Code Status (Patient has no pulse and is not breathing):    CPR (Attempt to Resuscitate)     Medical Interventions (Patient has pulse or is breathing):    Full  Support     Release to patient:    Routine Release         No future appointments.        Pertinent  and/or Most Recent Results     PROCEDURES:   XR Hand 2 View Left    Result Date: 8/29/2022  Narrative: PROCEDURE: XR HAND 2 VW LEFT  COMPARISON: None  INDICATIONS: LEFT HAND PAIN AFTER FALL INJURY  FINDINGS:  The bones are demineralized.  There is mild osteoarthritis.  No acute fracture, dislocation, erosion or effusion.  Soft tissues are unremarkable.      Impression:   1. No acute osseous abnormality. 2. Osteopenia and osteoarthritis.      VARGAS LABOY MD       Electronically Signed and Approved By: VARGAS LABOY MD on 8/29/2022 at 7:23             XR Hand 3+ View Right    Result Date: 8/29/2022  Narrative: PROCEDURE: XR HAND 3+ VW RIGHT  COMPARISON: [facility], CR, HAND >OR= 3V RT, 12/23/2020, 4:03.  INDICATIONS: RIGHT HAND PAIN AFTER FALL INJURY TODAY  FINDINGS:  The bones are demineralized.  There is mild osteoarthritis.  No acute fracture, dislocation, erosion or focal soft tissue abnormality.      Impression:   1. No acute osseous abnormality. 2. Osteopenia and osteoarthritis.      VARGAS LABOY MD       Electronically Signed and Approved By: VARGAS LABOY MD on 8/29/2022 at 7:23             CT Head Without Contrast    Result Date: 8/29/2022  Narrative: PROCEDURE: CT HEAD WO CONTRAST  COMPARISONS: There are multiple prior [facility] head CT exam comparisons, dating between 8/10/2022 and 2/25/2011.  INDICATIONS: CONFUSION AFTER FALL TODAY.  PROTOCOL:   Standard CT imaging protocol performed.    RADIATION:   Total DLP: 1,433.2 mGy*cm   MA and/or KV were/was adjusted to minimize radiation dose.    TECHNIQUE: After obtaining the patient's consent, 117 CT images were obtained without non-ionic intravenous contrast material.  Helical technique was used.  FINDINGS: No definite acute intracranial hemorrhage is seen.  No definite acute infarct.  No definite acute brain abnormality is identified.  No  definite acute skull fracture.  There is a new acute left parietal scalp contusion, such as seen on image 35 of series 201 and adjacent images, roughly estimated at about 5.4 x 1.1 cm on image 35 of series 201.  No associated acute skull fracture is appreciated with this finding.  There has been interval decrease in size of the right frontal scalp contusion with laceration since 8/10/2022.  Less subcutaneous/subgaleal gas is associated with this finding.  Again, extensive postoperative changes are seen with bilateral craniotomies and bilateral aneurysm clips.  A ventriculoperitoneal shunt is in place on the right, unchanged in position as described previously.  Its proximal limb enters from a right parietal approach with its distal portion crossing midline and its tip residing in the left frontal lobe, seen previously.  Extensive encephalomalacia is seen bilaterally, greater on the right than the left.  Bilateral chronic subdural hematomas and/or subdural hygromas are seen.      Impression:  There is a new large left parietal scalp contusion.  There has been interval decrease in the right frontal scalp contusion.  No new acute intracranial hemorrhage.  No acute skull fracture.  No acute infarct.  There are suspected bilateral cerebral convexity chronic subdural hematomas and/or hygromas.  Otherwise, no significant interval change is seen since the 8/10/2022 study.  Please see that prior CT exam report for further detail regarding additional chronic findings.     COMMENT:  Part of this note is an electronic transcription of spoken language to printed text. The electronic translation/transcription may permit erroneous, or at times, nonsensical (or even sensical) words or phrases to be inadvertently transcribed or omitted; this  has reviewed the note for such errors (as well as additional errors); however, some may still exist.  CATHY THAPA JR, MD       Electronically Signed and Approved By: CATHY PAL  ALANIS BETHEA MD on 8/29/2022 at 5:56              CT Head Without Contrast    Result Date: 8/10/2022  Narrative: PROCEDURE: CT HEAD WO CONTRAST  COMPARISONS: 4/3/2021, 12/25/2021, and 5/19/2022.   INDICATIONS: HEAD TRAUMA; ABNORMAL MENTAL STATUS; LACERATION RIGHT TEMPLE.  PROTOCOL:   Standard CT imaging protocol performed.    RADIATION:   Total DLP: 1,018.2 mGy*cm.   MA and/or KV were/was adjusted to minimize radiation dose.    TECHNIQUE: After obtaining the patient's consent, 130 CT images were obtained without non-ionic intravenous contrast material.  FINDINGS: There is an acute right frontal scalp contusion with a hemorrhagic subgaleal component and subcutaneous and subgaleal gas foci, suggesting laceration(s), such as seen on image 48 of series 201 and adjacent images.  This finding is roughly estimated at 5.5 x 0.9 cm.  No acute intracranial hemorrhage is seen.  No acute skull fracture.  No definite acute infarct.  There is a ventriculoperitoneal () shunt in place.  The proximal limb ventricular catheter is in place from a right parietal approach.  Its distal portion crosses the midline and its distal tip resides within the left frontal lobe, as seen on image 39 of series 201 and series 202.  It is thought to be in a similar location to prior studies.  Extensive encephalomalacia involves the right cerebral hemisphere, especially the right temporal lobe and possibly extending into portions of the right frontal and right parietal lobes.  To a lesser extent, encephalomalacia is seen contralaterally, especially in the opercular left frontal lobe.  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  Bilateral pterional craniotomies are noted, as before.  There are prominent extra-axial spaces bilaterally, which may represent chronic subdural hematomas or subdural hygromas.  The subarachnoid spaces are also prominent, suggesting  central atrophy.  No change in the size or configuration of the ventricular system.  No significant change in the extra-axial spaces.  There may be mild mucosal thickening within the imaged paranasal sinuses.  No air-fluid interfaces are seen within the imaged paranasal sinuses.  A canal-wall-up right mastoidectomy is noted, seen previously.  Otherwise, the bilateral middle ear clefts are thought to be well aerated.  Mild degenerative changes involve the temporomandibular joints.  There are degenerative changes of the partially imaged atlantoaxial joint.  The patient has undergone bilateral cataract extractions with intra-ocular lens implants.  No acute orbital findings are seen.  There are arterial calcifications.      Impression:  There is a large acute right frontal scalp contusion with laceration.  No acute skull fracture.  No acute intracranial hemorrhage.  Otherwise, no significant interval change is seen since the prior CT exams with findings as detailed above.     COMMENT:  Part of this note is an electronic transcription of spoken language to printed text. The electronic translation/transcription may permit erroneous, or at times, nonsensical (or even sensical) words or phrases to be inadvertently transcribed or omitted; this  has reviewed the note for such errors (as well as additional errors); however, some may still exist.  CATHY THAPA JR, MD       Electronically Signed and Approved By: CATHY THAPA JR, MD on 8/10/2022 at 6:34              CT Cervical Spine Without Contrast    Result Date: 8/29/2022  Narrative: PROCEDURE: CT CERVICAL SPINE WO CONTRAST  COMPARISON: 3/2/2021.  INDICATIONS: NECK PAIN POST FALL TODAY.  PROTOCOL:   Standard CT imaging protocol performed.    RADIATION:   Total DLP: 1,433.2 mGy*cm   MA and/or KV were/was adjusted to minimize radiation dose.    TECHNIQUE: After obtaining the patient's consent, multi-planar CT images were created without contrast material.  There  is slight motion artifact on the study.  EXAM FINDINGS: A routine nonenhanced cervical spine CT was performed.  Sagittal and coronal two-dimensional reformations are provided for review.  No acute cervical spine fracture or acute malalignment is identified.  Small nonspecific bilateral cervical lymph nodes are seen.  There is lateral curvature of the thoracic spine with convexity to the left, which may be fixed or positional; this finding is partially imaged on the study.  Moderate-to-severe degenerative changes are seen throughout the cervical spine.  External artifacts in the scan field of view obscure detail.  There is a right-sided ventriculoperitoneal () shunt in place, which is partially imaged on the study.  Fibrotic and emphysematous changes may involve the lung apices.  Age-indeterminate nodular infiltrates are seen within the right pulmonary apex.  There are similar findings (to a lesser extent) contralaterally.  Pneumonia cannot be excluded.  These findings are thought to be new since the 3/2/2021 study.  Degenerative changes involve the bilateral temporomandibular joints.  There is mild diffuse prominence of the thyroid gland, seen previously.  Please see the head CT exam reports from the same day as well as 8/10/2022 for further detail regarding additional findings, partially imaged on the study.      Impression:   No acute cervical spine fracture is seen.  No acute cervical spine subluxation.  There are possible new acute infiltrates in the bilateral (right greater than left) upper lung zones.    COMMENT:  Part of this note is an electronic transcription of spoken language to printed text. The electronic translation/transcription may permit erroneous, or at times, nonsensical (or even sensical) words or phrases to be inadvertently transcribed or omitted; this  has reviewed the note for such errors (as well as additional errors); however, some may still exist.  CATHY THAPA JR, MD        Electronically Signed and Approved By: CATHY THAPA JR, MD on 8/29/2022 at 6:03         XR Chest 1 View    Result Date: 9/1/2022  Narrative: PROCEDURE: XR CHEST 1 VW  COMPARISON: [facility], , XR CHEST 1 VW, 8/29/2022, 7:00.  INDICATIONS: productive cough  FINDINGS:  The lung volumes are diminished.  The heart is slightly enlarged.  The pulmonary vascular markings are slightly increased raising question of mild vascular congestion.  The patient has an implanted electronic cardiac device in place projecting over the left lower chest wall.  There is no pleural effusion or pneumothorax.      Impression:   1. Low lung volumes. 2. Cardiomegaly with questionable mild pulmonary vascular congestion.       KAVON COX MD       Electronically Signed and Approved By: KAVON COX MD on 9/01/2022 at 8:40             XR Chest 1 View    Result Date: 8/29/2022  Narrative: PROCEDURE: XR CHEST 1 VW  COMPARISON: [facility], , XR CHEST 1 VW, 5/30/2022, 19:31.  INDICATIONS: COUGH AFTER FALL  FINDINGS:   shunt catheter tubing traverses the right chest.  Electronic device overlies the left upper quadrant of the abdomen.  There are old healed fractures of the right posterior 6th, 7th and 8th ribs.  No pneumothorax, pleural effusion or focal airspace consolidation.  No acute osseous abnormalities.      Impression:  No acute cardiopulmonary abnormality.       VARGAS LABOY MD       Electronically Signed and Approved By: VARGAS LABOY MD on 8/29/2022 at 7:22                 LAB RESULTS:      Lab 09/01/22  0632 08/31/22  0514 08/30/22  0443 08/29/22  1449 08/29/22  0905 08/29/22  0720   WBC 9.15 7.94 8.52  --   --  20.78*   HEMOGLOBIN 9.6* 9.2* 10.4*  --   --  11.7*   HEMATOCRIT 29.7* 28.1* 31.9*  --   --  35.4   PLATELETS 180 183 195  --   --  266   NEUTROS ABS 5.15 4.67  --   --   --  16.43*   IMMATURE GRANS (ABS) 0.03 0.03  --   --   --  0.13*   LYMPHS ABS 3.12* 2.51  --   --   --  2.89   MONOS ABS 0.70  0.61  --   --   --  1.22*   EOS ABS 0.13 0.11  --   --   --  0.08   MCV 93.4 94.0 93.3  --   --  91.7   LACTATE  --   --   --  1.3 1.4  --          Lab 09/01/22  0632 08/31/22  0514 08/30/22  0443 08/29/22  0720   SODIUM 142 143 140 142   POTASSIUM 3.5 3.6 3.5 3.7   CHLORIDE 109* 113* 107 105   CO2 26.7 22.2 22.3 27.9   ANION GAP 6.3 7.8 10.7 9.1   BUN 17 18 25* 34*   CREATININE 0.66 0.65 0.75 0.98   EGFR 88.8 89.1 80.6 58.5*   GLUCOSE 82 94 91 101*   CALCIUM 8.6 8.1* 8.3* 8.9   MAGNESIUM 1.9 1.8 1.8 2.0   PHOSPHORUS 2.5 2.4* 2.4*  --    TSH  --   --   --  0.067*         Lab 09/01/22  0632 08/31/22  0514 08/29/22  0720   TOTAL PROTEIN 5.6* 5.2* 6.6   ALBUMIN 3.20* 2.90* 3.70   GLOBULIN 2.4 2.3 2.9   ALT (SGPT) 16 15 22   AST (SGOT) 14 12 24   BILIRUBIN 0.3 0.2 0.3   ALK PHOS 40 39 42                     Brief Urine Lab Results  (Last result in the past 365 days)      Color   Clarity   Blood   Leuk Est   Nitrite   Protein   CREAT   Urine HCG        08/29/22 0742 Yellow   Cloudy   Negative   Moderate (2+)   Positive   Negative               Microbiology Results (last 10 days)     Procedure Component Value - Date/Time    MRSA Screen, PCR (Inpatient) - Swab, Nares [[id number]]  (Normal) Collected: 08/29/22 1713    Lab Status: Final result Specimen: Swab from Nares Updated: 08/29/22 1827     MRSA PCR No MRSA Detected    Narrative:      The negative predictive value of this diagnostic test is high and should only be used to consider de-escalating anti-MRSA therapy. A positive result may indicate colonization with MRSA and must be correlated clinically.    Blood Culture - Blood, Arm, Right [[id number]]  (Normal) Collected: 08/29/22 1449    Lab Status: Preliminary result Specimen: Blood from Arm, Right Updated: 08/31/22 1501     Blood Culture No growth at 2 days    Blood Culture - Blood, Arm, Right [[id number]]  (Normal) Collected: 08/29/22 1449    Lab Status: Preliminary result Specimen: Blood from Arm, Right Updated:  08/31/22 1501     Blood Culture No growth at 2 days    Urine Culture - Urine, Urine, Clean Catch [[id number]]  (Abnormal)  (Susceptibility) Collected: 08/29/22 0742    Lab Status: Final result Specimen: Urine, Clean Catch Updated: 08/31/22 1115     Urine Culture >100,000 CFU/mL Escherichia coli    Narrative:      Colonization of the urinary tract without infection is common. Treatment is discouraged unless the patient is symptomatic, pregnant, or undergoing an invasive urologic procedure.    Susceptibility      Escherichia coli      BASSEM      Ampicillin Susceptible      Ampicillin + Sulbactam Susceptible      Cefazolin Susceptible      Cefepime Susceptible      Ceftazidime Susceptible      Ceftriaxone Susceptible      Gentamicin Susceptible      Levofloxacin Susceptible      Nitrofurantoin Susceptible      Piperacillin + Tazobactam Susceptible      Trimethoprim + Sulfamethoxazole Susceptible                                 XR Hand 2 View Left    Result Date: 8/29/2022  Impression:   1. No acute osseous abnormality. 2. Osteopenia and osteoarthritis.      VARGAS LABOY MD       Electronically Signed and Approved By: VARGAS LABOY MD on 8/29/2022 at 7:23             XR Hand 3+ View Right    Result Date: 8/29/2022  Impression:   1. No acute osseous abnormality. 2. Osteopenia and osteoarthritis.      VARGAS LABOY MD       Electronically Signed and Approved By: VARGAS LABOY MD on 8/29/2022 at 7:23             CT Head Without Contrast    Result Date: 8/29/2022  Impression:  There is a new large left parietal scalp contusion.  There has been interval decrease in the right frontal scalp contusion.  No new acute intracranial hemorrhage.  No acute skull fracture.  No acute infarct.  There are suspected bilateral cerebral convexity chronic subdural hematomas and/or hygromas.  Otherwise, no significant interval change is seen since the 8/10/2022 study.  Please see that prior CT exam report for further detail regarding  additional chronic findings.     COMMENT:  Part of this note is an electronic transcription of spoken language to printed text. The electronic translation/transcription may permit erroneous, or at times, nonsensical (or even sensical) words or phrases to be inadvertently transcribed or omitted; this  has reviewed the note for such errors (as well as additional errors); however, some may still exist.  CATHY THAPA JR, MD       Electronically Signed and Approved By: CATHY THAPA JR, MD on 8/29/2022 at 5:56              CT Head Without Contrast    Result Date: 8/10/2022  Impression:  There is a large acute right frontal scalp contusion with laceration.  No acute skull fracture.  No acute intracranial hemorrhage.  Otherwise, no significant interval change is seen since the prior CT exams with findings as detailed above.     COMMENT:  Part of this note is an electronic transcription of spoken language to printed text. The electronic translation/transcription may permit erroneous, or at times, nonsensical (or even sensical) words or phrases to be inadvertently transcribed or omitted; this  has reviewed the note for such errors (as well as additional errors); however, some may still exist.  CATHY THAPA JR, MD       Electronically Signed and Approved By: CATHY THAPA JR, MD on 8/10/2022 at 6:34              CT Cervical Spine Without Contrast    Result Date: 8/29/2022  Impression:   No acute cervical spine fracture is seen.  No acute cervical spine subluxation.  There are possible new acute infiltrates in the bilateral (right greater than left) upper lung zones.    COMMENT:  Part of this note is an electronic transcription of spoken language to printed text. The electronic translation/transcription may permit erroneous, or at times, nonsensical (or even sensical) words or phrases to be inadvertently transcribed or omitted; this  has reviewed the note for such errors (as well as  additional errors); however, some may still exist.  CATHY THAPA JR, MD       Electronically Signed and Approved By: CATHY THAPA JR, MD on 8/29/2022 at 6:03         XR Chest 1 View    Result Date: 9/1/2022  Impression:   1. Low lung volumes. 2. Cardiomegaly with questionable mild pulmonary vascular congestion.       KAVON COX MD       Electronically Signed and Approved By: KAVON COX MD on 9/01/2022 at 8:40             XR Chest 1 View    Result Date: 8/29/2022  Impression:  No acute cardiopulmonary abnormality.       VARGAS LABOY MD       Electronically Signed and Approved By: VARGAS LABOY MD on 8/29/2022 at 7:22                           Labs Pending at Discharge:  Pending Labs     Order Current Status    COVID-19,APTIMA PANTHER(USHA),[facility] SANDRA/[facility] RADHIKA, NP/OP SWAB IN UTM/VTM/SALINE TRANSPORT MEDIA,24 HR TAT - Swab, Nasopharynx In process    Blood Culture - Blood, Arm, Right Preliminary result    Blood Culture - Blood, Arm, Right Preliminary result            Time spent on Discharge including face to face service:  32 minutes    Electronically signed by Amos Ortega DO, 09/01/22, 9:28 AM EDT.

## 2022-09-01 NOTE — PLAN OF CARE
Goal Outcome Evaluation:    Problem: Fall Injury Risk  Goal: Absence of Fall and Fall-Related Injury  Outcome: Ongoing, Progressing  Intervention: Identify and Manage Contributors  Recent Flowsheet Documentation  Taken 9/1/2022 0822 by Ashleigh Montes RN  Medication Review/Management:   high-risk medications identified   medications reviewed  Self-Care Promotion: BADL personal objects within reach  Taken 9/1/2022 0745 by Ashleigh Montes RN  Medication Review/Management:   high-risk medications identified   medications reviewed  Intervention: Promote Injury-Free Environment  Recent Flowsheet Documentation  Taken 9/1/2022 1136 by Ashleigh Montes RN  Safety Promotion/Fall Prevention:   safety round/check completed   fall prevention program maintained  Taken 9/1/2022 0950 by Ashleigh Montes RN  Safety Promotion/Fall Prevention:   safety round/check completed   fall prevention program maintained  Taken 9/1/2022 0840 by Ashleigh Montes RN  Safety Promotion/Fall Prevention:   safety round/check completed   fall prevention program maintained  Taken 9/1/2022 0822 by Ashleigh Montes RN  Safety Promotion/Fall Prevention:   safety round/check completed   fall prevention program maintained  Taken 9/1/2022 0745 by Ashleigh Montes RN  Safety Promotion/Fall Prevention:   safety round/check completed   fall prevention program maintained

## 2022-09-01 NOTE — THERAPY TREATMENT NOTE
Acute Care - Speech Language Pathology   Swallow Treatment Note  Marc     Patient Name: Kezia Garcia  : 1940  MRN: [id number]  Today's Date: 2022               Admit Date: 2022    Visit Dx:     ICD-10-CM ICD-9-CM   1. Sepsis, due to unspecified organism, unspecified whether acute organ dysfunction present ([facility])  A41.9 038.9     995.91   2. Acute UTI  N39.0 599.0   3. Oropharyngeal dysphagia  R13.12 787.22   4. Difficulty walking  R26.2 719.7     Patient Active Problem List   Diagnosis   • Seizure disorder (HCC)   • COPD (chronic obstructive pulmonary disease) ([facility])   • Hypothyroidism   • Dementia (HCC)   • Difficulty with speech   • Hypertension   • History of CVA (cerebrovascular accident)   • Hypophosphatemia   • Pneumonia due to infectious organism, unspecified laterality, unspecified part of lung   • Sepsis ([facility])     Past Medical History:   Diagnosis Date   • Anemia    • COPD (chronic obstructive pulmonary disease) ([facility])    • Coronary artery disease    • Dementia ([facility])    • Disease of thyroid gland    • Hyperkalemia    • Hyperlipidemia    • Hypertension    • Seizures ([facility])      History reviewed. No pertinent surgical history.      SPEECH PATHOLOGY DYSPHAGIA TREATMENT     Subjective/Behavioral Observations: Alert and cooperative, nonverbal        Day/time of Treatment: 2022        Current Diet: Puréed, honey thick liquid        Current Strategies:One-on-one assist to feed patient, alternate small bites and small sips of puréed solids and honey thickened liquid by spoon.  Check for swallow each bite/sip.        Treatment received: Dysphagia therapy to address swallow function through exercises and education of strategies.        Results of treatment: Nursing reports patient with increased congestion.  A.m. meal with occasional laryngeal congestion noted, patient requiring mod max cue to clear.  P.o. intake less than 25% of a.m. meal.  Patient with lingual pumping prior to swallow  completed.          Progress toward goals:  Good progress noted.  Minimal signs of possible aspiration noted.        Barriers to Achieving goals: Medical status        Plan of care:/changes in plan: Continue per current plan.  Monitor for signs or symptoms of aspiration.                                                                                              Time Calculation:    Time Calculation- SLP     Row Name 09/01/22 1027             Time Calculation- SLP    SLP Stop Time 0845  -TB      SLP Received On 09/01/22  -TB              Untimed Charges    93321-OB Treatment Swallow Minutes 40  -TB              Total Minutes    Untimed Charges Total Minutes 40  -TB       Total Minutes 40  -TB            User Key  (r) = Recorded By, (t) = Taken By, (c) = Cosigned By    Initials Name Provider Type    Myrna Russell SLP Speech and Language Pathologist                Therapy Charges for Today     Code Description Service Date Service Provider Modifiers Qty    41119980259 HC ST TREATMENT SWALLOW 3 8/31/2022 Myrna Hui SLP GN 1    [id number] HC ST TREATMENT SWALLOW 3 9/1/2022 Myrna Hui SLP GN 1               LAILA Rosas  9/1/2022

## 2022-09-01 NOTE — NURSING NOTE
[facility] at [facility]. RR: 18 at this time. VSS from last vital sign check.   Report given to EMS.   Monte unable to be transported with EMS at this time. Called daughter and notified. pts daughter stated to give to another pt if need be.

## 2022-09-01 NOTE — PLAN OF CARE
Goal Outcome Evaluation:  Plan of Care Reviewed With: patient, daughter patient has congested cough with some wheezing and rhonci noted  pt able to cough up moderate amount of clear thick sputum  , meds crushed and given in small amount of applesauce  renae cath in place . Md notified of diarrhea  and wheezing/congestion , pt swabbed for covid nebs and chest xray ordered.

## 2022-09-01 NOTE — PLAN OF CARE
Goal Outcome Evaluation:    Patient awaiting for EMS transport at this time. Pt is going back to [facility] at this time. Hand off report given to CESAR Dai at rehab. Called and updated Janessa and stated okay for pt to be transported back to [facility].

## 2022-09-03 LAB
BACTERIA SPEC AEROBE CULT: NORMAL
BACTERIA SPEC AEROBE CULT: NORMAL

## 2022-09-30 ENCOUNTER — HOSPITAL ENCOUNTER (EMERGENCY)
Facility: HOSPITAL | Age: 82
Discharge: NURSING, REHAB OR LONG-TERM CARE | End: 2022-09-30
Attending: EMERGENCY MEDICINE | Admitting: EMERGENCY MEDICINE

## 2022-09-30 ENCOUNTER — APPOINTMENT (OUTPATIENT)
Dept: CT IMAGING | Facility: HOSPITAL | Age: 82
End: 2022-09-30

## 2022-09-30 VITALS
BODY MASS INDEX: 25.03 KG/M2 | DIASTOLIC BLOOD PRESSURE: 74 MMHG | WEIGHT: 165.12 LBS | HEART RATE: 73 BPM | HEIGHT: 68 IN | OXYGEN SATURATION: 98 % | SYSTOLIC BLOOD PRESSURE: 128 MMHG | TEMPERATURE: 97.4 F | RESPIRATION RATE: 16 BRPM

## 2022-09-30 DIAGNOSIS — S00.03XA CONTUSION OF SCALP, INITIAL ENCOUNTER: Primary | ICD-10-CM

## 2022-09-30 PROCEDURE — 70450 CT HEAD/BRAIN W/O DYE: CPT

## 2022-09-30 PROCEDURE — 99283 EMERGENCY DEPT VISIT LOW MDM: CPT

## 2022-11-28 NOTE — CONSULTS
Purpose of the visit was to evaluate for: goals of care/advanced care planning.     Assessment: Pt is located on 4 MTU and is resting with eyes closed. Pt was discharged back to nursing home last week, but returned yesterday due to vomiting and SOB with sats in the 80. Pt continues to struggle with diet and has a history of silent aspiration. Palliative attempting to reach daughter to discuss code status, goals of care, and care options (including comfort and [facility]). SW left a voicemail requesting a return call.     Recommendations/Plan:  Discuss code status and goals of care .    Tasks Completed:  Contacted daughter and requested a return call .    Other Comments: Daughter works during the day, but palliative will continue reaching out to discuss code status and goals of care.   [place]     no murmurs, regular rate and rhythm

## 2023-03-24 ENCOUNTER — APPOINTMENT (OUTPATIENT)
Dept: CT IMAGING | Facility: HOSPITAL | Age: 83
End: 2023-03-24
Payer: MEDICARE

## 2023-03-24 ENCOUNTER — APPOINTMENT (OUTPATIENT)
Dept: GENERAL RADIOLOGY | Facility: HOSPITAL | Age: 83
End: 2023-03-24
Payer: MEDICARE

## 2023-03-24 ENCOUNTER — HOSPITAL ENCOUNTER (EMERGENCY)
Facility: HOSPITAL | Age: 83
Discharge: NURSING, REHAB OR LONG-TERM CARE | End: 2023-03-24
Attending: EMERGENCY MEDICINE | Admitting: EMERGENCY MEDICINE
Payer: MEDICARE

## 2023-03-24 VITALS
BODY MASS INDEX: 24.9 KG/M2 | SYSTOLIC BLOOD PRESSURE: 117 MMHG | DIASTOLIC BLOOD PRESSURE: 35 MMHG | HEART RATE: 76 BPM | RESPIRATION RATE: 16 BRPM | TEMPERATURE: 97.5 F | WEIGHT: 149.47 LBS | HEIGHT: 65 IN | OXYGEN SATURATION: 95 %

## 2023-03-24 DIAGNOSIS — G40.909 RECURRENT SEIZURES: Primary | ICD-10-CM

## 2023-03-24 LAB
ALBUMIN SERPL-MCNC: 3.5 G/DL (ref 3.5–5.2)
ALBUMIN/GLOB SERPL: 1.1 G/DL
ALP SERPL-CCNC: 105 U/L (ref 39–117)
ALT SERPL W P-5'-P-CCNC: 8 U/L (ref 1–33)
ANION GAP SERPL CALCULATED.3IONS-SCNC: 9 MMOL/L (ref 5–15)
AST SERPL-CCNC: 13 U/L (ref 1–32)
BACTERIA UR QL AUTO: ABNORMAL /HPF
BASE EXCESS BLDA CALC-SCNC: 1.1 MMOL/L (ref -2–2)
BDY SITE: ABNORMAL
BILIRUB SERPL-MCNC: 0.2 MG/DL (ref 0–1.2)
BILIRUB UR QL STRIP: NEGATIVE
BUN SERPL-MCNC: 19 MG/DL (ref 8–23)
BUN/CREAT SERPL: 19.2 (ref 7–25)
CALCIUM SPEC-SCNC: 8.7 MG/DL (ref 8.6–10.5)
CHLORIDE SERPL-SCNC: 106 MMOL/L (ref 98–107)
CLARITY UR: CLEAR
CO2 SERPL-SCNC: 26 MMOL/L (ref 22–29)
COHGB MFR BLD: 0.3 % (ref 0–1.5)
COLOR UR: YELLOW
CREAT SERPL-MCNC: 0.99 MG/DL (ref 0.57–1)
EGFRCR SERPLBLD CKD-EPI 2021: 57.8 ML/MIN/1.73
FHHB: 4.8 % (ref 0–5)
GLOBULIN UR ELPH-MCNC: 3.1 GM/DL
GLUCOSE SERPL-MCNC: 112 MG/DL (ref 65–99)
GLUCOSE UR STRIP-MCNC: NEGATIVE MG/DL
HCO3 BLDA-SCNC: 25.6 MMOL/L (ref 22–26)
HGB BLDA-MCNC: 11.6 G/DL (ref 11.7–14.6)
HGB UR QL STRIP.AUTO: NEGATIVE
HOLD SPECIMEN: NORMAL
HYALINE CASTS UR QL AUTO: ABNORMAL /LPF
INHALED O2 CONCENTRATION: 21 %
KETONES UR QL STRIP: NEGATIVE
LEUKOCYTE ESTERASE UR QL STRIP.AUTO: ABNORMAL
MAGNESIUM SERPL-MCNC: 1.9 MG/DL (ref 1.6–2.4)
METHGB BLD QL: 0.1 % (ref 0–1.5)
MODALITY: ABNORMAL
NITRITE UR QL STRIP: NEGATIVE
OXYHGB MFR BLDV: 94.8 % (ref 94–99)
PCO2 BLDA: 40.5 MM HG (ref 35–45)
PH BLDA: 7.42 PH UNITS (ref 7.35–7.45)
PH UR STRIP.AUTO: 7.5 [PH] (ref 5–8)
PO2 BLD: 349 MM[HG] (ref 0–500)
PO2 BLDA: 73.3 MM HG (ref 80–100)
POTASSIUM SERPL-SCNC: 3.7 MMOL/L (ref 3.5–5.2)
PROT SERPL-MCNC: 6.6 G/DL (ref 6–8.5)
PROT UR QL STRIP: ABNORMAL
RBC # UR STRIP: ABNORMAL /HPF
REF LAB TEST METHOD: ABNORMAL
SAO2 % BLDCOA: 95.2 % (ref 95–99)
SODIUM SERPL-SCNC: 141 MMOL/L (ref 136–145)
SP GR UR STRIP: 1.02 (ref 1–1.03)
SQUAMOUS #/AREA URNS HPF: ABNORMAL /HPF
TROPONIN T SERPL HS-MCNC: 29 NG/L
UROBILINOGEN UR QL STRIP: ABNORMAL
WBC # UR STRIP: ABNORMAL /HPF
WHOLE BLOOD HOLD SPECIMEN: NORMAL

## 2023-03-24 PROCEDURE — 93010 ELECTROCARDIOGRAM REPORT: CPT | Performed by: INTERNAL MEDICINE

## 2023-03-24 PROCEDURE — 81001 URINALYSIS AUTO W/SCOPE: CPT | Performed by: EMERGENCY MEDICINE

## 2023-03-24 PROCEDURE — 82375 ASSAY CARBOXYHB QUANT: CPT | Performed by: EMERGENCY MEDICINE

## 2023-03-24 PROCEDURE — 70450 CT HEAD/BRAIN W/O DYE: CPT

## 2023-03-24 PROCEDURE — 99284 EMERGENCY DEPT VISIT MOD MDM: CPT

## 2023-03-24 PROCEDURE — 36415 COLL VENOUS BLD VENIPUNCTURE: CPT

## 2023-03-24 PROCEDURE — 84484 ASSAY OF TROPONIN QUANT: CPT | Performed by: EMERGENCY MEDICINE

## 2023-03-24 PROCEDURE — 71045 X-RAY EXAM CHEST 1 VIEW: CPT

## 2023-03-24 PROCEDURE — P9612 CATHETERIZE FOR URINE SPEC: HCPCS

## 2023-03-24 PROCEDURE — 83050 HGB METHEMOGLOBIN QUAN: CPT | Performed by: EMERGENCY MEDICINE

## 2023-03-24 PROCEDURE — 80053 COMPREHEN METABOLIC PANEL: CPT | Performed by: EMERGENCY MEDICINE

## 2023-03-24 PROCEDURE — 93005 ELECTROCARDIOGRAM TRACING: CPT | Performed by: EMERGENCY MEDICINE

## 2023-03-24 PROCEDURE — 36600 WITHDRAWAL OF ARTERIAL BLOOD: CPT | Performed by: EMERGENCY MEDICINE

## 2023-03-24 PROCEDURE — 82805 BLOOD GASES W/O2 SATURATION: CPT | Performed by: EMERGENCY MEDICINE

## 2023-03-24 PROCEDURE — 83735 ASSAY OF MAGNESIUM: CPT | Performed by: EMERGENCY MEDICINE

## 2023-03-24 RX ORDER — SODIUM CHLORIDE 0.9 % (FLUSH) 0.9 %
10 SYRINGE (ML) INJECTION AS NEEDED
Status: DISCONTINUED | OUTPATIENT
Start: 2023-03-24 | End: 2023-03-24 | Stop reason: HOSPADM

## 2023-03-24 NOTE — ED PROVIDER NOTES
Time: 12:48 PM EDT  Date of encounter:  3/24/2023  Independent Historian/Clinical History and Information was obtained by:   Nurse who obtained info from EMS  Chief Complaint: altered mental status    History is limited by: Nonverbal    History of Present Illness:  Patient is a 82 y.o. year old female who presents to the emergency department for evaluation of AMS. Pt has a hx of CVA and is nonverbal with R-sided deficits along with a history of seizures. Nursing home said pt had agonal breathing. Pt has hx of seizures and did not take her seizure medication today. EMS said she appeared post ictal on arrival. Pt is afebrile.       History provided by: the nurse who obtained info from EMS.  History limited by:  Patient nonverbal   used: No        Patient Care Team  Primary Care Provider: Laurent Blanco MD    Past Medical History:     Allergies   Allergen Reactions   • Erythromycin Anaphylaxis   • Bacitracin Unknown - High Severity   • Cefazolin Unknown - High Severity   • Cephalosporins Unknown - High Severity   • Gramicidin Unknown - High Severity   • Neomycin Unknown - High Severity   • Penicillins Unknown - High Severity   • Polymox [Amoxicillin] Unknown - High Severity   • Quinolones Unknown - High Severity   • Statins Unknown - High Severity   • Sulfa Antibiotics Unknown - High Severity   • Trimethoprim Unknown - High Severity     Past Medical History:   Diagnosis Date   • Anemia    • COPD (chronic obstructive pulmonary disease) ([facility])    • Coronary artery disease    • Dementia ([facility])    • Disease of thyroid gland    • Hyperkalemia    • Hyperlipidemia    • Hypertension    • Seizures (HCC)      History reviewed. No pertinent surgical history.  History reviewed. No pertinent family history.    Home Medications:  Prior to Admission medications    Medication Sig Start Date End Date Taking? Authorizing Provider   acetaminophen (TYLENOL) 500 MG tablet Take 500 mg by mouth Every 6 (Six) Hours As  Needed for Mild Pain  or Fever.    Branden Marion MD   albuterol (PROVENTIL) (2.5 MG/3ML) 0.083% nebulizer solution Take 2.5 mg by nebulization Every 4 (Four) Hours As Needed for Wheezing or Shortness of Air.    Branden Marion MD   amLODIPine (NORVASC) 5 MG tablet Take 0.5 tablets by mouth Daily. Hold for systolic blood pressure less than or equal to 110 mmHg. 9/1/22   Amos Ortega DO   donepezil (ARICEPT) 10 MG tablet Take 10 mg by mouth Every Night.    Branden Marion MD   fenofibrate 160 MG tablet Take 160 mg by mouth Daily.    Branden Marion MD   Flaxseed, Linseed, (Flax Seed Oil) 1000 MG capsule Take 1,000 mg by mouth Daily.    Branden Marion MD   furosemide (LASIX) 20 MG tablet Take 20 mg by mouth Daily.    Branden Marion MD   guaiFENesin (ROBITUSSIN) 100 MG/5ML solution oral solution Take 600 mg by mouth 2 (Two) Times a Day As Needed.    Branden Marion MD   latanoprost (XALATAN) 0.005 % ophthalmic solution Administer 1 drop to both eyes Every Night.    Branden Marion MD   levETIRAcetam (KEPPRA) 500 MG tablet Take 2 tablets by mouth 2 (Two) Times a Day.  Patient taking differently: Take 1,000 mg by mouth Every Night. 5/30/22   Jarrod Yost DO   levETIRAcetam (KEPPRA) 750 MG tablet Take 750 mg by mouth Daily.    Branden Marion MD   levothyroxine (Synthroid) 150 MCG tablet Take 1 tablet by mouth Daily for 30 days. 9/1/22 10/1/22  Amos Ortega DO   Lidocaine HCl (LMX) 4 % cream Apply 1 application topically to the appropriate area as directed 2 (Two) Times a Day. Apply to lower back    Branden Marion MD   loperamide (IMODIUM) 2 MG capsule Take 2 mg by mouth 4 (Four) Times a Day As Needed for Diarrhea.    Branden Marion MD   memantine (NAMENDA) 10 MG tablet Take 10 mg by mouth 2 (Two) Times a Day.    Branden Marion MD   potassium chloride (MICRO-K) 8 MEQ CR capsule Take 8 mEq by mouth Daily.    Branden Marion MD     "    Social History:   Social History     Tobacco Use   • Smoking status: Never   • Smokeless tobacco: Never   Vaping Use   • Vaping Use: Never used   Substance Use Topics   • Alcohol use: Not Currently   • Drug use: Never         Review of Systems:  Review of Systems   Unable to perform ROS: Patient nonverbal        Physical Exam:  BP (!) 117/35 (BP Location: Left arm, Patient Position: Lying)   Pulse 76   Temp 97.5 °F (36.4 °C) (Oral)   Resp 16   Ht 165.1 cm (65\")   Wt 67.8 kg (149 lb 7.6 oz)   SpO2 95%   BMI 24.87 kg/m²     Vital signs were reviewed under triage note.  General appearance - Patient appears well-developed and well-nourished.  Patient is in no acute distress.  Head - Normocephalic.  There are several contusions on her forehead and chin that appear to be several days and age.  Pupils - Equal, round, reactive to light.  Extraocular muscles are intact.  Conjunctive is clear.  Nasal - Normal inspection.  No evidence of trauma or epistaxis.  Tympanic membranes - Gray, intact without erythema or retractions.  Oral mucosa - Pink and moist without lesions or erythema.  Uvula is midline.  Chest wall - Atraumatic.  Chest wall is nontender.  There is no vesicular rashes noted.  Neck - Supple.  Trachea was midline.  There is no palpable lymphadenopathy or thyromegaly.  There are no meningeal signs  Lungs - Clear to auscultation and percussion bilaterally.  Heart - Regular rate and rhythm without any murmurs, clicks, or gallops.  Abdomen - Soft.  Bowel sounds are present.  There is no palpable tenderness.  There is no rebound, guarding, or rigidity.  There are no palpable masses.  There are no pulsatile masses.  Back - Spine is straight and midline.  There is no CVA tenderness.  Extremities - Intact x4 with full range of motion.  There is no palpable edema.  Pulses are intact x4 and equal.  Neurologic - Patient is awake.  She is responsive to verbal stimuli.  Patient will not follow commands however.  "   Integument - There are no rashes.  There are no petechia or purpura lesions noted.  There are no vesicular lesions noted.               Procedures:  Procedures      Medical Decision Making:      Comorbidities that affect care:    COPD, Coronary Artery Disease, Hypertension, Thyroid Disease    External Notes reviewed:    EMS Run sheet: Patient with altered mental status.  Vital signs are stable.      The following orders were placed and all results were independently analyzed by me:  Orders Placed This Encounter   Procedures   • XR Chest 1 View   • CT Head Without Contrast   • [place] Draw   • Comprehensive Metabolic Panel   • Single High Sensitivity Troponin T   • Magnesium   • Urinalysis With Culture If Indicated -   • CBC Auto Differential   • Blood Gas, Arterial -With Co-Ox Panel: Yes   • Urinalysis, Microscopic Only - Urine, Clean Catch   • NPO Diet NPO Type: Strict NPO   • Undress & Gown   • Cardiac Monitoring   • Continuous Pulse Oximetry   • Vital Signs   • Orthostatic Blood Pressure   • Oxygen Therapy- Nasal Cannula; 2 LPM; Titrate for SPO2: 92%, Greater Than or Equal To   • POC Glucose Once   • ECG 12 Lead ED Triage Standing Order; Weak / Dizzy / AMS   • Insert Peripheral IV   • Fall Precautions   • CBC & Differential   • Green Top (Gel)   • Lavender Top   • Gold Top - SST   • Light Blue Top       Medications Given in the Emergency Department:  Medications   sodium chloride 0.9 % flush 10 mL (has no administration in time range)        ED Course:    ED Course as of 03/24/23 1638   Fri Mar 24, 2023   1634 EKG performed at 1214 was interpreted by me to show a normal sinus rhythm with a ventricular rate of 62 bpm.  The parable is 190 ms.  P waves are normal.  QRS interval is normal.  Axis is a 20 degrees.  There are some nonspecific ST-T wave changes noted.  QT corrected is 443 ms. [TB]      ED Course User Index  [TB] Miky Simmons DO     The patient was seen and evaluated the ED by me.  The above history and  physical examination was performed as document.  Diagnostic data was obtained.  Results reviewed.  On repeat evaluation at 1600 the patient is fully awake alert and interacting with her family members.  At this point time I believe her symptoms be that of prior seizure this morning had a postictal state upon initial evaluation.  Patient's ED work-up does not show any acute findings.  Patient subsidy stable for discharge back to her nursing facility.  Patient's family is in agreement with my impression and plan.    Labs:    Lab Results (last 24 hours)     Procedure Component Value Units Date/Time    CBC Auto Differential [[id number]] Updated: 03/24/23 1322    Specimen: Blood     CBC & Differential [[id number]] Collected: 03/24/23 1257    Specimen: Blood Updated: 03/24/23 1322    Narrative:      The following orders were created for panel order CBC & Differential.  Procedure                               Abnormality         Status                     ---------                               -----------         ------                     CBC Auto Differential[[id number]]                                                       Scan Slide[[id number]]                                                                    Please view results for these tests on the individual orders.    Comprehensive Metabolic Panel [[id number]]  (Abnormal) Collected: 03/24/23 1257    Specimen: Blood Updated: 03/24/23 1319     Glucose 112 mg/dL      BUN 19 mg/dL      Creatinine 0.99 mg/dL      Sodium 141 mmol/L      Potassium 3.7 mmol/L      Chloride 106 mmol/L      CO2 26.0 mmol/L      Calcium 8.7 mg/dL      Total Protein 6.6 g/dL      Albumin 3.5 g/dL      ALT (SGPT) 8 U/L      AST (SGOT) 13 U/L      Alkaline Phosphatase 105 U/L      Total Bilirubin 0.2 mg/dL      Globulin 3.1 gm/dL      A/G Ratio 1.1 g/dL      BUN/Creatinine Ratio 19.2     Anion Gap 9.0 mmol/L      eGFR 57.8 mL/min/1.73     Narrative:      GFR Normal >60  Chronic Kidney Disease  <60  Kidney Failure <15    The GFR formula is only valid for adults with stable renal function between ages 18 and 70.    Single High Sensitivity Troponin T [[id number]]  (Abnormal) Collected: 03/24/23 1257    Specimen: Blood Updated: 03/24/23 1319     HS Troponin T 29 ng/L     Narrative:      High Sensitive Troponin T Reference Range:  <10.0 ng/L- Negative Female for AMI  <15.0 ng/L- Negative Male for AMI  >=10 - Abnormal Female indicating possible myocardial injury.  >=15 - Abnormal Male indicating possible myocardial injury.   Clinicians would have to utilize clinical acumen, EKG, Troponin, and serial changes to determine if it is an Acute Myocardial Infarction or myocardial injury due to an underlying chronic condition.         Magnesium [[id number]]  (Normal) Collected: 03/24/23 1257    Specimen: Blood Updated: 03/24/23 1319     Magnesium 1.9 mg/dL     Blood Gas, Arterial -With Co-Ox Panel: Yes [[id number]]  (Abnormal) Collected: 03/24/23 1334    Specimen: Arterial Blood from Arm, Right Updated: 03/24/23 1355     pH, Arterial 7.419 pH units      pCO2, Arterial 40.5 mm Hg      pO2, Arterial 73.3 mm Hg      HCO3, Arterial 25.6 mmol/L      Base Excess, Arterial 1.1 mmol/L      O2 Saturation, Arterial 95.2 %      Hemoglobin, Blood Gas 11.6 g/dL      Carboxyhemoglobin 0.3 %      Methemoglobin 0.10 %      Oxyhemoglobin 94.8 %      FHHB 4.8 %      Site Arterial: right radial     Modality Room Air     FIO2 21 %      PO2/FIO2 349    Urinalysis With Culture If Indicated - Straight Cath [[id number]]  (Abnormal) Collected: 03/24/23 1517    Specimen: Urine from Straight Cath Updated: 03/24/23 1618     Color, UA Yellow     Appearance, UA Clear     pH, UA 7.5     Specific Gravity, UA 1.022     Glucose, UA Negative     Ketones, UA Negative     Bilirubin, UA Negative     Blood, UA Negative     Protein, UA Trace     Leuk Esterase, UA Trace     Nitrite, UA Negative     Urobilinogen, UA 1.0 E.U./dL    Narrative:      In absence of  clinical symptoms, the presence of pyuria, bacteria, and/or nitrites on the urinalysis result does not correlate with infection.    Urinalysis, Microscopic Only - Straight Cath [[id number]]  (Abnormal) Collected: 03/24/23 1517    Specimen: Urine from Straight Cath Updated: 03/24/23 1621     RBC, UA None Seen /HPF      WBC, UA 3-5 /HPF      Comment: Urine culture not indicated.        Bacteria, UA Trace /HPF      Squamous Epithelial Cells, UA 3-6 /HPF      Hyaline Casts, UA 0-2 /LPF      Methodology Manual Light Microscopy           Imaging:    CT Head Without Contrast    Result Date: 3/24/2023  PROCEDURE: CT HEAD WO CONTRAST  COMPARISON:  [facility], CT, CT HEAD WO CONTRAST, 9/30/2022, 11:46. INDICATIONS: Altered mental status  PROTOCOL:   Standard imaging protocol performed    RADIATION:   DLP: 1018.2 mGy*cm   MA and/or KV was adjusted to minimize radiation dose.     TECHNIQUE: After obtaining the patient's consent, CT images were obtained without non-ionic intravenous contrast material.  FINDINGS:  There are stable bilateral subdural hygromas.  The patient has a stable ventriculostomy tube in place.  It enters through the right parietal bone and crosses midline terminating in the deep white matter of the left frontal lobe.  There is stable encephalomalacia in the right temporal lobe.  There is scattered artifact in the right middle cranial fossa secondary to an aneurysm clip.  The patient has had bilateral craniotomies.  There is also aneurysm clip in the left sylvian fissure.  There are stable small focal areas of encephalomalacia in both cerebral hemispheres presumably secondary to old infarcts.  There is no acute hemorrhage or midline shift.  The patient has chronic volume loss as there is prominence of the sulci, fissures, ventricles, and basal cisterns.  There is thinning of the lenses suggestive of previous cataract surgery.  The visualized paranasal and right mastoid sinuses are clear.  The  patient has had previous left mastoid sinus surgery.        1. Stable bilateral subdural hygromas. 2. Stable right-sided ventriculostomy tube with the tip terminating in the deep white matter of the left frontal lobe.  3. Postsurgical changes. 4. Stable area of encephalomalacia in the right temporal lobe.  There are also smaller areas of encephalomalacia in both cerebral hemispheres presumably secondary to old infarcts.  5. Chronic volume loss secondary to cerebral atrophy.      KAVON COX MD       Electronically Signed and Approved By: KAVON COX MD on 3/24/2023 at 14:31             XR Chest 1 View    Result Date: 3/24/2023  PROCEDURE: XR CHEST 1 VW  COMPARISON: [facility], CR, XR CHEST 1 VW, 9/01/2022, 7:28.  INDICATIONS: Weak/Dizzy/AMS triage protocol  FINDINGS:  The heart size is normal.  The pulmonary vascular markings are normal.  There is an electronic implanted cardiac device in place.  The lungs and pleural spaces are clear.  There are chronic age-related changes involving the bony thorax and thoracic aorta.  There is shunt tubing projecting over the right chest.       No active disease.       KAVON COX MD       Electronically Signed and Approved By: KAVON COX MD on 3/24/2023 at 13:01                 Differential Diagnosis and Discussion:    Altered Mental Status: Based on the patient's signs and symptoms, differential diagnosis includes but is not limited to meningitis, stroke, sepsis, subarachnoid hemorrhage, intracranial bleeding, encephalitis, and metabolic encephalopathy.    All labs were reviewed and interpreted by me.  All X-rays were independently reviewed by me.  EKG was interpreted by me.    MDM         Patient Care Considerations:          Consultants/Shared Management Plan:    None    Social Determinants of Health:    Patient is a nursing home/assisted living resident and has reliable access to care.      Disposition and Care Coordination:    Discharged: The patient is suitable  and stable for discharge with no need for consideration of observation or admission.        Final diagnoses:   Recurrent seizures (HCC)        ED Disposition     ED Disposition   Discharge    Condition   Stable    Comment   --             This medical record created using voice recognition software.    Documentation assistance provided by Stephen Farley acting as scribe for Miky Simmons DO. Information recorded by the scribe was done at my direction and has been verified and validated by me.          Stephen Farley  03/24/23 1304       Miky Simmons DO  03/24/23 7847

## 2023-03-24 NOTE — DISCHARGE INSTRUCTIONS
Resume prior nursing home medications and orders.  Return to the ER for any concerns issues that may arise.

## 2023-03-25 LAB — QT INTERVAL: 436 MS

## 2023-03-27 NOTE — PAYOR COMM NOTE
"SUBJECT:     AMBULANCE TRANSPORTATION AUTHORIZATION REQUEST    PATIENT'S NAME:     Kezia HINSON [facility] MRN:     [id number]     DOS:     3/24/2023    INSURANCE MEMBER ID:     [id number]        SERVICE PROVIDER  Company: [facility]  NPI    [id number]   Tax ID  [id number]  Address[place]  [address] [place] [address]  Phone:      [phone]  Fax:    [phone]        REQUESTING FACILITY  Name:  [facility]   NPI:  [id number]  TID:  [id number]  Address:      [address] [address]  Phone:             [phone]        CASE MANAGEMENT CONTACT INFORMATION  Name:     Marly Rivas             Phone:    [phone]  Fax:  [phone]        DIAGNOSIS AND HOSPITAL PROBLEMS    Problems Addressed this Visit    None  Visit Diagnoses     Recurrent seizures (HCC)    -  Primary      Diagnoses       Codes Comments    Recurrent seizures (HCC)    -  Primary ICD-10-CM: G40.909  ICD-9-CM: 345.80           Kezia Garcia (82 y.o. Female)     Date of Birth   1940    Social Security Number       Address   [address] [place], [place] [address]    Home Phone   [phone]    MRN   [id number]       [employer]ism   Congregational    Marital Status                               Admission Date   3/24/23    Admission Type   Emergency    Admitting Provider       Attending Provider       Department, Room/Bed   [facility] EMERGENCY ROOM, 33/33       Discharge Date   3/24/2023    Discharge Disposition   Nursing Facility (DC - External)    Discharge Destination                               Attending Provider: (none)   Allergies: Erythromycin, Bacitracin, Cefazolin, Cephalosporins, Gramicidin, Neomycin, Penicillins, Polymox [Amoxicillin], Quinolones, Statins, Sulfa Antibiotics, Trimethoprim    Isolation: None   Infection: None   Code Status: Prior    Ht: 165.1 cm (65\")   Wt: 67.8 kg (149 lb 7.6 oz)    Admission Cmt: None   Principal Problem: None          "       Active Insurance as of 3/24/2023     Primary Coverage     Payor Plan Insurance Group Employer/Plan Group    ANTHEM MEDICARE REPLACEMENT ANTHEM MEDICARE ADVANTAGE [id number]     Payor Plan Address Payor Plan Phone Number Payor Plan Fax Number Effective Dates    [address] [phone]  1/1/2019 - None Entered    [facility] [id number]       Subscriber Name Subscriber Birth Date Member ID       GEORGETTE DAILY 1940 [id number]           Secondary Coverage     Payor Plan Insurance Group Employer/Plan Group    [place]      Payor Plan Address Payor Plan Phone Number Payor Plan Fax Number Effective Dates    [address] [phone]  7/1/2021 - None Entered    [facility] [address]       Subscriber Name Subscriber Birth Date Member ID       GEORGETTE DAILY 1940 [id number]                 Emergency Contacts      (Rel.) Home Phone Work Phone Mobile Phone    EMELY (POA)YIN (Daughter) [phone] -- [phone]    KILLIANTEJAL (Son) [phone] -- [phone]                             Emergency Department Notes      Miky Simmons DO at 03/24/23 1248          Time: 12:48 PM EDT  Date of encounter:  3/24/2023  Independent Historian/Clinical History and Information was obtained by:   Nurse who obtained info from EMS  Chief Complaint: altered mental status    History is limited by: Nonverbal    History of Present Illness:  Patient is a 82 y.o. year old female who presents to the emergency department for evaluation of AMS. Pt has a hx of CVA and is nonverbal with R-sided deficits along with a history of seizures. Nursing home said pt had agonal breathing. Pt has hx of seizures and did not take her seizure medication today. EMS said she appeared post ictal on arrival. Pt is afebrile.       History provided by: the nurse who obtained info from EMS.  History limited by:  Patient nonverbal   used: No        Patient Care Team  Primary Care Provider:  Laurent Blanco MD    Past Medical History:     Allergies   Allergen Reactions   • Erythromycin Anaphylaxis   • Bacitracin Unknown - High Severity   • Cefazolin Unknown - High Severity   • Cephalosporins Unknown - High Severity   • Gramicidin Unknown - High Severity   • Neomycin Unknown - High Severity   • Penicillins Unknown - High Severity   • Polymox [Amoxicillin] Unknown - High Severity   • Quinolones Unknown - High Severity   • Statins Unknown - High Severity   • Sulfa Antibiotics Unknown - High Severity   • Trimethoprim Unknown - High Severity     Past Medical History:   Diagnosis Date   • Anemia    • COPD (chronic obstructive pulmonary disease) ([facility])    • Coronary artery disease    • Dementia ([facility])    • Disease of thyroid gland    • Hyperkalemia    • Hyperlipidemia    • Hypertension    • Seizures ([facility])      History reviewed. No pertinent surgical history.  History reviewed. No pertinent family history.    Home Medications:  Prior to Admission medications    Medication Sig Start Date End Date Taking? Authorizing Provider   acetaminophen (TYLENOL) 500 MG tablet Take 500 mg by mouth Every 6 (Six) Hours As Needed for Mild Pain  or Fever.    ProviderBranden MD   albuterol (PROVENTIL) (2.5 MG/3ML) 0.083% nebulizer solution Take 2.5 mg by nebulization Every 4 (Four) Hours As Needed for Wheezing or Shortness of Air.    ProviderBranden MD   amLODIPine (NORVASC) 5 MG tablet Take 0.5 tablets by mouth Daily. Hold for systolic blood pressure less than or equal to 110 mmHg. 9/1/22   Amos Ortega DO   donepezil (ARICEPT) 10 MG tablet Take 10 mg by mouth Every Night.    ProviderBranden MD   fenofibrate 160 MG tablet Take 160 mg by mouth Daily.    ProviderBranden MD   Flaxseed, Linseed, (Flax Seed Oil) 1000 MG capsule Take 1,000 mg by mouth Daily.    ProviderBranden MD   furosemide (LASIX) 20 MG tablet Take 20 mg by mouth Daily.    ProviderBranden MD   guaiFENesin (ROBITUSSIN) 100  "MG/5ML solution oral solution Take 600 mg by mouth 2 (Two) Times a Day As Needed.    Branden Marion MD   latanoprost (XALATAN) 0.005 % ophthalmic solution Administer 1 drop to both eyes Every Night.    Branden Marion MD   levETIRAcetam (KEPPRA) 500 MG tablet Take 2 tablets by mouth 2 (Two) Times a Day.  Patient taking differently: Take 1,000 mg by mouth Every Night. 5/30/22   Jarrod Yost DO   levETIRAcetam (KEPPRA) 750 MG tablet Take 750 mg by mouth Daily.    Branden Marion MD   levothyroxine (Synthroid) 150 MCG tablet Take 1 tablet by mouth Daily for 30 days. 9/1/22 10/1/22  Amos Ortega DO   Lidocaine HCl (LMX) 4 % cream Apply 1 application topically to the appropriate area as directed 2 (Two) Times a Day. Apply to lower back    Branden Marion MD   loperamide (IMODIUM) 2 MG capsule Take 2 mg by mouth 4 (Four) Times a Day As Needed for Diarrhea.    Branden Marion MD   memantine (NAMENDA) 10 MG tablet Take 10 mg by mouth 2 (Two) Times a Day.    Branden Marion MD   potassium chloride (MICRO-K) 8 MEQ CR capsule Take 8 mEq by mouth Daily.    Branden Marion MD        Social History:   Social History     Tobacco Use   • Smoking status: Never   • Smokeless tobacco: Never   Vaping Use   • Vaping Use: Never used   Substance Use Topics   • Alcohol use: Not Currently   • Drug use: Never         Review of Systems:  Review of Systems   Unable to perform ROS: Patient nonverbal        Physical Exam:  BP (!) 117/35 (BP Location: Left arm, Patient Position: Lying)   Pulse 76   Temp 97.5 °F (36.4 °C) (Oral)   Resp 16   Ht 165.1 cm (65\")   Wt 67.8 kg (149 lb 7.6 oz)   SpO2 95%   BMI 24.87 kg/m²     Vital signs were reviewed under triage note.  General appearance - Patient appears well-developed and well-nourished.  Patient is in no acute distress.  Head - Normocephalic.  There are several contusions on her forehead and chin that appear to be several days and age.  Pupils - " Equal, round, reactive to light.  Extraocular muscles are intact.  Conjunctive is clear.  Nasal - Normal inspection.  No evidence of trauma or epistaxis.  Tympanic membranes - Gray, intact without erythema or retractions.  Oral mucosa - Pink and moist without lesions or erythema.  Uvula is midline.  Chest wall - Atraumatic.  Chest wall is nontender.  There is no vesicular rashes noted.  Neck - Supple.  Trachea was midline.  There is no palpable lymphadenopathy or thyromegaly.  There are no meningeal signs  Lungs - Clear to auscultation and percussion bilaterally.  Heart - Regular rate and rhythm without any murmurs, clicks, or gallops.  Abdomen - Soft.  Bowel sounds are present.  There is no palpable tenderness.  There is no rebound, guarding, or rigidity.  There are no palpable masses.  There are no pulsatile masses.  Back - Spine is straight and midline.  There is no CVA tenderness.  Extremities - Intact x4 with full range of motion.  There is no palpable edema.  Pulses are intact x4 and equal.  Neurologic - Patient is awake.  She is responsive to verbal stimuli.  Patient will not follow commands however.    Integument - There are no rashes.  There are no petechia or purpura lesions noted.  There are no vesicular lesions noted.              Procedures:  Procedures      Medical Decision Making:      Comorbidities that affect care:    COPD, Coronary Artery Disease, Hypertension, Thyroid Disease    External Notes reviewed:    EMS Run sheet: Patient with altered mental status.  Vital signs are stable.      The following orders were placed and all results were independently analyzed by me:  Orders Placed This Encounter   Procedures   • XR Chest 1 View   • CT Head Without Contrast   • [place] Draw   • Comprehensive Metabolic Panel   • Single High Sensitivity Troponin T   • Magnesium   • Urinalysis With Culture If Indicated -   • CBC Auto Differential   • Blood Gas, Arterial -With Co-Ox Panel: Yes   • Urinalysis,  Microscopic Only - Urine, Clean Catch   • NPO Diet NPO Type: Strict NPO   • Undress & Gown   • Cardiac Monitoring   • Continuous Pulse Oximetry   • Vital Signs   • Orthostatic Blood Pressure   • Oxygen Therapy- Nasal Cannula; 2 LPM; Titrate for SPO2: 92%, Greater Than or Equal To   • POC Glucose Once   • ECG 12 Lead ED Triage Standing Order; Weak / Dizzy / AMS   • Insert Peripheral IV   • Fall Precautions   • CBC & Differential   • Green Top (Gel)   • Lavender Top   • Gold Top - SST   • Light Blue Top       Medications Given in the Emergency Department:  Medications   sodium chloride 0.9 % flush 10 mL (has no administration in time range)        ED Course:    ED Course as of 03/24/23 1638   Fri Mar 24, 2023   1634 EKG performed at 1214 was interpreted by me to show a normal sinus rhythm with a ventricular rate of 62 bpm.  The parable is 190 ms.  P waves are normal.  QRS interval is normal.  Axis is a 20 degrees.  There are some nonspecific ST-T wave changes noted.  QT corrected is 443 ms. [TB]      ED Course User Index  [TB] Miky Simmons DO     The patient was seen and evaluated the ED by me.  The above history and physical examination was performed as document.  Diagnostic data was obtained.  Results reviewed.  On repeat evaluation at 1600 the patient is fully awake alert and interacting with her family members.  At this point time I believe her symptoms be that of prior seizure this morning had a postictal state upon initial evaluation.  Patient's ED work-up does not show any acute findings.  Patient subsidy stable for discharge back to her nursing facility.  Patient's family is in agreement with my impression and plan.    Labs:    Lab Results (last 24 hours)     Procedure Component Value Units Date/Time    CBC Auto Differential [[id number]] Updated: 03/24/23 1322    Specimen: Blood     CBC & Differential [[id number]] Collected: 03/24/23 1257    Specimen: Blood Updated: 03/24/23 1322    Narrative:      The following  orders were created for panel order CBC & Differential.  Procedure                               Abnormality         Status                     ---------                               -----------         ------                     CBC Auto Differential[[id number]]                                                       Scan Slide[[id number]]                                                                    Please view results for these tests on the individual orders.    Comprehensive Metabolic Panel [[id number]]  (Abnormal) Collected: 03/24/23 1257    Specimen: Blood Updated: 03/24/23 1319     Glucose 112 mg/dL      BUN 19 mg/dL      Creatinine 0.99 mg/dL      Sodium 141 mmol/L      Potassium 3.7 mmol/L      Chloride 106 mmol/L      CO2 26.0 mmol/L      Calcium 8.7 mg/dL      Total Protein 6.6 g/dL      Albumin 3.5 g/dL      ALT (SGPT) 8 U/L      AST (SGOT) 13 U/L      Alkaline Phosphatase 105 U/L      Total Bilirubin 0.2 mg/dL      Globulin 3.1 gm/dL      A/G Ratio 1.1 g/dL      BUN/Creatinine Ratio 19.2     Anion Gap 9.0 mmol/L      eGFR 57.8 mL/min/1.73     Narrative:      GFR Normal >60  Chronic Kidney Disease <60  Kidney Failure <15    The GFR formula is only valid for adults with stable renal function between ages 18 and 70.    Single High Sensitivity Troponin T [[id number]]  (Abnormal) Collected: 03/24/23 1257    Specimen: Blood Updated: 03/24/23 1319     HS Troponin T 29 ng/L     Narrative:      High Sensitive Troponin T Reference Range:  <10.0 ng/L- Negative Female for AMI  <15.0 ng/L- Negative Male for AMI  >=10 - Abnormal Female indicating possible myocardial injury.  >=15 - Abnormal Male indicating possible myocardial injury.   Clinicians would have to utilize clinical acumen, EKG, Troponin, and serial changes to determine if it is an Acute Myocardial Infarction or myocardial injury due to an underlying chronic condition.         Magnesium [[id number]]  (Normal) Collected: 03/24/23 1257    Specimen: Blood  Updated: 03/24/23 1319     Magnesium 1.9 mg/dL     Blood Gas, Arterial -With Co-Ox Panel: Yes [[id number]]  (Abnormal) Collected: 03/24/23 1334    Specimen: Arterial Blood from Arm, Right Updated: 03/24/23 1355     pH, Arterial 7.419 pH units      pCO2, Arterial 40.5 mm Hg      pO2, Arterial 73.3 mm Hg      HCO3, Arterial 25.6 mmol/L      Base Excess, Arterial 1.1 mmol/L      O2 Saturation, Arterial 95.2 %      Hemoglobin, Blood Gas 11.6 g/dL      Carboxyhemoglobin 0.3 %      Methemoglobin 0.10 %      Oxyhemoglobin 94.8 %      FHHB 4.8 %      Site Arterial: right radial     Modality Room Air     FIO2 21 %      PO2/FIO2 349    Urinalysis With Culture If Indicated - Straight Cath [[id number]]  (Abnormal) Collected: 03/24/23 1517    Specimen: Urine from Straight Cath Updated: 03/24/23 1618     Color, UA Yellow     Appearance, UA Clear     pH, UA 7.5     Specific Gravity, UA 1.022     Glucose, UA Negative     Ketones, UA Negative     Bilirubin, UA Negative     Blood, UA Negative     Protein, UA Trace     Leuk Esterase, UA Trace     Nitrite, UA Negative     Urobilinogen, UA 1.0 E.U./dL    Narrative:      In absence of clinical symptoms, the presence of pyuria, bacteria, and/or nitrites on the urinalysis result does not correlate with infection.    Urinalysis, Microscopic Only - Straight Cath [[id number]]  (Abnormal) Collected: 03/24/23 1517    Specimen: Urine from Straight Cath Updated: 03/24/23 1621     RBC, UA None Seen /HPF      WBC, UA 3-5 /HPF      Comment: Urine culture not indicated.        Bacteria, UA Trace /HPF      Squamous Epithelial Cells, UA 3-6 /HPF      Hyaline Casts, UA 0-2 /LPF      Methodology Manual Light Microscopy           Imaging:    CT Head Without Contrast    Result Date: 3/24/2023  PROCEDURE: CT HEAD WO CONTRAST  COMPARISON:  [facility], CT, CT HEAD WO CONTRAST, 9/30/2022, 11:46. INDICATIONS: Altered mental status  PROTOCOL:   Standard imaging protocol performed    RADIATION:   DLP:  1018.2 mGy*cm   MA and/or KV was adjusted to minimize radiation dose.     TECHNIQUE: After obtaining the patient's consent, CT images were obtained without non-ionic intravenous contrast material.  FINDINGS:  There are stable bilateral subdural hygromas.  The patient has a stable ventriculostomy tube in place.  It enters through the right parietal bone and crosses midline terminating in the deep white matter of the left frontal lobe.  There is stable encephalomalacia in the right temporal lobe.  There is scattered artifact in the right middle cranial fossa secondary to an aneurysm clip.  The patient has had bilateral craniotomies.  There is also aneurysm clip in the left sylvian fissure.  There are stable small focal areas of encephalomalacia in both cerebral hemispheres presumably secondary to old infarcts.  There is no acute hemorrhage or midline shift.  The patient has chronic volume loss as there is prominence of the sulci, fissures, ventricles, and basal cisterns.  There is thinning of the lenses suggestive of previous cataract surgery.  The visualized paranasal and right mastoid sinuses are clear.  The patient has had previous left mastoid sinus surgery.        1. Stable bilateral subdural hygromas. 2. Stable right-sided ventriculostomy tube with the tip terminating in the deep white matter of the left frontal lobe.  3. Postsurgical changes. 4. Stable area of encephalomalacia in the right temporal lobe.  There are also smaller areas of encephalomalacia in both cerebral hemispheres presumably secondary to old infarcts.  5. Chronic volume loss secondary to cerebral atrophy.      KAVON COX MD       Electronically Signed and Approved By: KAVON COX MD on 3/24/2023 at 14:31             XR Chest 1 View    Result Date: 3/24/2023  PROCEDURE: XR CHEST 1 VW  COM[facility]: [facility], , XR CHEST 1 VW, 9/01/2022, 7:28.  INDICATIONS: Weak/Dizzy/AMS triage protocol  FINDINGS:  The heart size is normal.  The  pulmonary vascular markings are normal.  There is an electronic implanted cardiac device in place.  The lungs and pleural spaces are clear.  There are chronic age-related changes involving the bony thorax and thoracic aorta.  There is shunt tubing projecting over the right chest.       No active disease.       KAVON COX MD       Electronically Signed and Approved By: KAVON COX MD on 3/24/2023 at 13:01                 Differential Diagnosis and Discussion:    Altered Mental Status: Based on the patient's signs and symptoms, differential diagnosis includes but is not limited to meningitis, stroke, sepsis, subarachnoid hemorrhage, intracranial bleeding, encephalitis, and metabolic encephalopathy.    All labs were reviewed and interpreted by me.  All X-rays were independently reviewed by me.  EKG was interpreted by me.    MDM         Patient Care Considerations:          Consultants/Shared Management Plan:    None    Social Determinants of Health:    Patient is a nursing home/assisted living resident and has reliable access to care.      Disposition and Care Coordination:    Discharged: The patient is suitable and stable for discharge with no need for consideration of observation or admission.        Final diagnoses:   Recurrent seizures (HCC)        ED Disposition     ED Disposition   Discharge    Condition   Stable    Comment   --             This medical record created using voice recognition software.    Documentation assistance provided by Stephen Farley acting as scribe for Miky Simmons DO. Information recorded by the scribe was done at my direction and has been verified and validated by me.          Stephen Farley  03/24/23 1302       Miky Simmons DO  03/24/23 9465      Electronically signed by Miky Simmons DO at 03/24/23 7453

## 2023-10-05 ENCOUNTER — TRANSCRIBE ORDERS (OUTPATIENT)
Dept: ADMINISTRATIVE | Facility: HOSPITAL | Age: 83
End: 2023-10-05
Payer: MEDICARE

## 2023-10-05 DIAGNOSIS — R91.8 HILAR MASS: Primary | ICD-10-CM

## 2023-10-05 DIAGNOSIS — J90 PLEURAL EFFUSION: ICD-10-CM

## 2023-10-26 ENCOUNTER — HOSPITAL ENCOUNTER (OUTPATIENT)
Dept: CT IMAGING | Facility: HOSPITAL | Age: 83
Discharge: HOME | End: 2023-10-26
Admitting: INTERNAL MEDICINE
Payer: MEDICARE

## 2023-10-26 DIAGNOSIS — R91.8 HILAR MASS: ICD-10-CM

## 2023-10-26 DIAGNOSIS — J90 PLEURAL EFFUSION: ICD-10-CM

## 2023-10-26 PROCEDURE — 71250 CT THORAX DX C-: CPT

## 2023-12-31 ENCOUNTER — APPOINTMENT (OUTPATIENT)
Dept: GENERAL RADIOLOGY | Facility: HOSPITAL | Age: 83
End: 2023-12-31
Payer: MEDICARE

## 2023-12-31 ENCOUNTER — HOSPITAL ENCOUNTER (EMERGENCY)
Facility: HOSPITAL | Age: 83
Discharge: NURSING, REHAB OR LONG-TERM CARE | End: 2023-12-31
Attending: EMERGENCY MEDICINE | Admitting: EMERGENCY MEDICINE
Payer: MEDICARE

## 2023-12-31 VITALS
DIASTOLIC BLOOD PRESSURE: 49 MMHG | HEART RATE: 115 BPM | TEMPERATURE: 100.3 F | RESPIRATION RATE: 25 BRPM | SYSTOLIC BLOOD PRESSURE: 108 MMHG | OXYGEN SATURATION: 99 %

## 2023-12-31 DIAGNOSIS — Z51.5 END OF LIFE CARE: ICD-10-CM

## 2023-12-31 DIAGNOSIS — N39.0 ACUTE UTI: Primary | ICD-10-CM

## 2023-12-31 LAB
BACTERIA UR QL AUTO: ABNORMAL /HPF
BASOPHILS # BLD AUTO: 0.03 10*3/MM3 (ref 0–0.2)
BASOPHILS NFR BLD AUTO: 0.4 % (ref 0–1.5)
BILIRUB UR QL STRIP: ABNORMAL
CLARITY UR: ABNORMAL
COLOR UR: ABNORMAL
DEPRECATED RDW RBC AUTO: 59 FL (ref 37–54)
EOSINOPHIL # BLD AUTO: 0.05 10*3/MM3 (ref 0–0.4)
EOSINOPHIL NFR BLD AUTO: 0.7 % (ref 0.3–6.2)
ERYTHROCYTE [DISTWIDTH] IN BLOOD BY AUTOMATED COUNT: 15.9 % (ref 12.3–15.4)
GLUCOSE UR STRIP-MCNC: NEGATIVE MG/DL
HCT VFR BLD AUTO: 43.3 % (ref 34–46.6)
HGB BLD-MCNC: 13.4 G/DL (ref 12–15.9)
HGB UR QL STRIP.AUTO: NEGATIVE
HOLD SPECIMEN: NORMAL
HYALINE CASTS UR QL AUTO: ABNORMAL /LPF
IMM GRANULOCYTES # BLD AUTO: 0.03 10*3/MM3 (ref 0–0.05)
IMM GRANULOCYTES NFR BLD AUTO: 0.4 % (ref 0–0.5)
KETONES UR QL STRIP: ABNORMAL
LEUKOCYTE ESTERASE UR QL STRIP.AUTO: ABNORMAL
LYMPHOCYTES # BLD AUTO: 2.13 10*3/MM3 (ref 0.7–3.1)
LYMPHOCYTES NFR BLD AUTO: 29.4 % (ref 19.6–45.3)
MCH RBC QN AUTO: 30.5 PG (ref 26.6–33)
MCHC RBC AUTO-ENTMCNC: 30.9 G/DL (ref 31.5–35.7)
MCV RBC AUTO: 98.6 FL (ref 79–97)
MONOCYTES # BLD AUTO: 0.57 10*3/MM3 (ref 0.1–0.9)
MONOCYTES NFR BLD AUTO: 7.9 % (ref 5–12)
NEUTROPHILS NFR BLD AUTO: 4.44 10*3/MM3 (ref 1.7–7)
NEUTROPHILS NFR BLD AUTO: 61.2 % (ref 42.7–76)
NITRITE UR QL STRIP: POSITIVE
NRBC BLD AUTO-RTO: 0 /100 WBC (ref 0–0.2)
NT-PROBNP SERPL-MCNC: 675.1 PG/ML (ref 0–1800)
PH UR STRIP.AUTO: <=5 [PH] (ref 5–8)
PLATELET # BLD AUTO: 153 10*3/MM3 (ref 140–450)
PMV BLD AUTO: 13 FL (ref 6–12)
PROT UR QL STRIP: ABNORMAL
RBC # BLD AUTO: 4.39 10*6/MM3 (ref 3.77–5.28)
RBC # UR STRIP: ABNORMAL /HPF
REF LAB TEST METHOD: ABNORMAL
SP GR UR STRIP: 1.03 (ref 1–1.03)
SQUAMOUS #/AREA URNS HPF: ABNORMAL /HPF
TRANS CELLS #/AREA URNS HPF: ABNORMAL /HPF
UROBILINOGEN UR QL STRIP: ABNORMAL
WBC # UR STRIP: ABNORMAL /HPF
WBC NRBC COR # BLD AUTO: 7.25 10*3/MM3 (ref 3.4–10.8)
WHOLE BLOOD HOLD COAG: NORMAL
WHOLE BLOOD HOLD SPECIMEN: NORMAL
YEAST URNS QL MICRO: ABNORMAL /HPF

## 2023-12-31 PROCEDURE — 93005 ELECTROCARDIOGRAM TRACING: CPT

## 2023-12-31 PROCEDURE — 51702 INSERT TEMP BLADDER CATH: CPT

## 2023-12-31 PROCEDURE — 99284 EMERGENCY DEPT VISIT MOD MDM: CPT

## 2023-12-31 PROCEDURE — 87086 URINE CULTURE/COLONY COUNT: CPT | Performed by: EMERGENCY MEDICINE

## 2023-12-31 PROCEDURE — 93005 ELECTROCARDIOGRAM TRACING: CPT | Performed by: EMERGENCY MEDICINE

## 2023-12-31 PROCEDURE — 83880 ASSAY OF NATRIURETIC PEPTIDE: CPT | Performed by: EMERGENCY MEDICINE

## 2023-12-31 PROCEDURE — 85025 COMPLETE CBC W/AUTO DIFF WBC: CPT

## 2023-12-31 PROCEDURE — 87040 BLOOD CULTURE FOR BACTERIA: CPT | Performed by: EMERGENCY MEDICINE

## 2023-12-31 PROCEDURE — 81001 URINALYSIS AUTO W/SCOPE: CPT | Performed by: EMERGENCY MEDICINE

## 2023-12-31 PROCEDURE — 71045 X-RAY EXAM CHEST 1 VIEW: CPT

## 2023-12-31 RX ORDER — SODIUM CHLORIDE 0.9 % (FLUSH) 0.9 %
10 SYRINGE (ML) INJECTION AS NEEDED
Status: DISCONTINUED | OUTPATIENT
Start: 2023-12-31 | End: 2023-12-31 | Stop reason: HOSPADM

## 2024-01-01 NOTE — DISCHARGE INSTRUCTIONS
We did not perform standard workup.  Emergency department based on confirmed family request.  The patient does have evidence of urinary tract infection.  We have not given any treatment here so this may be instituted at your nursing facility based on family request.

## 2024-01-01 NOTE — ED PROVIDER NOTES
Time: 8:13 PM EST  Date of encounter:  12/31/2023  Independent Historian/Clinical History and Information was obtained by:   Nursing Staff    History is limited by: Altered Mental Status    Chief Complaint: Altered mental status      History of Present Illness:  Patient is a 83 y.o. year old female who presents to the emergency department for evaluation of altered mental status.  From nursing home with increasing oxygen demand.  Per nursing report the patient is currently in the process of becoming comfort care only however there is concern for pneumonia and family is requesting treatment as needed for infection without admission if at all possible.  No further history available as the patient is nonverbal/altered.    HPI    Patient Care Team  Primary Care Provider: Laurent Blanco MD    Past Medical History:     Allergies   Allergen Reactions    Erythromycin Anaphylaxis    Bacitracin Unknown - High Severity    Cefazolin Unknown - High Severity    Cephalosporins Unknown - High Severity    Gramicidin Unknown - High Severity    Neomycin Unknown - High Severity    Penicillins Unknown - High Severity    Polymox [Amoxicillin] Unknown - High Severity    Quinolones Unknown - High Severity    Statins Unknown - High Severity    Sulfa Antibiotics Unknown - High Severity    Trimethoprim Unknown - High Severity     Past Medical History:   Diagnosis Date    Anemia     COPD (chronic obstructive pulmonary disease)     Coronary artery disease     Dementia     Disease of thyroid gland     Hyperkalemia     Hyperlipidemia     Hypertension     Seizures      No past surgical history on file.  No family history on file.    Home Medications:  Prior to Admission medications    Medication Sig Start Date End Date Taking? Authorizing Provider   acetaminophen (TYLENOL) 500 MG tablet Take 500 mg by mouth Every 6 (Six) Hours As Needed for Mild Pain  or Fever.    Provider, MD Branden   albuterol (PROVENTIL) (2.5 MG/3ML) 0.083% nebulizer  solution Take 2.5 mg by nebulization Every 4 (Four) Hours As Needed for Wheezing or Shortness of Air.    Branden Marion MD   amLODIPine (NORVASC) 5 MG tablet Take 0.5 tablets by mouth Daily. Hold for systolic blood pressure less than or equal to 110 mmHg. 9/1/22   Amos Ortega DO   donepezil (ARICEPT) 10 MG tablet Take 10 mg by mouth Every Night.    Branden Marion MD   fenofibrate 160 MG tablet Take 160 mg by mouth Daily.    Branden Marion MD   Flaxseed, Linseed, (Flax Seed Oil) 1000 MG capsule Take 1,000 mg by mouth Daily.    Branden Marion MD   furosemide (LASIX) 20 MG tablet Take 20 mg by mouth Daily.    Branden Marion MD   guaiFENesin (ROBITUSSIN) 100 MG/5ML solution oral solution Take 600 mg by mouth 2 (Two) Times a Day As Needed.    Branden Marion MD   latanoprost (XALATAN) 0.005 % ophthalmic solution Administer 1 drop to both eyes Every Night.    Branden Marion MD   levETIRAcetam (KEPPRA) 500 MG tablet Take 2 tablets by mouth 2 (Two) Times a Day.  Patient taking differently: Take 1,000 mg by mouth Every Night. 5/30/22   Jarrod Yost DO   levETIRAcetam (KEPPRA) 750 MG tablet Take 750 mg by mouth Daily.    Branden Marion MD   levothyroxine (Synthroid) 150 MCG tablet Take 1 tablet by mouth Daily for 30 days. 9/1/22 10/1/22  Amos Ortega DO   Lidocaine HCl (LMX) 4 % cream Apply 1 application topically to the appropriate area as directed 2 (Two) Times a Day. Apply to lower back    Branden Marion MD   loperamide (IMODIUM) 2 MG capsule Take 2 mg by mouth 4 (Four) Times a Day As Needed for Diarrhea.    Branden Marion MD   memantine (NAMENDA) 10 MG tablet Take 10 mg by mouth 2 (Two) Times a Day.    Branden Marion MD   potassium chloride (MICRO-K) 8 MEQ CR capsule Take 8 mEq by mouth Daily.    Branden Marion MD        Social History:   Social History     Tobacco Use    Smoking status: Never    Smokeless tobacco: Never   Vaping  Use    Vaping Use: Never used   Substance Use Topics    Alcohol use: Not Currently    Drug use: Never         Review of Systems:  Review of Systems   Unable to perform ROS: Patient nonverbal   Respiratory:  Positive for shortness of breath.    Psychiatric/Behavioral:  Positive for confusion.         Physical Exam:  /49   Pulse 115   Temp 100.3 °F (37.9 °C) (Rectal)   Resp 25   SpO2 99%     Physical Exam  Vitals and nursing note reviewed.   Constitutional:       General: She is awake.      Appearance: Normal appearance. She is ill-appearing.   HENT:      Head: Normocephalic and atraumatic.      Nose: Nose normal.      Mouth/Throat:      Mouth: Mucous membranes are moist.   Eyes:      Extraocular Movements: Extraocular movements intact.      Pupils: Pupils are equal, round, and reactive to light.   Cardiovascular:      Rate and Rhythm: Normal rate and regular rhythm.      Heart sounds: Normal heart sounds.   Pulmonary:      Effort: Pulmonary effort is normal. No respiratory distress.      Breath sounds: No wheezing, rhonchi or rales.      Comments: Diminished breath sounds bilaterally  Abdominal:      General: Bowel sounds are normal.      Palpations: Abdomen is soft.      Tenderness: There is no abdominal tenderness. There is no guarding or rebound.      Comments: No rigidity   Musculoskeletal:         General: No tenderness. Normal range of motion.      Cervical back: Normal range of motion and neck supple.   Skin:     General: Skin is warm and dry.      Coloration: Skin is not jaundiced.   Neurological:      General: No focal deficit present.      Mental Status: She is lethargic, disoriented and unresponsive.      GCS: GCS eye subscore is 4. GCS verbal subscore is 2. GCS motor subscore is 4.                  Procedures:  Procedures      Medical Decision Making:      Comorbidities that affect care:    Dementia, COPD, hypertension, anemia    External Notes reviewed:    Hospital Discharge Summary:    D to  Hosp-Admission  Discharged  2022 - 2022 (3 days)  [facility] Amos Stone DO  Last attending  Treatment team     Discharge Summary  Amos Ortega DO (Physician)  Hospitalist  Expand All Collapse All                                                                                    [facility]                                                                           HOSPITALIST  DISCHARGE SUMMARY     Patient Name: Kezia Garcia  : 1940  MRN: [id number]     Date of Admission: 2022  Date of Discharge: 2022  Primary Care Physician: Laurent Blanco MD            Consults      Date and Time Order Name Status Description     2022  8:19 AM Inpatient Hospitalist Consult                 Active and Resolved Hospital Problems:       Active Hospital Problems     Diagnosis POA    Sepsis (HCC) [A41.9] Yes       Resolved Hospital Problems   No resolved problems to display.         Hospital Course      Hospital Course:  Kezia Garcia is a 82 y.o. female who presented from nursing home with chief complaint of falling out of bed at nursing home and altered mental status.  On his presentation patient had imaging that showed no acute intracranial abnormality.  She did have a left frontal scalp contusion.  She was started on empiric antibiotic therapy for urinary tract infection with Rocephin and cultures were obtained.  Patient's course was complicated by her dementia with behavioral disturbances and she required restraints as she was trying to get up out of bed frequently.  Ultimately restraints were discontinued and patient had a safety sitter at bedside.  Patient was doing well prior to discharge and her mood was appropriate.  She was eating and drinking without difficulty.  Patient had urine culture that ultimately showed no growth, however given her urinalysis and improvement in symptoms with antibiotic she will continue a total of 7 days therapy at discharge.   "Patient will be discharged back to the nursing home as before.  She will follow-up with her PCP in 1 week.  She will continue her other home medications as before.     Discharge problem list:  Acute Escherichia coli cystitis  Status post fall  Sepsis secondary to acute cystitis with findings of hypothermia and leukocytosis  COPD, not exacerbated  Advanced dementia  Seizure disorder     DISCHARGE Follow Up Recommendations for labs and diagnostics: PCP 1 week.                 The following orders were placed and all results were independently analyzed by me:  Orders Placed This Encounter   Procedures    Blood Culture - Blood,    Urine Culture - Urine,    XR Chest 1 View    [facility]y Draw    BNP    CBC Auto Differential    Urinalysis With Culture If Indicated - Urine, Catheter    Urinalysis, Microscopic Only - Urine, Clean Catch    NPO Diet NPO Type: Strict NPO    Undress & Gown    Continuous Pulse Oximetry    Vital Signs    Insert Indwelling Urinary Catheter    Assess Need for Indwelling Urinary Catheter - Follow Removal Protocol    Urinary Catheter Care    Oxygen Therapy- Nasal Cannula; Titrate 1-6 LPM Per SpO2; 90 - 95%    ECG 12 Lead ED Triage Standing Order; SOA    Insert Peripheral IV    CBC & Differential    Lavender Top    Gold Top - SST    Light Blue Top       Medications Given in the Emergency Department:  Medications   sodium chloride 0.9 % flush 10 mL (has no administration in time range)        ED Course:    ED Course as of 12/31/23 2135   Sun Dec 31, 2023   1944 I have personally interpreted the EKG today and it shows no evidence of any acute ischemia or heart arrhythmia.  Sinus tachycardia [RP]   2038 Multiple nurses confirmed that the patient was inappropriately sent here to the emergency department as family has a \"do not transport to hospital\" order.  I am told that we are not perform any further treatments for this patient based on family request and that this has been confirmed via telephone " conversation with family [RP]      ED Course User Index  [RP] Zane Wolfe MD       Labs:    Lab Results (last 24 hours)       Procedure Component Value Units Date/Time    CBC & Differential [[id number]]  (Abnormal) Collected: 12/31/23 1904    Specimen: Blood Updated: 12/31/23 1918    Narrative:      The following orders were created for panel order CBC & Differential.  Procedure                               Abnormality         Status                     ---------                               -----------         ------                     CBC Auto Differential[[id number]]        Abnormal            Final result                 Please view results for these tests on the individual orders.    BNP [[id number]]  (Normal) Collected: 12/31/23 1904    Specimen: Blood Updated: 12/31/23 1939     proBNP 675.1 pg/mL     Narrative:      This assay is used as an aid in the diagnosis of individuals suspected of having heart failure. It can be used as an aid in the diagnosis of acute decompensated heart failure (ADHF) in patients presenting with signs and symptoms of ADHF to the emergency department (ED). In addition, NT-proBNP of <300 pg/mL indicates ADHF is not likely.    Age Range Result Interpretation  NT-proBNP Concentration (pg/mL:      <50             Positive            >450                   Gray                 300-450                    Negative             <300    50-75           Positive            >900                  Gray                300-900                  Negative            <300      >75             Positive            >1800                  Gray                300-1800                  Negative            <300    CBC Auto Differential [[id number]]  (Abnormal) Collected: 12/31/23 1904    Specimen: Blood Updated: 12/31/23 1918     WBC 7.25 10*3/mm3      RBC 4.39 10*6/mm3      Hemoglobin 13.4 g/dL      Hematocrit 43.3 %      MCV 98.6 fL      MCH 30.5 pg      MCHC 30.9 g/dL      RDW 15.9 %      RDW-SD 59.0  fl      MPV 13.0 fL      Platelets 153 10*3/mm3      Neutrophil % 61.2 %      Lymphocyte % 29.4 %      Monocyte % 7.9 %      Eosinophil % 0.7 %      Basophil % 0.4 %      Immature Grans % 0.4 %      Neutrophils, Absolute 4.44 10*3/mm3      Lymphocytes, Absolute 2.13 10*3/mm3      Monocytes, Absolute 0.57 10*3/mm3      Eosinophils, Absolute 0.05 10*3/mm3      Basophils, Absolute 0.03 10*3/mm3      Immature Grans, Absolute 0.03 10*3/mm3      nRBC 0.0 /100 WBC     Blood Culture - Blood, Arm, Right [[id number]] Collected: 12/31/23 1915    Specimen: Blood from Arm, Right Updated: 12/31/23 1945    Urinalysis With Culture If Indicated - Urine, Catheter [[id number]]  (Abnormal) Collected: 12/31/23 1957    Specimen: Urine, Catheter Updated: 12/31/23 2014     Color, UA Dark Yellow     Appearance, UA Turbid     pH, UA <=5.0     Specific Gravity, UA 1.028     Glucose, UA Negative     Ketones, UA Trace     Bilirubin, UA Small (1+)     Blood, UA Negative     Protein, UA 30 mg/dL (1+)     Leuk Esterase, UA Large (3+)     Nitrite, UA Positive     Urobilinogen, UA 1.0 E.U./dL    Narrative:      In absence of clinical symptoms, the presence of pyuria, bacteria, and/or nitrites on the urinalysis result does not correlate with infection.    Urinalysis, Microscopic Only - Urine, Catheter [[id number]]  (Abnormal) Collected: 12/31/23 1957    Specimen: Urine, Catheter Updated: 12/31/23 2029     RBC, UA 3-5 /HPF      WBC, UA Too Numerous to Count /HPF      Bacteria, UA 4+ /HPF      Squamous Epithelial Cells, UA 13-20 /HPF      Transitional Epithelial Cells, UA 13-20 /HPF      Yeast, UA Moderate/2+ Yeast /HPF      Hyaline Casts, UA None Seen /LPF      Methodology Manual Light Microscopy    Urine Culture - Urine, Urine, Catheter [[id number]] Collected: 12/31/23 1957    Specimen: Urine, Catheter Updated: 12/31/23 2029             Imaging:    XR Chest 1 View    Result Date: 12/31/2023  PROCEDURE: XR CHEST 1 VW  COMPARISONS: 10/26/2023; 3/24/2023.   INDICATIONS: ALTERED MENTAL STATUS (AMS); SHORTNESS OF BREATH.  FINDINGS:  A single AP (or PA) upright portable chest radiograph was performed.  No cardiac enlargement is seen.  No acute infiltrate is appreciated.  No pleural effusion or pneumothorax is identified.  External artifacts obscure detail.  The thoracic aorta is atherosclerotic.  An implantable loop recorder (ILR) is projected over the left lateral costophrenic sulcus, seen previously.  The distal limb of a ventriculoperitoneal () shunt is projected over the right base of neck, right thorax, and right upper quadrant of the abdomen.  Degenerative changes involve the bilateral shoulders and the imaged spine.  There is a suspected chronic T12 vertebral compression fracture.  There has been interval improvement in lung expansion since 3/24/2023.  Otherwise, no significant interval change is seen since the prior study (or studies).        No acute infiltrate is appreciated.     Please note that portions of this note were completed with a voice recognition program.  CATHY THAPA JR, MD       Electronically Signed and Approved By: CATHY THAPA JR, MD on 12/31/2023 at 20:30                 Differential Diagnosis and Discussion:    Altered Mental Status: Based on the patient's signs and symptoms, differential diagnosis includes but is not limited to meningitis, stroke, sepsis, subarachnoid hemorrhage, intracranial bleeding, encephalitis, and metabolic encephalopathy.    All labs were reviewed and interpreted by me.  All X-rays impressions were independently interpreted by me.  EKG was interpreted by me.    MDM               Patient Care Considerations:    If patient were not DNR/comfort care only we would have certainly considered full lab workup along with sepsis treatment and admission to the hospital.      Consultants/Shared Management Plan:    None    Social Determinants of Health:    Patient is a nursing home/assisted living resident and has reliable  access to care.      Disposition and Care Coordination:    Discharge: The patient certainly met criteria for admission but this is not the family's wish at this time as she is comfort care only.          Final diagnoses:   Acute UTI   End of life care        ED Disposition       ED Disposition   Discharge    Condition   Stable    Comment   --               This medical record created using voice recognition software.             Zane Wolfe MD  12/31/23 2135       Zane Wolfe MD  12/31/23 2135

## 2024-01-01 NOTE — ED NOTES
"This RN and RN Beverly PAL spoke with the patient daughter Talita on the phone. The daughter stated that the patient is on comfort care at the nursing home. Daughter wants no more care to be completed or performed at this hospital. Daughter requested that the patient be sent back to [facility] nursing and rehab as soon as possible.     This RN also spoke with staff at [facility] nursing and rehab were they stated the patient was sent to the hospital on accident. The patient is listed as a \"do not send to hospital\" in their system.     Dr. Wolfe was made aware of familys decision on patient care   "

## 2024-01-02 LAB — BACTERIA SPEC AEROBE CULT: NORMAL

## 2024-01-03 LAB
QT INTERVAL: 306 MS
QTC INTERVAL: 428 MS

## 2024-01-05 LAB — BACTERIA SPEC AEROBE CULT: NORMAL

## 2024-02-04 NOTE — PROGRESS NOTES
"Pharmacy to Dose Vancomycin Day: 2  DURATION OF THERAPY: 5-7-22    INDICATION: SEPSIS  GOAL AUC: 400-600 MG/L.HR    HT: 157.5 cm (62\")       04/30/22 2302      Weight: 74.4 kg (164 lb 0.4 oz)         Estimated Creatinine Clearance: 39.1 mL/min (A) (by C-G formula based on SCr of 1.1 mg/dL (H)).  Results from last 7 days   Lab Units 04/30/22  1632   BUN mg/dL 30*   CREATININE mg/dL 1.10*     HD/PD/CRRT?: NO  CONTRAST ADMINISTERED? NO  I/O last 3 completed shifts:  In: 100 [IV Piggyback:100]  Out: -     Lab Results   Component Value Date    WBC 18.19 (H) 04/30/2022      Temperature    04/30/22 2302 05/01/22 0234 05/01/22 0714   Temp: 98.4 °F (36.9 °C) 98.9 °F (37.2 °C) 98 °F (36.7 °C)         Microbiology Results (last 10 days)       Procedure Component Value - Date/Time    Influenza Antigen, Rapid - Swab, Nasopharynx [[id number]]  (Normal) Collected: 04/30/22 1447    Lab Status: Final result Specimen: Swab from Nasopharynx Updated: 04/30/22 1546     Influenza A Ag, EIA Negative     Influenza B Ag, EIA Negative    COVID-19,APTIMA PANTHER(USHA),[facility], NP/OP SWAB IN UTM/VTM/[facility] TRANSPORT MEDIA,24 HR TAT - Swab, Nasopharynx [[id number]]  (Normal) Collected: 04/30/22 1447    Lab Status: Final result Specimen: Swab from Nasopharynx Updated: 04/30/22 2152     COVID19 Not Detected    Narrative:      Fact sheet for providers: https://www.fda.gov/media/595637/download     Fact sheet for patients: https://www.fda.gov/media/065412/download    Test performed by RT PCR.           04/30/22 1506  XR Chest    No active cardiopulmonary disease is seen    Other Antimicrobial Therapy: Merrem    ASSESSMENT / PLAN:  Current regimen: vancomycin 1250 mg q24hr. Per [facility], this regimen should provide:    AUC24,ss: 555 mg/L.hr  PAUC*: 83 %  Ctrough,ss: 17.1 mg/L  Pconc*: 36 %  Tox.: 13 %    Will check a trough level tomorrow  Labs ordered: BMP ordered for a.m.  " No